# Patient Record
Sex: MALE | Race: BLACK OR AFRICAN AMERICAN | Employment: FULL TIME | URBAN - METROPOLITAN AREA
[De-identification: names, ages, dates, MRNs, and addresses within clinical notes are randomized per-mention and may not be internally consistent; named-entity substitution may affect disease eponyms.]

---

## 2017-07-31 ENCOUNTER — HOSPITAL ENCOUNTER (EMERGENCY)
Age: 57
Discharge: HOME | End: 2017-07-31
Attending: EMERGENCY MEDICINE | Admitting: EMERGENCY MEDICINE
Payer: OTHER GOVERNMENT

## 2017-07-31 VITALS
RESPIRATION RATE: 20 BRPM | HEART RATE: 51 BPM | WEIGHT: 298 LBS | BODY MASS INDEX: 39.49 KG/M2 | OXYGEN SATURATION: 93 % | DIASTOLIC BLOOD PRESSURE: 79 MMHG | SYSTOLIC BLOOD PRESSURE: 112 MMHG | TEMPERATURE: 98.3 F | HEIGHT: 73 IN

## 2017-07-31 DIAGNOSIS — M10.9 ACUTE GOUTY ARTHRITIS: Primary | ICD-10-CM

## 2017-07-31 LAB
ALBUMIN SERPL BCP-MCNC: 3.8 G/DL (ref 3.5–5)
ALBUMIN/GLOB SERPL: 1 {RATIO} (ref 1.1–2.2)
ALP SERPL-CCNC: 53 U/L (ref 45–117)
ALT SERPL-CCNC: 32 U/L (ref 12–78)
ANION GAP BLD CALC-SCNC: 7 MMOL/L (ref 5–15)
AST SERPL W P-5'-P-CCNC: 14 U/L (ref 15–37)
ATRIAL RATE: 58 BPM
BASOPHILS # BLD AUTO: 0 K/UL (ref 0–0.1)
BASOPHILS # BLD: 0 % (ref 0–1)
BILIRUB SERPL-MCNC: 1.5 MG/DL (ref 0.2–1)
BUN SERPL-MCNC: 18 MG/DL (ref 6–20)
BUN/CREAT SERPL: 14 (ref 12–20)
CALCIUM SERPL-MCNC: 9.2 MG/DL (ref 8.5–10.1)
CALCULATED P AXIS, ECG09: -2 DEGREES
CALCULATED R AXIS, ECG10: -20 DEGREES
CALCULATED T AXIS, ECG11: -49 DEGREES
CHLORIDE SERPL-SCNC: 104 MMOL/L (ref 97–108)
CK MB CFR SERPL CALC: 1 % (ref 0–2.5)
CK MB SERPL-MCNC: 5.8 NG/ML (ref 5–25)
CK SERPL-CCNC: 560 U/L (ref 39–308)
CO2 SERPL-SCNC: 26 MMOL/L (ref 21–32)
CREAT SERPL-MCNC: 1.3 MG/DL (ref 0.7–1.3)
DIAGNOSIS, 93000: NORMAL
EOSINOPHIL # BLD: 0.1 K/UL (ref 0–0.4)
EOSINOPHIL NFR BLD: 1 % (ref 0–7)
ERYTHROCYTE [DISTWIDTH] IN BLOOD BY AUTOMATED COUNT: 16.6 % (ref 11.5–14.5)
ERYTHROCYTE [SEDIMENTATION RATE] IN BLOOD: 2 MM/HR (ref 0–20)
GLOBULIN SER CALC-MCNC: 3.9 G/DL (ref 2–4)
GLUCOSE SERPL-MCNC: 115 MG/DL (ref 65–100)
HCT VFR BLD AUTO: 41.2 % (ref 36.6–50.3)
HGB BLD-MCNC: 13.1 G/DL (ref 12.1–17)
LYMPHOCYTES # BLD AUTO: 28 % (ref 12–49)
LYMPHOCYTES # BLD: 1.8 K/UL (ref 0.8–3.5)
MCH RBC QN AUTO: 23.2 PG (ref 26–34)
MCHC RBC AUTO-ENTMCNC: 31.8 G/DL (ref 30–36.5)
MCV RBC AUTO: 72.9 FL (ref 80–99)
MONOCYTES # BLD: 0.5 K/UL (ref 0–1)
MONOCYTES NFR BLD AUTO: 8 % (ref 5–13)
NEUTS SEG # BLD: 4.1 K/UL (ref 1.8–8)
NEUTS SEG NFR BLD AUTO: 63 % (ref 32–75)
P-R INTERVAL, ECG05: 182 MS
PLATELET # BLD AUTO: 273 K/UL (ref 150–400)
POTASSIUM SERPL-SCNC: 3.8 MMOL/L (ref 3.5–5.1)
PROT SERPL-MCNC: 7.7 G/DL (ref 6.4–8.2)
Q-T INTERVAL, ECG07: 426 MS
QRS DURATION, ECG06: 96 MS
QTC CALCULATION (BEZET), ECG08: 418 MS
RBC # BLD AUTO: 5.65 M/UL (ref 4.1–5.7)
SODIUM SERPL-SCNC: 137 MMOL/L (ref 136–145)
TROPONIN I SERPL-MCNC: <0.04 NG/ML
URATE SERPL-MCNC: 9.5 MG/DL (ref 3.5–7.2)
VENTRICULAR RATE, ECG03: 58 BPM
WBC # BLD AUTO: 6.5 K/UL (ref 4.1–11.1)

## 2017-07-31 PROCEDURE — 84550 ASSAY OF BLOOD/URIC ACID: CPT | Performed by: EMERGENCY MEDICINE

## 2017-07-31 PROCEDURE — 84484 ASSAY OF TROPONIN QUANT: CPT | Performed by: EMERGENCY MEDICINE

## 2017-07-31 PROCEDURE — L3809 WHFO W/O JOINTS PRE OTS: HCPCS

## 2017-07-31 PROCEDURE — 93005 ELECTROCARDIOGRAM TRACING: CPT

## 2017-07-31 PROCEDURE — 74011250637 HC RX REV CODE- 250/637: Performed by: EMERGENCY MEDICINE

## 2017-07-31 PROCEDURE — 99285 EMERGENCY DEPT VISIT HI MDM: CPT

## 2017-07-31 PROCEDURE — 80053 COMPREHEN METABOLIC PANEL: CPT | Performed by: EMERGENCY MEDICINE

## 2017-07-31 PROCEDURE — 85652 RBC SED RATE AUTOMATED: CPT | Performed by: EMERGENCY MEDICINE

## 2017-07-31 PROCEDURE — 96374 THER/PROPH/DIAG INJ IV PUSH: CPT

## 2017-07-31 PROCEDURE — 82553 CREATINE MB FRACTION: CPT | Performed by: EMERGENCY MEDICINE

## 2017-07-31 PROCEDURE — 82550 ASSAY OF CK (CPK): CPT | Performed by: EMERGENCY MEDICINE

## 2017-07-31 PROCEDURE — 85025 COMPLETE CBC W/AUTO DIFF WBC: CPT | Performed by: EMERGENCY MEDICINE

## 2017-07-31 PROCEDURE — 74011250636 HC RX REV CODE- 250/636: Performed by: EMERGENCY MEDICINE

## 2017-07-31 PROCEDURE — 36415 COLL VENOUS BLD VENIPUNCTURE: CPT | Performed by: EMERGENCY MEDICINE

## 2017-07-31 RX ORDER — NAPROXEN 500 MG/1
500 TABLET ORAL 2 TIMES DAILY WITH MEALS
Qty: 20 TAB | Refills: 0 | Status: SHIPPED | OUTPATIENT
Start: 2017-07-31 | End: 2017-08-10

## 2017-07-31 RX ORDER — KETOROLAC TROMETHAMINE 30 MG/ML
15 INJECTION, SOLUTION INTRAMUSCULAR; INTRAVENOUS
Status: COMPLETED | OUTPATIENT
Start: 2017-07-31 | End: 2017-07-31

## 2017-07-31 RX ORDER — OXYCODONE AND ACETAMINOPHEN 5; 325 MG/1; MG/1
1 TABLET ORAL
Status: COMPLETED | OUTPATIENT
Start: 2017-07-31 | End: 2017-07-31

## 2017-07-31 RX ORDER — OXYCODONE AND ACETAMINOPHEN 5; 325 MG/1; MG/1
1 TABLET ORAL
Qty: 10 TAB | Refills: 0 | Status: SHIPPED | OUTPATIENT
Start: 2017-07-31 | End: 2017-10-18

## 2017-07-31 RX ADMIN — KETOROLAC TROMETHAMINE 15 MG: 30 INJECTION, SOLUTION INTRAMUSCULAR at 10:32

## 2017-07-31 RX ADMIN — OXYCODONE HYDROCHLORIDE AND ACETAMINOPHEN 1 TABLET: 5; 325 TABLET ORAL at 09:53

## 2017-07-31 NOTE — DISCHARGE INSTRUCTIONS

## 2017-07-31 NOTE — LETTER
[address] 
[address] [facility] [id number] 
[phone] Work/School Note Date: 7/31/2017 To Whom It May concern: 
 
Rc Siddiqui. was seen and treated today in the emergency room by the following provider(s): 
Attending Provider: Katty Huerta MD.   
 
Rc Siddiqui. return to work 8/3/17 Sincerely, Katty Huerta MD

## 2017-07-31 NOTE — ED PROVIDER NOTES
HPI Comments: 62 y.o. male with past medical history significant for HTN, cervical disc herniation, and stenosis of spine who presents from home via private vehicle with chief complaint of let wrist pain. Pt reports that he has been having pain and \"cramping\" in both wrists for the last week, but currently states that he only has pain in his left wrist which became more severe last night. Pain radiates up left arm into biceps. Pt has had gout in his knee in the past and reports compliance with diet reccomendations. 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 Pt denies any other joint pain - including hip, knee, or ankle - and also denies CP, SOB, fever, and chills. There are no other acute medical concerns at this time. Social hx: Never smoker. No alcohol use. PCP: Lennox Dies, MD    Note written by Britt Hannon, as dictated by Silvina Jarvis MD 9:41 AM      The history is provided by the patient. No  was used. Past Medical History:   Diagnosis Date    Cervical disc herniation 2014    C6-7    Hypertension     Stenosis of cervical spine        Past Surgical History:   Procedure Laterality Date    HX ORTHOPAEDIC      C3-4 surgery         History reviewed. No pertinent family history. Social History     Social History    Marital status:      Spouse name: N/A    Number of children: N/A    Years of education: N/A     Occupational History    Not on file. Social History Main Topics    Smoking status: Never Smoker    Smokeless tobacco: Never Used    Alcohol use No    Drug use: Not on file    Sexual activity: Not on file     Other Topics Concern    Not on file     Social History Narrative         ALLERGIES: Review of patient's allergies indicates no known allergies. Review of Systems   Constitutional: Negative for activity change, appetite change and fatigue.    HENT: Negative for ear pain, facial swelling, sore throat and trouble swallowing. Eyes: Negative for pain, discharge and visual disturbance. Respiratory: Negative for chest tightness, shortness of breath and wheezing. Cardiovascular: Negative for chest pain and palpitations. Gastrointestinal: Negative for abdominal pain, blood in stool, nausea and vomiting. Genitourinary: Negative for difficulty urinating, flank pain and hematuria. Musculoskeletal: Negative for arthralgias, joint swelling, myalgias and neck pain. +Left wrist pain    Skin: Negative for color change and rash. Neurological: Negative for dizziness, weakness, numbness and headaches. Hematological: Negative for adenopathy. Does not bruise/bleed easily. Psychiatric/Behavioral: Negative for behavioral problems, confusion and sleep disturbance. All other systems reviewed and are negative. Vitals:    07/31/17 0848   BP: 140/84   Pulse: 66   Resp: 16   Temp: 98.1 °F (36.7 °C)   SpO2: 96%   Weight: 135.2 kg (298 lb)   Height: 6' 1\" (1.854 m)            Physical Exam   Constitutional: He is oriented to person, place, and time. He appears well-developed and well-nourished. No distress. HENT:   Head: Normocephalic and atraumatic. Nose: Nose normal.   Mouth/Throat: Oropharynx is clear and moist.   Eyes: Conjunctivae and EOM are normal. Pupils are equal, round, and reactive to light. No scleral icterus. Neck: Normal range of motion. Neck supple. No JVD present. No tracheal deviation present. No thyromegaly present. No carotid bruits noted. Cardiovascular: Normal rate, regular rhythm, normal heart sounds and intact distal pulses. Exam reveals no gallop and no friction rub. No murmur heard. Pulmonary/Chest: Effort normal and breath sounds normal. No respiratory distress. He has no wheezes. He has no rales. He exhibits no tenderness. Abdominal: Soft. Bowel sounds are normal. He exhibits no distension and no mass. There is no tenderness.  There is no rebound and no guarding. Musculoskeletal: Normal range of motion. Tender over the left first metacarpal and extending down his left forearm. Tenderness and swelling over dorsum of left wrist. No erythema or warmth   Lymphadenopathy:     He has no cervical adenopathy. No significant palpable adenopathy in arm. No axillary adenopathy. Neurological: He is alert and oriented to person, place, and time. He has normal reflexes. No cranial nerve deficit. Coordination normal.   Skin: Skin is warm and dry. No rash noted. No erythema. Psychiatric: He has a normal mood and affect. His behavior is normal. Judgment and thought content normal.   Nursing note and vitals reviewed. Note written by Britt Galeano, as dictated by Rufino Valenzuela MD 9:43 AM      MDM  Number of Diagnoses or Management Options  Acute gouty arthritis: new and requires workup     Amount and/or Complexity of Data Reviewed  Clinical lab tests: ordered and reviewed  Decide to obtain previous medical records or to obtain history from someone other than the patient: yes  Review and summarize past medical records: yes    Risk of Complications, Morbidity, and/or Mortality  Presenting problems: moderate  Diagnostic procedures: moderate  Management options: moderate    Patient Progress  Patient progress: stable    ED Course       Procedures        ED EKG interpretation:  Rhythm: normal sinus rhythm; and regular . Rate (approx.): 58; Axis: left axis deviation; ST/T wave: T wave inversions at lateral leads V4 - V6. Note written by Britt Galeano, as dictated by Rufino Valenzuela MD 9:10 AM    PROGRESS NOTE:  10:43 AM  Uric Acid of 9.5. Assessment and plan: Will call gout. Will d/c home with Naprosyn and Percocet and place pt's wrist in a splint. Will instruct to not work for two days and to f/u with own PCP in 3 - 4 days if not improved.

## 2017-07-31 NOTE — ED TRIAGE NOTES
Pt presents with left wrist pain that began 1 week ago. Pt states the pain got worse last night and he was unable to sleep. Pt describes the pain as intermittent with increase in pain upon movement. Pain is radiating up arm into left bicep. Pt denies chest pain or SOB.

## 2017-10-18 ENCOUNTER — APPOINTMENT (OUTPATIENT)
Dept: GENERAL RADIOLOGY | Age: 57
End: 2017-10-18
Attending: EMERGENCY MEDICINE
Payer: OTHER GOVERNMENT

## 2017-10-18 ENCOUNTER — APPOINTMENT (OUTPATIENT)
Dept: CT IMAGING | Age: 57
End: 2017-10-18
Attending: EMERGENCY MEDICINE
Payer: OTHER GOVERNMENT

## 2017-10-18 ENCOUNTER — HOSPITAL ENCOUNTER (EMERGENCY)
Age: 57
Discharge: HOME | End: 2017-10-18
Attending: EMERGENCY MEDICINE
Payer: OTHER GOVERNMENT

## 2017-10-18 VITALS
HEIGHT: 73 IN | HEART RATE: 61 BPM | TEMPERATURE: 99.5 F | OXYGEN SATURATION: 90 % | SYSTOLIC BLOOD PRESSURE: 160 MMHG | DIASTOLIC BLOOD PRESSURE: 91 MMHG | RESPIRATION RATE: 20 BRPM

## 2017-10-18 DIAGNOSIS — M51.36 DEGENERATIVE DISC DISEASE, LUMBAR: ICD-10-CM

## 2017-10-18 DIAGNOSIS — E86.0 DEHYDRATION: ICD-10-CM

## 2017-10-18 DIAGNOSIS — M54.40 ACUTE MIDLINE LOW BACK PAIN WITH SCIATICA, SCIATICA LATERALITY UNSPECIFIED: Primary | ICD-10-CM

## 2017-10-18 LAB
ALBUMIN SERPL-MCNC: 3.4 G/DL (ref 3.5–5)
ALBUMIN/GLOB SERPL: 0.7 {RATIO} (ref 1.1–2.2)
ALP SERPL-CCNC: 62 U/L (ref 45–117)
ALT SERPL-CCNC: 33 U/L (ref 12–78)
AMORPH CRY URNS QL MICRO: ABNORMAL
ANION GAP SERPL CALC-SCNC: 11 MMOL/L (ref 5–15)
APPEARANCE UR: ABNORMAL
AST SERPL-CCNC: 18 U/L (ref 15–37)
BACTERIA URNS QL MICRO: NEGATIVE /HPF
BASOPHILS # BLD: 0 K/UL (ref 0–0.1)
BASOPHILS NFR BLD: 0 % (ref 0–1)
BILIRUB SERPL-MCNC: 1.9 MG/DL (ref 0.2–1)
BILIRUB UR QL CFM: NEGATIVE
BUN SERPL-MCNC: 37 MG/DL (ref 6–20)
BUN/CREAT SERPL: 19 (ref 12–20)
CALCIUM SERPL-MCNC: 9.7 MG/DL (ref 8.5–10.1)
CHLORIDE SERPL-SCNC: 100 MMOL/L (ref 97–108)
CO2 SERPL-SCNC: 22 MMOL/L (ref 21–32)
COLOR UR: ABNORMAL
CREAT SERPL-MCNC: 1.97 MG/DL (ref 0.7–1.3)
EOSINOPHIL # BLD: 0 K/UL (ref 0–0.4)
EOSINOPHIL NFR BLD: 0 % (ref 0–7)
EPITH CASTS URNS QL MICRO: ABNORMAL /LPF
ERYTHROCYTE [DISTWIDTH] IN BLOOD BY AUTOMATED COUNT: 15.4 % (ref 11.5–14.5)
GLOBULIN SER CALC-MCNC: 5 G/DL (ref 2–4)
GLUCOSE SERPL-MCNC: 112 MG/DL (ref 65–100)
GLUCOSE UR STRIP.AUTO-MCNC: NEGATIVE MG/DL
GRAN CASTS URNS QL MICRO: ABNORMAL /LPF
HCT VFR BLD AUTO: 44.6 % (ref 36.6–50.3)
HGB BLD-MCNC: 14.4 G/DL (ref 12.1–17)
HGB UR QL STRIP: NEGATIVE
HYALINE CASTS URNS QL MICRO: ABNORMAL /LPF (ref 0–5)
KETONES UR QL STRIP.AUTO: NEGATIVE MG/DL
LEUKOCYTE ESTERASE UR QL STRIP.AUTO: NEGATIVE
LYMPHOCYTES # BLD: 1.1 K/UL (ref 0.8–3.5)
LYMPHOCYTES NFR BLD: 10 % (ref 12–49)
MCH RBC QN AUTO: 23.6 PG (ref 26–34)
MCHC RBC AUTO-ENTMCNC: 32.3 G/DL (ref 30–36.5)
MCV RBC AUTO: 73.2 FL (ref 80–99)
MONOCYTES # BLD: 1.2 K/UL (ref 0–1)
MONOCYTES NFR BLD: 11 % (ref 5–13)
NEUTS SEG # BLD: 8.5 K/UL (ref 1.8–8)
NEUTS SEG NFR BLD: 79 % (ref 32–75)
NITRITE UR QL STRIP.AUTO: NEGATIVE
PH UR STRIP: 5 [PH] (ref 5–8)
PLATELET # BLD AUTO: 354 K/UL (ref 150–400)
POTASSIUM SERPL-SCNC: 3.9 MMOL/L (ref 3.5–5.1)
PROT SERPL-MCNC: 8.4 G/DL (ref 6.4–8.2)
PROT UR STRIP-MCNC: NEGATIVE MG/DL
RBC # BLD AUTO: 6.09 M/UL (ref 4.1–5.7)
RBC #/AREA URNS HPF: ABNORMAL /HPF (ref 0–5)
SODIUM SERPL-SCNC: 133 MMOL/L (ref 136–145)
SP GR UR REFRACTOMETRY: 1.02 (ref 1–1.03)
UR CULT HOLD, URHOLD: NORMAL
UROBILINOGEN UR QL STRIP.AUTO: 1 EU/DL (ref 0.2–1)
WBC # BLD AUTO: 10.7 K/UL (ref 4.1–11.1)
WBC CASTS URNS QL MICRO: ABNORMAL /LPF
WBC URNS QL MICRO: ABNORMAL /HPF (ref 0–4)
YEAST URNS QL MICRO: PRESENT

## 2017-10-18 PROCEDURE — 85025 COMPLETE CBC W/AUTO DIFF WBC: CPT | Performed by: EMERGENCY MEDICINE

## 2017-10-18 PROCEDURE — 80053 COMPREHEN METABOLIC PANEL: CPT | Performed by: EMERGENCY MEDICINE

## 2017-10-18 PROCEDURE — 74011636637 HC RX REV CODE- 636/637: Performed by: EMERGENCY MEDICINE

## 2017-10-18 PROCEDURE — 74011250637 HC RX REV CODE- 250/637: Performed by: EMERGENCY MEDICINE

## 2017-10-18 PROCEDURE — 96374 THER/PROPH/DIAG INJ IV PUSH: CPT

## 2017-10-18 PROCEDURE — 81001 URINALYSIS AUTO W/SCOPE: CPT | Performed by: EMERGENCY MEDICINE

## 2017-10-18 PROCEDURE — 74011250636 HC RX REV CODE- 250/636: Performed by: EMERGENCY MEDICINE

## 2017-10-18 PROCEDURE — 72131 CT LUMBAR SPINE W/O DYE: CPT

## 2017-10-18 PROCEDURE — 96375 TX/PRO/DX INJ NEW DRUG ADDON: CPT

## 2017-10-18 PROCEDURE — 74020 XR ABD FLAT/ ERECT: CPT

## 2017-10-18 PROCEDURE — 96376 TX/PRO/DX INJ SAME DRUG ADON: CPT

## 2017-10-18 PROCEDURE — 99284 EMERGENCY DEPT VISIT MOD MDM: CPT

## 2017-10-18 PROCEDURE — 96361 HYDRATE IV INFUSION ADD-ON: CPT

## 2017-10-18 RX ORDER — METHOCARBAMOL 750 MG/1
750 TABLET, FILM COATED ORAL 3 TIMES DAILY
Qty: 20 TAB | Refills: 0 | Status: SHIPPED | OUTPATIENT
Start: 2017-10-18 | End: 2018-04-26

## 2017-10-18 RX ORDER — HYDROMORPHONE HYDROCHLORIDE 1 MG/ML
1 INJECTION, SOLUTION INTRAMUSCULAR; INTRAVENOUS; SUBCUTANEOUS
Status: COMPLETED | OUTPATIENT
Start: 2017-10-18 | End: 2017-10-18

## 2017-10-18 RX ORDER — ADHESIVE BANDAGE
30 BANDAGE TOPICAL
Status: COMPLETED | OUTPATIENT
Start: 2017-10-18 | End: 2017-10-18

## 2017-10-18 RX ORDER — HYDROCODONE BITARTRATE AND ACETAMINOPHEN 7.5; 325 MG/1; MG/1
1 TABLET ORAL
Qty: 20 TAB | Refills: 0 | Status: SHIPPED | OUTPATIENT
Start: 2017-10-18 | End: 2018-04-26

## 2017-10-18 RX ORDER — METHOCARBAMOL 750 MG/1
750 TABLET, FILM COATED ORAL
Status: COMPLETED | OUTPATIENT
Start: 2017-10-18 | End: 2017-10-18

## 2017-10-18 RX ORDER — ONDANSETRON 2 MG/ML
4 INJECTION INTRAMUSCULAR; INTRAVENOUS
Status: COMPLETED | OUTPATIENT
Start: 2017-10-18 | End: 2017-10-18

## 2017-10-18 RX ORDER — PREDNISONE 20 MG/1
40 TABLET ORAL DAILY
Qty: 20 TAB | Refills: 0 | Status: SHIPPED | OUTPATIENT
Start: 2017-10-18 | End: 2017-10-22

## 2017-10-18 RX ORDER — KETOROLAC TROMETHAMINE 30 MG/ML
30 INJECTION, SOLUTION INTRAMUSCULAR; INTRAVENOUS
Status: COMPLETED | OUTPATIENT
Start: 2017-10-18 | End: 2017-10-18

## 2017-10-18 RX ORDER — PREDNISONE 20 MG/1
60 TABLET ORAL
Status: COMPLETED | OUTPATIENT
Start: 2017-10-18 | End: 2017-10-18

## 2017-10-18 RX ADMIN — METHOCARBAMOL 750 MG: 750 TABLET ORAL at 16:05

## 2017-10-18 RX ADMIN — KETOROLAC TROMETHAMINE 30 MG: 30 INJECTION, SOLUTION INTRAMUSCULAR at 14:28

## 2017-10-18 RX ADMIN — HYDROMORPHONE HYDROCHLORIDE 1 MG: 1 INJECTION, SOLUTION INTRAMUSCULAR; INTRAVENOUS; SUBCUTANEOUS at 17:47

## 2017-10-18 RX ADMIN — PREDNISONE 60 MG: 20 TABLET ORAL at 14:23

## 2017-10-18 RX ADMIN — SODIUM CHLORIDE 1000 ML: 900 INJECTION, SOLUTION INTRAVENOUS at 17:47

## 2017-10-18 RX ADMIN — HYDROMORPHONE HYDROCHLORIDE 1 MG: 1 INJECTION, SOLUTION INTRAMUSCULAR; INTRAVENOUS; SUBCUTANEOUS at 14:29

## 2017-10-18 RX ADMIN — ONDANSETRON 4 MG: 2 INJECTION INTRAMUSCULAR; INTRAVENOUS at 14:26

## 2017-10-18 RX ADMIN — MAGNESIUM HYDROXIDE 30 ML: 400 SUSPENSION ORAL at 16:06

## 2017-10-18 NOTE — ED TRIAGE NOTES
Lower back pain since Friday, denies injury, pt with hx of back problems, denies numbness or tingling, pain radiates to left leg from calf down, pt is also constipated, last bm Saturday

## 2017-10-18 NOTE — ED NOTES
Reviewed discharge paperwork with patient. Patient verbalized an understanding of discharge paperwork and has no further questions at the time of discharge.

## 2017-10-18 NOTE — ED PROVIDER NOTES
Patient is a 62 y.o. male presenting with back pain. Back Pain    Pertinent negatives include no headaches, no abdominal pain and no dysuria. Pt states that he suffers from chronic neck and back pain. For the past week the pain has increased on his lower right side of his back with radiculopathy to the right leg. Denies any blunt trauma, falls or new injury. Denies fever, rash, abdominal pain or urinary symptoms. Denies any saddle anesthesia or changes in bowel or bladder. He does report that he has \"issues with constipation\" but has not taken his metamucil because walking to the bathroom is painful; last BM about 3 days ago. Pain increases with walking, bending and position changes. Gait is slow but steady with assistance. He has not had any pain medications today prior to arrival. He was taking motrin and a \"muscle relaxant\" yesterday evening without relief. Past Medical History:   Diagnosis Date    Cervical disc herniation 2014    C6-7    Hypertension     Stenosis of cervical spine        Past Surgical History:   Procedure Laterality Date    HX ORTHOPAEDIC      C3-4 surgery         History reviewed. No pertinent family history. Social History     Social History    Marital status:      Spouse name: N/A    Number of children: N/A    Years of education: N/A     Occupational History    Not on file. Social History Main Topics    Smoking status: Never Smoker    Smokeless tobacco: Never Used    Alcohol use No    Drug use: Not on file    Sexual activity: Not on file     Other Topics Concern    Not on file     Social History Narrative         ALLERGIES: Review of patient's allergies indicates no known allergies. Review of Systems   Constitutional: Negative for activity change and appetite change. HENT: Negative for facial swelling, sore throat and trouble swallowing. Eyes: Negative. Respiratory: Negative for shortness of breath. Cardiovascular: Negative. Gastrointestinal: Negative for abdominal pain, diarrhea and vomiting. Genitourinary: Negative for dysuria. Musculoskeletal: Positive for back pain. Negative for neck pain. Skin: Negative for color change. Neurological: Negative for headaches. Psychiatric/Behavioral: Negative. Vitals:    10/18/17 1128   BP: 116/79   Pulse: 61   Resp: 20   Temp: 99.5 °F (37.5 °C)   SpO2: 96%   Height: 6' 1\" (1.854 m)            Physical Exam   Constitutional: He is oriented to person, place, and time. He appears well-nourished. Morbidly obese black male; ; retired ; non smoker   HENT:   Head: Normocephalic. Mouth/Throat: Oropharynx is clear and moist.   Eyes: Pupils are equal, round, and reactive to light. Neck: Normal range of motion. Neck supple. Cardiovascular: Normal rate and regular rhythm. Pulmonary/Chest: Effort normal and breath sounds normal.   Abdominal: Soft. Bowel sounds are normal. He exhibits no distension and no mass. There is no tenderness. There is no rebound and no guarding. Musculoskeletal: Normal range of motion. He exhibits tenderness. He exhibits no deformity. Reports severe right sided back pain; Skin integrity is intact. There is no obvious deformity, rash, bruising or erythema. Good neurovascular sensation. Neurological: He is alert and oriented to person, place, and time. Skin: Skin is warm and dry. Rash noted. Severe foot callouses with deep cracks   Nursing note and vitals reviewed. MDM  ED Course       Procedures      Pt has been re-examined and is feeling better. '  Discussed the need for a regular bowel regime, especially while taking pain medications. Recommend close ortho follow up.  5:51 PM  Patient's results and plan of care have been reviewed with him and his wife.   Patient and/or family have verbally conveyed their understanding and agreement of the patient's signs, symptoms, diagnosis, treatment and prognosis and additionally agree to follow up as recommended or return to the Emergency Room should his condition change prior to follow-up. Discharge instructions have also been provided to the patient with some educational information regarding his diagnosis as well a list of reasons why he would want to return to the ER prior to his follow-up appointment should his condition change. Elizabeth Siegel NP

## 2017-10-18 NOTE — LETTER
NOTIFICATION RETURN TO WORK / SCHOOL 
 
10/18/2017 5:53 PM 
 
Mr. Blas Haq. 
[address] [place] [id number] To Whom It May Concern: 
 
Blas Haq. is currently under the care of [facility]. He will return to work/school on: 10/23/17 If there are questions or concerns please have the patient contact our office. Sincerely, Lesley Severe, NP

## 2017-10-18 NOTE — DISCHARGE INSTRUCTIONS
Back Pain, Emergency or Urgent Symptoms: Care Instructions  Your Care Instructions  Many people have back pain at one time or another. In most cases, pain gets better with self-care that includes over-the-counter pain medicine, ice, heat, and exercises. Unless you have symptoms of a severe injury or heart attack, you may be able to give yourself a few days before you call a doctor. But some back problems are very serious. Do not ignore symptoms that need to be checked right away. Follow-up care is a key part of your treatment and safety. Be sure to make and go to all appointments, and call your doctor if you are having problems. It's also a good idea to know your test results and keep a list of the medicines you take. How can you care for yourself at home? · Sit or lie in positions that are most comfortable and that reduce your pain. Try one of these positions when you lie down:  ¨ Lie on your back with your knees bent and supported by large pillows. ¨ Lie on the floor with your legs on the seat of a sofa or chair. Duayne Lark on your side with your knees and hips bent and a pillow between your legs. ¨ Lie on your stomach if it does not make pain worse. · Do not sit up in bed, and avoid soft couches and twisted positions. Bed rest can help relieve pain at first, but it delays healing. Avoid bed rest after the first day. · Change positions every 30 minutes. If you must sit for long periods of time, take breaks from sitting. Get up and walk around, or lie flat. · Try using a heating pad on a low or medium setting, for 15 to 20 minutes every 2 or 3 hours. Try a warm shower in place of one session with the heating pad. You can also buy single-use heat wraps that last up to 8 hours. You can also try ice or cold packs on your back for 10 to 20 minutes at a time, several times a day. (Put a thin cloth between the ice pack and your skin.) This reduces pain and makes it easier to be active and exercise.   · Take pain medicines exactly as directed. ¨ If the doctor gave you a prescription medicine for pain, take it as prescribed. ¨ If you are not taking a prescription pain medicine, ask your doctor if you can take an over-the-counter medicine. When should you call for help? Call 911 anytime you think you may need emergency care. For example, call if:  · You are unable to move a leg at all. · You have back pain with severe belly pain. · You have symptoms of a heart attack. These may include:  ¨ Chest pain or pressure, or a strange feeling in the chest.  ¨ Sweating. ¨ Shortness of breath. ¨ Nausea or vomiting. ¨ Pain, pressure, or a strange feeling in the back, neck, jaw, or upper belly or in one or both shoulders or arms. ¨ Lightheadedness or sudden weakness. ¨ A fast or irregular heartbeat. After you call 911, the  may tell you to chew 1 adult-strength or 2 to 4 low-dose aspirin. Wait for an ambulance. Do not try to drive yourself. Call your doctor now or seek immediate medical care if:  · You have new or worse symptoms in your arms, legs, chest, belly, or buttocks. Symptoms may include:  ¨ Numbness or tingling. ¨ Weakness. ¨ Pain. · You lose bladder or bowel control. · You have back pain and:  ¨ You have injured your back while lifting or doing some other activity. Call if the pain is severe, has not gone away after 1 or 2 days, and you cannot do your normal daily activities. ¨ You have had a back injury before that needed treatment. ¨ Your pain has lasted longer than 4 weeks. ¨ You have had weight loss you cannot explain. ¨ You are age 48 or older. ¨ You have cancer now or have had it before. Watch closely for changes in your health, and be sure to contact your doctor if you are not getting better as expected. Where can you learn more? Go to http://noa-shawn.info/.   Enter F727 in the search box to learn more about \"Back Pain, Emergency or Urgent Symptoms: Care Instructions. \"  Current as of: March 20, 2017  Content Version: 11.3  © 7618-9248 [employer] Care instructions adapted under license by [employer] (which disclaims liability or warranty for this information). If you have questions about a medical condition or this instruction, always ask your healthcare professional. [address] any warranty or liability for your use of this information. Dehydration: Care Instructions  Your Care Instructions  Dehydration happens when your body loses too much fluid. This might happen when you do not drink enough water or you lose large amounts of fluids from your body because of diarrhea, vomiting, or sweating. Severe dehydration can be life-threatening. Water and minerals called electrolytes help put your body fluids back in balance. Learn the early signs of fluid loss, and drink more fluids to prevent dehydration. Follow-up care is a key part of your treatment and safety. Be sure to make and go to all appointments, and call your doctor if you are having problems. It's also a good idea to know your test results and keep a list of the medicines you take. How can you care for yourself at home? · To prevent dehydration, drink plenty of fluids, enough so that your urine is light yellow or clear like water. Choose water and other caffeine-free clear liquids until you feel better. If you have kidney, heart, or liver disease and have to limit fluids, talk with your doctor before you increase the amount of fluids you drink. · If you do not feel like eating or drinking, try taking small sips of water, sports drinks, or other rehydration drinks. · Get plenty of rest.  To prevent dehydration  · Add more fluids to your diet and daily routine, unless your doctor has told you not to. · During hot weather, drink more fluids. Drink even more fluids if you exercise a lot. Stay away from drinks with alcohol or caffeine.   · Watch for the symptoms of dehydration. These include:  ¨ A dry, sticky mouth. ¨ Dark yellow urine, and not much of it. ¨ Dry and sunken eyes. ¨ Feeling very tired. · Learn what problems can lead to dehydration. These include:  ¨ Diarrhea, fever, and vomiting. ¨ Any illness with a fever, such as pneumonia or the flu. ¨ Activities that cause heavy sweating, such as endurance races and heavy outdoor work in hot or humid weather. ¨ Alcohol or drug abuse or withdrawal.  ¨ Certain medicines, such as cold and allergy pills (antihistamines), diet pills (diuretics), and laxatives. ¨ Certain diseases, such as diabetes, cancer, and heart or kidney disease. When should you call for help? Call 911 anytime you think you may need emergency care. For example, call if:  · You passed out (lost consciousness). Call your doctor now or seek immediate medical care if:  · You are confused and cannot think clearly. · You are dizzy or lightheaded, or you feel like you may faint. · You have signs of needing more fluids. You have sunken eyes and a dry mouth, and you pass only a little dark urine. · You cannot keep fluids down. Watch closely for changes in your health, and be sure to contact your doctor if:  · You are not making tears. · Your skin is very dry and sags slowly back into place after you pinch it. · Your mouth and eyes are very dry. Where can you learn more? Go to http://noa-shawn.info/. Enter H011 in the search box to learn more about \"Dehydration: Care Instructions. \"  Current as of: March 20, 2017  Content Version: 11.3  © 2448-0951 [employer]. Care instructions adapted under license by [employer] (which disclaims liability or warranty for this information). If you have questions about a medical condition or this instruction, always ask your healthcare professional. [place] [address] any warranty or liability for your use of this information.          Learning About Degenerative Disc Disease  What is degenerative disc disease? Degenerative disc disease is not really a disease. It's a term used to describe the normal changes in your spinal discs as you age. Spinal discs are small, spongy discs that separate the bones (vertebrae) that make up the spine. The discs act as shock absorbers for the spine, so it can flex, bend, and twist.  Degenerative disc disease can take place in one or more places along the spine. It most often occurs in the discs in the lower back and the neck. What causes it? As we age, our spinal discs break down, or degenerate. This breakdown causes the symptoms of degenerative disc disease in some people. When the discs break down, they can lose fluid and dry out, and their outer layers can have tiny cracks or tears. This leads to less padding and less space between the bones in the spine. The body reacts to this by making bony growths on the spine called bone spurs. These spurs can press on the spinal nerve roots or spinal cord. This can cause pain and can affect how well the nerves work. What are the symptoms? Many people with degenerative disc disease have no pain. But others have severe pain or other symptoms that limit their activities. Some of the most common symptoms are:  · Pain in the back or neck. Where the pain occurs depends on which discs are affected. · Pain that gets worse when you move, such as bending over, reaching up, or twisting. · Pain that may occur in the rear end (buttocks), arm, or leg if a nerve is pinched. · Numbness or tingling in your arm or leg. How is it diagnosed? A doctor can often diagnose degenerative disc disease while doing a physical exam. If your exam shows no signs of a serious condition, imaging tests (such as an X-ray) aren't likely to help your doctor find the cause of your symptoms.   Sometimes degenerative disc disease is found when an X-ray is taken for another reason, such as an injury or other health problem. But even if the doctor finds degenerative disc disease, that doesn't always mean that you will have symptoms. How is it treated? There are several things you can do at home to manage pain from this problem. · To relieve pain, use ice or heat (whichever feels better) on the affected area. ¨ Put ice or a cold pack on the area for 10 to 20 minutes at a time. Put a thin cloth between the ice and your skin. ¨ Put a warm water bottle, a heating pad set on low, or a warm cloth on your back. Put a thin cloth between the heating pad and your skin. Do not go to sleep with a heating pad on your skin. · Ask your doctor if you can take acetaminophen (such as Tylenol) or nonsteroidal anti-inflammatory drugs, such as ibuprofen or naproxen. Your doctor can prescribe stronger medicines if needed. Be safe with medicines. Read and follow all instructions on the label. · Get some exercise every day. Exercise is one of the best ways to help your back feel better and stay better. It's best to start each exercise slowly. You may notice a little soreness, and that's okay. But if an exercise makes your pain worse, stop doing it. Here are things you can try:  ¨ Walking. It's the simplest and maybe the best activity for your back. It gets your blood moving and helps your muscles stay strong. ¨ Exercises that gently stretch and strengthen your stomach, back, and leg muscles. The stronger those muscles are, the better they're able to protect your back. If you have constant or severe pain in your back or spine, you may need other treatments, such as physical therapy. In some cases, your doctor may suggest surgery. Follow-up care is a key part of your treatment and safety. Be sure to make and go to all appointments, and call your doctor if you are having problems. It's also a good idea to know your test results and keep a list of the medicines you take. Where can you learn more?   Go to http://javier.info/. Enter V472 in the search box to learn more about \"Learning About Degenerative Disc Disease. \"  Current as of: March 21, 2017  Content Version: 11.3  © 2878-9607 [employer]. Care instructions adapted under license by [employer] (which disclaims liability or warranty for this information). If you have questions about a medical condition or this instruction, always ask your healthcare professional. [place] [address] any warranty or liability for your use of this information.

## 2018-04-26 ENCOUNTER — APPOINTMENT (OUTPATIENT)
Dept: GENERAL RADIOLOGY | Age: 58
End: 2018-04-26
Attending: PHYSICIAN ASSISTANT
Payer: OTHER GOVERNMENT

## 2018-04-26 ENCOUNTER — HOSPITAL ENCOUNTER (EMERGENCY)
Age: 58
Discharge: HOME | End: 2018-04-26
Attending: EMERGENCY MEDICINE
Payer: OTHER GOVERNMENT

## 2018-04-26 VITALS
SYSTOLIC BLOOD PRESSURE: 133 MMHG | BODY MASS INDEX: 38.43 KG/M2 | WEIGHT: 290 LBS | OXYGEN SATURATION: 97 % | HEIGHT: 73 IN | TEMPERATURE: 97.9 F | DIASTOLIC BLOOD PRESSURE: 83 MMHG | HEART RATE: 72 BPM | RESPIRATION RATE: 18 BRPM

## 2018-04-26 DIAGNOSIS — M54.9 RIGHT-SIDED BACK PAIN, UNSPECIFIED BACK LOCATION, UNSPECIFIED CHRONICITY: ICD-10-CM

## 2018-04-26 DIAGNOSIS — M62.830 SPASM OF THORACIC BACK MUSCLE: Primary | ICD-10-CM

## 2018-04-26 LAB
APPEARANCE UR: CLEAR
BACTERIA URNS QL MICRO: NEGATIVE /HPF
BILIRUB UR QL: NEGATIVE
COLOR UR: NORMAL
EPITH CASTS URNS QL MICRO: NORMAL /LPF
GLUCOSE UR STRIP.AUTO-MCNC: NEGATIVE MG/DL
HGB UR QL STRIP: NEGATIVE
HYALINE CASTS URNS QL MICRO: NORMAL /LPF (ref 0–5)
KETONES UR QL STRIP.AUTO: NEGATIVE MG/DL
LEUKOCYTE ESTERASE UR QL STRIP.AUTO: NEGATIVE
NITRITE UR QL STRIP.AUTO: NEGATIVE
PH UR STRIP: 6 [PH] (ref 5–8)
PROT UR STRIP-MCNC: NEGATIVE MG/DL
RBC #/AREA URNS HPF: NORMAL /HPF (ref 0–5)
SP GR UR REFRACTOMETRY: 1.02 (ref 1–1.03)
UR CULT HOLD, URHOLD: NORMAL
UROBILINOGEN UR QL STRIP.AUTO: 1 EU/DL (ref 0.2–1)
WBC URNS QL MICRO: NORMAL /HPF (ref 0–4)

## 2018-04-26 PROCEDURE — 81001 URINALYSIS AUTO W/SCOPE: CPT | Performed by: PHYSICIAN ASSISTANT

## 2018-04-26 PROCEDURE — 74011250636 HC RX REV CODE- 250/636: Performed by: PHYSICIAN ASSISTANT

## 2018-04-26 PROCEDURE — 99283 EMERGENCY DEPT VISIT LOW MDM: CPT

## 2018-04-26 PROCEDURE — 96372 THER/PROPH/DIAG INJ SC/IM: CPT

## 2018-04-26 PROCEDURE — 74011250637 HC RX REV CODE- 250/637: Performed by: PHYSICIAN ASSISTANT

## 2018-04-26 PROCEDURE — 72072 X-RAY EXAM THORAC SPINE 3VWS: CPT

## 2018-04-26 RX ORDER — DIAZEPAM 5 MG/1
5 TABLET ORAL
Qty: 15 TAB | Refills: 0 | Status: SHIPPED | OUTPATIENT
Start: 2018-04-26

## 2018-04-26 RX ORDER — OXYCODONE AND ACETAMINOPHEN 5; 325 MG/1; MG/1
1 TABLET ORAL
Qty: 20 TAB | Refills: 0 | Status: SHIPPED | OUTPATIENT
Start: 2018-04-26

## 2018-04-26 RX ORDER — OXYCODONE AND ACETAMINOPHEN 5; 325 MG/1; MG/1
2 TABLET ORAL
Status: COMPLETED | OUTPATIENT
Start: 2018-04-26 | End: 2018-04-26

## 2018-04-26 RX ORDER — DIAZEPAM 10 MG/2ML
5 INJECTION INTRAMUSCULAR
Status: COMPLETED | OUTPATIENT
Start: 2018-04-26 | End: 2018-04-26

## 2018-04-26 RX ADMIN — Medication 5 MG: at 20:46

## 2018-04-26 RX ADMIN — OXYCODONE HYDROCHLORIDE AND ACETAMINOPHEN 2 TABLET: 5; 325 TABLET ORAL at 20:45

## 2018-04-26 NOTE — LETTER
[address] 
[address] [facility] [address] 
[phone] Work/School Note Date: 4/26/2018 To Whom It May concern: 
 
Prince Shin. was seen and treated today in the emergency room by the following provider(s): 
Attending Provider: Chinyere Bonilla DO Physician Assistant: EARLINE Hernandez. Prince Shin may return to work on Monday. Sincerely, EARLINE Hernandez

## 2018-04-26 NOTE — ED TRIAGE NOTES
Triage:  Pt to ED due to concerns over reoccuring mid and lower back spasms. Pt states was seen and treated at [address] [place] [address] last week for the same. Pt states was given time off and meds for pain and spasms. Pt states he felt slightly better, he went to work today and noted a return of the aforementioned symptoms. Pt into triage with wheel chair due to spasms.

## 2018-04-27 NOTE — DISCHARGE INSTRUCTIONS
Back Spasm: Care Instructions  Your Care Instructions  A back spasm is sudden tightness and pain in your back muscles. It may happen from overuse or an injury. Things like sleeping in an awkward way, bending, lifting, standing, or sitting can sometimes cause a spasm. But the cause isn't always clear. Home treatment includes using heat or ice, taking over-the-counter (OTC) pain medicines, and avoiding activities that may cause back pain. For a back spasm that doesn't get better with home care, your doctor may prescribe medicine. Treatments such as massage or manipulation may also help ease a back spasm. Your doctor may also suggest exercise or physical therapy to help improve strength and flexibility in your back muscles. In most cases, getting back to your normal activities is good foryour back. Just make sure to avoid doing things that make your pain worse. Follow-up care is a key part of your treatment and safety. Be sure to make and go to all appointments, and call your doctor if you are having problems. It's also a good idea to know your test results and keep a list of the medicines you take. How can you care for yourself at home? ? Heat, ice, and medicines  ? · To relieve pain, use heat or ice (whichever feels better) on the affected area. ¨ Put a warm water bottle, a heating pad set on low, or a warm cloth on your back. Put a thin cloth between the heating pad and your skin. Do not go to sleep with a heating pad on your skin. ¨ Try ice or a cold pack on the area for 10 to 20 minutes at a time. Put a thin cloth between the ice and your skin. ? · Ask your doctor if you can take acetaminophen (such as Tylenol) or nonsteroidal anti-inflammatory drugs, such as ibuprofen or naproxen. Your doctor can prescribe stronger medicines if needed. Be safe with medicines. Read and follow all instructions on the label. ?Body positions and posture  ?  · Sit or lie in positions that are most comfortable for you and that reduce pain. Try one of these positions when you lie down:  ¨ Lie on your back with your knees bent and supported by large pillows. ¨ Lie on the floor with your legs on the seat of a sofa or chair. [place] on your side with your knees and hips bent and a pillow between your legs. ¨ Lie on your stomach if it does not make pain worse. ? · Do not sit up in bed. Avoid soft couches and twisted positions. ? · Avoid bed rest after the first day of back pain. Bed rest can help relieve pain at first, but it delays healing. Continued rest without activity is usually not good for your back. ? · If you must sit for long periods of time, take breaks from sitting. Change positions every 30 minutes. Get up and walk around, or lie in a comfortable position. Activity  ? · Take short walks several times a day. You can start with 5 to 10 minutes, 3 or 4 times a day, and work up to longer walks. Walk on level surfaces and avoid hills and stairs until your back starts to feel better. ? · After your back spasm starts to feel better, try to stretch your muscles every day, especially before and after exercise and at bedtime. Regular stretching can help relax your muscles. ? · To prevent future back pain, do exercises to stretch and strengthen your back and stomach. Learn to use good posture, safe lifting techniques, and other ways to move to help you avoid back pain. When should you call for help? Call 911 anytime you think you may need emergency care. For example, call if:  ? · You are unable to move an arm or a leg at all. ?Call your doctor now or seek immediate medical care if:  ? · You have new or worse symptoms in your legs, belly, or buttocks. Symptoms may include:  ¨ Numbness or tingling. ¨ Weakness. ¨ Pain. ? · You lose bladder or bowel control. ? Watch closely for changes in your health, and be sure to contact your doctor if:  ? · You do not get better as expected. Where can you learn more?   Go to http://noa-shawn.info/. Enter E232 in the search box to learn more about \"Back Spasm: Care Instructions. \"  Current as of: March 21, 2017  Content Version: 11.4  © 1910-5311 Healthwise, [employer]. Care instructions adapted under license by [employer] (which disclaims liability or warranty for this information). If you have questions about a medical condition or this instruction, always ask your healthcare professional. [place] [address] any warranty or liability for your use of this information.

## 2018-04-27 NOTE — ED PROVIDER NOTES
HPI Comments: 63 yo male with hx of HTN, cervical disc herniation and stenosis of cervical spine here for evaluation of right mid back \"spasms\". States he was experiencing pain last week and seen at [facility] and given Flexeril and Hydrocodone; states \"it does not work\". States he went back to work the past two days and pain has returned. Pain worse with movement and twisting. Has seen PCP in past and states he \"just finished therapy\"; has also been seen by [facility] in the past.   Denies loss of bowel or baldder. No numbness/tingling. Denies cough, CP or SOB. Denies fever, abd pain, urinary symptoms. Non smoker. Patient is a 62 y.o. male presenting with back pain. The history is provided by the patient. Back Pain    This is a recurrent problem. The current episode started more than 2 days ago. The pain is present in the right side. The quality of the pain is described as aching. The pain is at a severity of 8/10. The pain is moderate. The symptoms are aggravated by certain positions and twisting. Pertinent negatives include no chest pain, no fever, no bowel incontinence, no perianal numbness, no leg pain, no paresthesias, no paresis, no tingling and no weakness. He has tried analgesics and muscle relaxants for the symptoms. Past Medical History:   Diagnosis Date    Cervical disc herniation 2014    C6-7    Hypertension     Stenosis of cervical spine        Past Surgical History:   Procedure Laterality Date    HX ORTHOPAEDIC      C3-4 surgery         History reviewed. No pertinent family history. Social History     Social History    Marital status:      Spouse name: N/A    Number of children: N/A    Years of education: N/A     Occupational History    Not on file.      Social History Main Topics    Smoking status: Never Smoker    Smokeless tobacco: Never Used    Alcohol use No    Drug use: Not on file    Sexual activity: Not on file     Other Topics Concern    Not on file Social History Narrative         ALLERGIES: Review of patient's allergies indicates no known allergies. Review of Systems   Constitutional: Negative for activity change and fever. HENT: Negative for facial swelling. Eyes: Negative for discharge. Respiratory: Negative for cough. Cardiovascular: Negative for chest pain and leg swelling. Gastrointestinal: Negative for abdominal distention and bowel incontinence. Musculoskeletal: Positive for back pain and myalgias. Skin: Negative for color change. Neurological: Negative for tingling, seizures, syncope, weakness and paresthesias. Psychiatric/Behavioral: Negative for behavioral problems. Vitals:    04/26/18 1959   BP: 133/83   Pulse: 72   Resp: 18   Temp: 97.9 °F (36.6 °C)   SpO2: 97%   Weight: 131.5 kg (290 lb)   Height: 6' 1\" (1.854 m)            Physical Exam   Constitutional: He is oriented to person, place, and time. He appears well-developed and well-nourished. He appears distressed (moderate). HENT:   Head: Normocephalic and atraumatic. Right Ear: External ear normal.   Left Ear: External ear normal.   Nose: Nose normal.   Mouth/Throat: Oropharynx is clear and moist.   Eyes: Conjunctivae and EOM are normal. Pupils are equal, round, and reactive to light. Right eye exhibits no discharge. Left eye exhibits no discharge. Neck: Normal range of motion. Neck supple. Cardiovascular: Normal rate, regular rhythm, normal heart sounds and intact distal pulses. Pulmonary/Chest: Effort normal and breath sounds normal.   Abdominal: Soft. Bowel sounds are normal. He exhibits no distension. There is no tenderness. There is no rebound and no guarding. Musculoskeletal: Normal range of motion. He exhibits no edema. Cervical back: Normal.        Thoracic back: He exhibits tenderness. He exhibits no bony tenderness. Lumbar back: Normal.        Back:    Neurological: He is alert and oriented to person, place, and time.  No cranial nerve deficit. Coordination normal.   Skin: Skin is warm and dry. No rash noted. Psychiatric: He has a normal mood and affect. His behavior is normal. Judgment and thought content normal.   Nursing note and vitals reviewed. MDM  Number of Diagnoses or Management Options  Right-sided back pain, unspecified back location, unspecified chronicity:   Spasm of thoracic back muscle:      Amount and/or Complexity of Data Reviewed  Tests in the radiology section of CPT®: ordered and reviewed  Independent visualization of images, tracings, or specimens: yes          ED Course       Procedures    Patient has been reassessed. Feeling much better. Reviewed labs, medications and radiographics with patient. Ready to discharge home. Will have PCP and Ortho followup. Patient's results have been reviewed with them. Patient and/or family have verbally conveyed their understanding and agreement of the patient's signs, symptoms, diagnosis, treatment and prognosis and additionally agree to follow up as recommended or return to the Emergency Room should their condition change prior to follow-up. Discharge instructions have also been provided to the patient with some educational information regarding their diagnosis as well a list of reasons why they would want to return to the ER prior to their follow-up appointment should their condition change.   EARLINE Sheehan

## 2018-04-27 NOTE — ED NOTES
Pt discharged with all personal belonging, prescriptions, and discharge instructions. Pt states understanding and denies any further needs/ concerns at this time. Pt via [facility] from ED.

## 2023-06-11 PROBLEM — R06.09 DYSPNEA ON EXERTION: Status: ACTIVE | Noted: 2023-06-11

## 2023-06-13 PROBLEM — E11.65 TYPE 2 DIABETES MELLITUS WITH HYPERGLYCEMIA, WITH LONG-TERM CURRENT USE OF INSULIN (HCC): Status: ACTIVE | Noted: 2023-06-13

## 2023-06-13 PROBLEM — E11.9 NEW ONSET TYPE 2 DIABETES MELLITUS (HCC): Status: ACTIVE | Noted: 2023-06-13

## 2023-06-13 PROBLEM — I10 ESSENTIAL HYPERTENSION: Status: ACTIVE | Noted: 2023-06-13

## 2023-06-13 PROBLEM — R60.0 PERIPHERAL EDEMA: Status: ACTIVE | Noted: 2023-06-13

## 2023-06-13 PROBLEM — I48.91 ATRIAL FIBRILLATION (HCC): Status: ACTIVE | Noted: 2023-06-13

## 2023-06-13 PROBLEM — Z79.4 TYPE 2 DIABETES MELLITUS WITH HYPERGLYCEMIA, WITH LONG-TERM CURRENT USE OF INSULIN (HCC): Status: ACTIVE | Noted: 2023-06-13

## 2023-06-13 PROBLEM — R60.9 PERIPHERAL EDEMA: Status: ACTIVE | Noted: 2023-06-13

## 2023-06-13 PROBLEM — R06.2 WHEEZING: Status: ACTIVE | Noted: 2023-06-13

## 2023-07-07 ENCOUNTER — OFFICE VISIT (OUTPATIENT)
Age: 63
End: 2023-07-07
Payer: OTHER GOVERNMENT

## 2023-07-07 VITALS
BODY MASS INDEX: 42.66 KG/M2 | HEIGHT: 72 IN | OXYGEN SATURATION: 94 % | DIASTOLIC BLOOD PRESSURE: 60 MMHG | SYSTOLIC BLOOD PRESSURE: 100 MMHG | RESPIRATION RATE: 16 BRPM | HEART RATE: 51 BPM | WEIGHT: 315 LBS

## 2023-07-07 DIAGNOSIS — E66.01 CLASS 3 SEVERE OBESITY DUE TO EXCESS CALORIES WITH BODY MASS INDEX (BMI) OF 45.0 TO 49.9 IN ADULT, UNSPECIFIED WHETHER SERIOUS COMORBIDITY PRESENT (HCC): ICD-10-CM

## 2023-07-07 DIAGNOSIS — Z79.4 TYPE 2 DIABETES MELLITUS WITH HYPERGLYCEMIA, WITH LONG-TERM CURRENT USE OF INSULIN (HCC): ICD-10-CM

## 2023-07-07 DIAGNOSIS — I48.91 ATRIAL FIBRILLATION STATUS POST CARDIOVERSION (HCC): Primary | ICD-10-CM

## 2023-07-07 DIAGNOSIS — R60.9 PERIPHERAL EDEMA: ICD-10-CM

## 2023-07-07 DIAGNOSIS — I48.0 PAROXYSMAL ATRIAL FIBRILLATION (HCC): ICD-10-CM

## 2023-07-07 DIAGNOSIS — I10 ESSENTIAL HYPERTENSION: ICD-10-CM

## 2023-07-07 DIAGNOSIS — E11.65 TYPE 2 DIABETES MELLITUS WITH HYPERGLYCEMIA, WITH LONG-TERM CURRENT USE OF INSULIN (HCC): ICD-10-CM

## 2023-07-07 PROBLEM — E66.813 CLASS 3 SEVERE OBESITY DUE TO EXCESS CALORIES IN ADULT: Status: ACTIVE | Noted: 2023-07-07

## 2023-07-07 PROCEDURE — 99214 OFFICE O/P EST MOD 30 MIN: CPT | Performed by: INTERNAL MEDICINE

## 2023-07-07 PROCEDURE — 93005 ELECTROCARDIOGRAM TRACING: CPT | Performed by: INTERNAL MEDICINE

## 2023-07-07 PROCEDURE — 3074F SYST BP LT 130 MM HG: CPT | Performed by: INTERNAL MEDICINE

## 2023-07-07 PROCEDURE — 93010 ELECTROCARDIOGRAM REPORT: CPT | Performed by: INTERNAL MEDICINE

## 2023-07-07 PROCEDURE — 3078F DIAST BP <80 MM HG: CPT | Performed by: INTERNAL MEDICINE

## 2023-07-07 PROCEDURE — 3051F HG A1C>EQUAL 7.0%<8.0%: CPT | Performed by: INTERNAL MEDICINE

## 2023-07-07 ASSESSMENT — ENCOUNTER SYMPTOMS
WHEEZING: 0
CHEST TIGHTNESS: 0
SHORTNESS OF BREATH: 0

## 2023-07-07 NOTE — PROGRESS NOTES
4 month follow up with Dr. Desirae Abdullahi scheduled.
Effort: Pulmonary effort is normal.      Breath sounds: Normal breath sounds. Musculoskeletal:         General: No swelling. Normal range of motion. Cervical back: Normal range of motion and neck supple. Right lower leg: No edema. Left lower leg: No edema. Skin:     General: Skin is warm and dry. Capillary Refill: Capillary refill takes more than 3 seconds. Neurological:      General: No focal deficit present. Mental Status: He is alert and oriented to person, place, and time. Psychiatric:         Mood and Affect: Mood normal.        Lab Results   Component Value Date/Time    CHOL 187 06/12/2023 05:20 AM    HDL 49 06/12/2023 05:20 AM       Lab Results   Component Value Date/Time    WBC 5.7 06/14/2023 05:09 AM    HGB 15.3 06/14/2023 05:09 AM    HCT 51.8 06/14/2023 05:09 AM     06/14/2023 05:09 AM    MCV 80.4 06/14/2023 05:09 AM        Lab Results   Component Value Date/Time    CHOL 187 06/12/2023 05:20 AM    HDL 49 06/12/2023 05:20 AM        ASSESSMENT  Grace Reno was seen today for atrial fibrillation and congestive heart failure. Diagnoses and all orders for this visit:    Atrial fibrillation status post cardioversion [facility])  -     EKG 12 Lead    Essential hypertension    Peripheral edema    Paroxysmal atrial fibrillation (HCC)    Type 2 diabetes mellitus with hyperglycemia, with long-term current use of insulin (HCC)    Class 3 severe obesity due to excess calories with body mass index (BMI) of 45.0 to 49.9 in adult, unspecified whether serious comorbidity present ([address])           Return in about 4 months (around 11/7/2023).      Electronically signed by Sulema Farr MD on 7/7/2023 at 12:07 PM           [address] Dick Dempsey 101 200  [place], [address]  [phone] (Q)  [phone] (F)    [address] [facility]  [address] [facility]  [phone] (P)  [phone] (F)    ATTENTION:   This medical record was transcribed using an electronic medical

## 2023-07-27 ENCOUNTER — TELEPHONE (OUTPATIENT)
Age: 63
End: 2023-07-27

## 2023-07-27 DIAGNOSIS — I50.32 CHRONIC HEART FAILURE WITH PRESERVED EJECTION FRACTION (HCC): Primary | ICD-10-CM

## 2023-07-27 RX ORDER — EMPAGLIFLOZIN 10 MG/1
10 TABLET, FILM COATED ORAL DAILY
COMMUNITY
Start: 2023-07-12

## 2023-07-27 RX ORDER — BLOOD-GLUCOSE METER
1 KIT MISCELLANEOUS 2 TIMES DAILY
COMMUNITY
Start: 2023-07-12

## 2023-07-27 RX ORDER — ROSUVASTATIN CALCIUM 20 MG/1
20 TABLET, COATED ORAL DAILY
COMMUNITY

## 2023-07-27 RX ORDER — METOPROLOL SUCCINATE 50 MG/1
50 TABLET, EXTENDED RELEASE ORAL DAILY
Qty: 90 TABLET | Refills: 3 | Status: SHIPPED | OUTPATIENT
Start: 2023-07-27

## 2023-07-27 NOTE — TELEPHONE ENCOUNTER
Shortness of Breath  (Newest Message First)  Lexi Woody MD  You 39 minutes ago (1:10 PM)     DD  Called - has FU appt 8/2 - can you call patient and convey we spoke. Added crestor 20. Sent entresto step 1, change his BB toiprl 25.

## 2023-07-27 NOTE — TELEPHONE ENCOUNTER
called and stated she can put in the zipcode [phone] and it should work,please advise    [phone]

## 2023-07-27 NOTE — TELEPHONE ENCOUNTER
Dr. Sue Jaime is requesting a call back from the nurse to discusspts lasix medication dosage.      Dr. Sue Jaime # [phone]

## 2023-07-27 NOTE — TELEPHONE ENCOUNTER
Spoke with [facility]ist at [facility]. We will stop telmisartan. Stop Metoprolol tartrate. Start Entresto 24/26 1 tab po BID. Start Toprol XL 50mg po QD. Continue lasix 20mg PO QD. FU with me 8/2.     RX's sent over

## 2023-07-27 NOTE — TELEPHONE ENCOUNTER
Spoke to pt about call to Dr Richard Harrell. Added Crestor 20 mg to his list. He will keep 8/2/23 appt.

## 2023-07-27 NOTE — TELEPHONE ENCOUNTER
Pt c/o wheezing x 1 week and today noticed that he was short of breath walking to office for an appt with pharmacist at PCP for his diabetes. He states he has gained 3 pounds this week and has had diet indiscretions with sodium. He takes Lasix 20 mg daily. Pharmacist also wanted to know if Betsey Busing would be a good choice for him and wanted to know if Metoprolol tartrate should be changed to long acting.

## 2023-07-28 ENCOUNTER — TELEPHONE (OUTPATIENT)
Age: 63
End: 2023-07-28

## 2023-07-28 DIAGNOSIS — I50.32 CHRONIC HEART FAILURE WITH PRESERVED EJECTION FRACTION (HCC): ICD-10-CM

## 2023-07-28 NOTE — TELEPHONE ENCOUNTER
Pt is calling because he needs a refill on 2 medications. Pt needs Entresto 24/26 and Toprol XlL 50 mg. Pt gets his medicine from [facility]. Lisbeth Bennett was supposed to had call these in yesterday for the patient.     [address], [address] [place] [address] [phone] patient

## 2023-07-28 NOTE — TELEPHONE ENCOUNTER
Received call from patient's pharmacy - Rodrigue Vines (pharmacist), ID verified using two patient identifiers. Dr. Agus Vines is calling to see if the patient's entresto prescription can be sent to them. The refill was sent to patient's [address] by mistake. Refill sent and confirmation received by Dr. Agus Vines.     Requested Prescriptions     Signed Prescriptions Disp Refills    sacubitril-valsartan (ENTRESTO) 24-26 MG per tablet 180 tablet 3     Sig: Take 1 tablet by mouth 2 times daily     Authorizing Provider: Marysol Rojo     Ordering User: Eugene Blanton

## 2023-07-28 NOTE — TELEPHONE ENCOUNTER
Clint Qureshi is calling because they need the Entresto rx to be sent over to the pharmacy    [facility]  [address] [place], [place] [phone]

## 2023-08-02 ENCOUNTER — OFFICE VISIT (OUTPATIENT)
Age: 63
End: 2023-08-02
Payer: OTHER GOVERNMENT

## 2023-08-02 VITALS
DIASTOLIC BLOOD PRESSURE: 110 MMHG | HEIGHT: 72 IN | BODY MASS INDEX: 42.66 KG/M2 | WEIGHT: 315 LBS | HEART RATE: 111 BPM | OXYGEN SATURATION: 95 % | SYSTOLIC BLOOD PRESSURE: 138 MMHG

## 2023-08-02 DIAGNOSIS — Z79.4 TYPE 2 DIABETES MELLITUS WITH HYPERGLYCEMIA, WITH LONG-TERM CURRENT USE OF INSULIN (HCC): ICD-10-CM

## 2023-08-02 DIAGNOSIS — I10 ESSENTIAL HYPERTENSION: ICD-10-CM

## 2023-08-02 DIAGNOSIS — I50.32 CHRONIC HEART FAILURE WITH PRESERVED EJECTION FRACTION (HCC): Primary | ICD-10-CM

## 2023-08-02 DIAGNOSIS — I48.0 PAROXYSMAL ATRIAL FIBRILLATION (HCC): ICD-10-CM

## 2023-08-02 DIAGNOSIS — E11.65 TYPE 2 DIABETES MELLITUS WITH HYPERGLYCEMIA, WITH LONG-TERM CURRENT USE OF INSULIN (HCC): ICD-10-CM

## 2023-08-02 DIAGNOSIS — E66.01 CLASS 3 SEVERE OBESITY DUE TO EXCESS CALORIES WITH BODY MASS INDEX (BMI) OF 45.0 TO 49.9 IN ADULT, UNSPECIFIED WHETHER SERIOUS COMORBIDITY PRESENT (HCC): ICD-10-CM

## 2023-08-02 PROCEDURE — 99214 OFFICE O/P EST MOD 30 MIN: CPT | Performed by: INTERNAL MEDICINE

## 2023-08-02 PROCEDURE — 3051F HG A1C>EQUAL 7.0%<8.0%: CPT | Performed by: INTERNAL MEDICINE

## 2023-08-02 PROCEDURE — 93005 ELECTROCARDIOGRAM TRACING: CPT | Performed by: INTERNAL MEDICINE

## 2023-08-02 PROCEDURE — 3080F DIAST BP >= 90 MM HG: CPT | Performed by: INTERNAL MEDICINE

## 2023-08-02 PROCEDURE — 3075F SYST BP GE 130 - 139MM HG: CPT | Performed by: INTERNAL MEDICINE

## 2023-08-02 PROCEDURE — 93010 ELECTROCARDIOGRAM REPORT: CPT | Performed by: INTERNAL MEDICINE

## 2023-08-02 ASSESSMENT — ENCOUNTER SYMPTOMS
SHORTNESS OF BREATH: 0
CHEST TIGHTNESS: 0
WHEEZING: 0

## 2023-08-02 NOTE — PROGRESS NOTES
Estefanía Angela MD, MS, [facility]            HISTORY OF PRESENT ILLNESS:    Breanne Cleveland is a 61 y.o. male presents today for had concerns including Atrial Fibrillation and Hypertension. Recent admission [facility]. AFIB/HFpEF - plan for CASEY + DCCV 6/14/2023. Eliquis started. CCB changed to BB. BP control. IV lasix, transition to PO upon discharge. Outpatient TOMMY screening. DM2    PMH of HTN (telmisartan-HCTZ and diltiazem CD), morbid obesity, pre-diabetes, recent pneumonia, who presents with chief c/o of SOB and wheezing. Reports he has been SOB for past few months since he was diagnosed with pneumonia 3 months ago. Reports some BLE edema. No chest pain reported. He reports he had a sleep study over 5 years ago which did not demonstrate TOMMY but he has since gained weight. In ER, his CXR showed cardiomegaly but no acute intrathoracic disease. BNP was 424, Ddimer 0.88. He was also noted to be in afib, rate controlled on 12 lead EKG. He denies any prior history of afib. A1c=7.6. HDL 49, .8, triglyceride 106, creatinine 1.25. Hgb 13.8. Mg=2.2, K hemolyzed. Creatinine was 1.41 on admission now 1.25. In the interim, saw [facility] Pharmacist Dr. Mino Perez who recommended switching telmisartan to [facility], beta-blocker to long-acting Toprol, which we did. He was initiated on step 1 Entresto and Toprol XL 50. He was at the dentist today and his blood pressure was elevated. 138/110. He otherwise feels well. Weight seems stable. Discussed increasing Entresto to step 2, continue beta-blocker, Lasix. Discussed continuing course - weight loss, TOMMY eval.  Discussed ablation and watchman, possibly down the road.      SUMMARY:   Patient Active Problem List   Diagnosis    Dyspnea on exertion    Type 2 diabetes mellitus with hyperglycemia, with long-term current use of insulin (HCC)    Atrial fibrillation (HCC)    Essential hypertension    Peripheral edema    Wheezing    Class 3 severe

## 2023-08-08 ENCOUNTER — TELEPHONE (OUTPATIENT)
Age: 63
End: 2023-08-08

## 2023-08-08 NOTE — TELEPHONE ENCOUNTER
----- Message from [place] sent at 8/7/2023  3:22 PM EDT -----  Regarding: cardiac rehab  Thank you for the referral for cardiac rehab. Unfortunately Mr. Yael Overton does not qualify for CR with a HF diagnosis. His EF must be 35% or less to qualify and his latest is 45-50%. The other diagnosis listed do not concern CR, therefore will not qualify him either. Lyn Rock, can you please let your patient know that his insurance will not cover cardiac rehab for the reason listed above, as he is already an established patient in your clinic?      We truly appreciate the referral though,  Christiano Trotter

## 2023-08-08 NOTE — TELEPHONE ENCOUNTER
[facility] convey message below and tor return call with any questions or concerns.

## 2023-08-15 ASSESSMENT — SLEEP AND FATIGUE QUESTIONNAIRES
HOW LIKELY ARE YOU TO NOD OFF OR FALL ASLEEP WHILE SITTING AND READING: 0
DO YOU HAVE DIFFICULTY WATCHING A MOVIE OR VIDEO BECAUSE YOU BECOME SLEEPY OR TIRED: NO
DO YOU HAVE DIFFICULTY OPERATING A MOTOR VEHICLE FOR SHORT DISTANCES (LESS THAN 100 MILES) BECAUSE YOU BECOME SLEEPY: NO
HOW LIKELY ARE YOU TO NOD OFF OR FALL ASLEEP WHILE WATCHING TV: 0
DO YOU HAVE DIFFICULTY VISITING YOUR FAMILY OR FRIENDS IN THEIR HOME BECAUSE YOU BECOME SLEEPY OR TIRED: NO
DO YOU HAVE DIFFICULTY BEING AS ACTIVE AS YOU WANT TO BE IN THE EVENING BECAUSE YOU ARE SLEEPY OR TIRED: NO
HOW LIKELY ARE YOU TO NOD OFF OR FALL ASLEEP IN A CAR, WHILE STOPPED FOR A FEW MINUTES IN TRAFFIC: WOULD NEVER DOZE
AVERAGE NUMBER OF SLEEP HOURS: 8
HOW LIKELY ARE YOU TO NOD OFF OR FALL ASLEEP WHILE WATCHING TV: WOULD NEVER DOZE
DO YOU GET TOO LITTLE SLEEP AT NIGHT: NO
HOW LIKELY ARE YOU TO NOD OFF OR FALL ASLEEP WHILE SITTING INACTIVE IN A PUBLIC PLACE: 0
ARE YOU BOTHERED BY WAKING UP TOO EARLY AND NOT BEING ABLE TO GET BACK TO SLEEP: IS NOT
DO YOU WORK SHIFTS: NO
FOSQ SCORE: 20
HOW LIKELY ARE YOU TO NOD OFF OR FALL ASLEEP WHILE LYING DOWN TO REST IN THE AFTERNOON WHEN CIRCUMSTANCES PERMIT: SLIGHT CHANCE OF DOZING
NECK CIRCUMFERENCE (INCHES): 18.25
SELECT ANY OF THE FOLLOWING BEHAVIORS OBSERVED WHILE YOU ARE ASLEEP: NONE OF THE ABOVE
HOW LIKELY ARE YOU TO NOD OFF OR FALL ASLEEP WHILE SITTING AND READING: WOULD NEVER DOZE
DO YOU GENERALLY HAVE DIFFICULTY REMEMBERING THINGS BECAUSE YOU ARE SLEEPY OR TIRED: NO
DO YOU GET TOO LITTLE SLEEP AT NIGHT: DOES NOT
AVERAGE NUMBER OF SLEEP HOURS: 8
NUMBER OF TIMES YOU WAKE PER NIGHT: 1
HOW LONG DO YOU NAP: 45 MINUTES TO 1 HOUR
DO YOU HAVE DIFFICULTY OPERATING A MOTOR VEHICLE FOR LONG DISTANCES (GREATER THAN 100 MILES) BECAUSE YOU BECOME SLEEPY: NO
ESS TOTAL SCORE: 1
HOW LIKELY ARE YOU TO NOD OFF OR FALL ASLEEP WHILE SITTING INACTIVE IN A PUBLIC PLACE: WOULD NEVER DOZE
HOW LIKELY ARE YOU TO NOD OFF OR FALL ASLEEP WHILE SITTING QUIETLY AFTER LUNCH WITHOUT ALCOHOL: 0
HOW LIKELY ARE YOU TO NOD OFF OR FALL ASLEEP WHEN YOU ARE A PASSENGER IN A CAR FOR AN HOUR WITHOUT A BREAK: 0
DO YOU HAVE DIFFICULTY CONCENTRATING ON THE THINGS YOU DO BECAUSE YOU ARE SLEEPY OR TIRED: NO
HAS YOUR RELATIONSHIP WITH FAMILY, FRIENDS OR WORK COLLEAGUES BEEN AFFECTED BECAUSE YOU ARE SLEEPY OR TIRED: NO
ARE YOU BOTHERED BY WAKING UP TOO EARLY AND NOT BEING ABLE TO GET BACK TO SLEEP: NO
HOW LIKELY ARE YOU TO NOD OFF OR FALL ASLEEP IN A CAR, WHILE STOPPED FOR A FEW MINUTES IN TRAFFIC: 0
HAS YOUR MOOD BEEN AFFECTED BECAUSE YOU ARE SLEEPY OR TIRED: NO
HOW LIKELY ARE YOU TO NOD OFF OR FALL ASLEEP WHILE SITTING QUIETLY AFTER LUNCH WITHOUT ALCOHOL: WOULD NEVER DOZE
HOW LIKELY ARE YOU TO NOD OFF OR FALL ASLEEP WHEN YOU ARE A PASSENGER IN A CAR FOR AN HOUR WITHOUT A BREAK: WOULD NEVER DOZE
DO YOU HAVE PROBLEMS WITH FREQUENT AWAKENINGS AT NIGHT: NO
HOW LIKELY ARE YOU TO NOD OFF OR FALL ASLEEP WHILE SITTING AND TALKING TO SOMEONE: WOULD NEVER DOZE
DO YOU HAVE DIFFICULTY BEING AS ACTIVE AS YOU WANT TO BE IN THE MORNING BECAUSE YOU ARE SLEEPY OR TIRED: NO
HOW LIKELY ARE YOU TO NOD OFF OR FALL ASLEEP WHILE SITTING AND TALKING TO SOMEONE: 0
HOW LIKELY ARE YOU TO NOD OFF OR FALL ASLEEP WHILE LYING DOWN TO REST IN THE AFTERNOON WHEN CIRCUMSTANCES PERMIT: 1
HOW MANY NAPS DO YOU TAKE PER WEEK: 2
DO YOU TAKE NAPS: YES

## 2023-08-16 ENCOUNTER — TELEPHONE (OUTPATIENT)
Age: 63
End: 2023-08-16

## 2023-08-16 NOTE — TELEPHONE ENCOUNTER
Ligia parker/[facility] called requesting speak with Nurse not able get PT BP under control  would like a callback.       ZY#[phone]

## 2023-08-17 ENCOUNTER — OFFICE VISIT (OUTPATIENT)
Age: 63
End: 2023-08-17
Payer: OTHER GOVERNMENT

## 2023-08-17 VITALS
BODY MASS INDEX: 42.66 KG/M2 | WEIGHT: 315 LBS | HEART RATE: 70 BPM | SYSTOLIC BLOOD PRESSURE: 149 MMHG | DIASTOLIC BLOOD PRESSURE: 89 MMHG | OXYGEN SATURATION: 94 % | HEIGHT: 72 IN | TEMPERATURE: 97.4 F

## 2023-08-17 DIAGNOSIS — I10 PRIMARY HYPERTENSION: ICD-10-CM

## 2023-08-17 DIAGNOSIS — I48.0 PAROXYSMAL ATRIAL FIBRILLATION (HCC): ICD-10-CM

## 2023-08-17 DIAGNOSIS — G47.33 OSA (OBSTRUCTIVE SLEEP APNEA): Primary | ICD-10-CM

## 2023-08-17 PROCEDURE — 3077F SYST BP >= 140 MM HG: CPT | Performed by: INTERNAL MEDICINE

## 2023-08-17 PROCEDURE — 99204 OFFICE O/P NEW MOD 45 MIN: CPT | Performed by: INTERNAL MEDICINE

## 2023-08-17 PROCEDURE — 3079F DIAST BP 80-89 MM HG: CPT | Performed by: INTERNAL MEDICINE

## 2023-08-17 NOTE — PATIENT INSTRUCTIONS
[address] [facility] Chris Warner, [address]  Tel.  [phone]  Fax. [address] [facility], [address] [place] [address]  Tel.  [phone]  Fax. [phone] [facility], [address] [facility]  Tel.  [phone]  Fax. [phone]     Sleep Apnea: After Your Visit  Your Care Instructions  Sleep apnea occurs when you frequently stop breathing for 10 seconds or longer during sleep. It can be mild to severe, based on the number of times per hour that you stop breathing or have slowed breathing. Blocked or narrowed airways in your nose, mouth, or throat can cause sleep apnea. Your airway can become blocked when your throat muscles and tongue relax during sleep. Sleep apnea is common, occurring in 1 out of 20 individuals. Individuals having any of the following characteristics should be evaluated and treated right away due to high risk and detrimental consequences from untreated sleep apnea:  Obesity  Congestive Heart failure  Atrial Fibrillation  Uncontrolled Hypertension  Type II Diabetes  Night-time Arrhythmias  Stroke  Pulmonary Hypertension  High-risk Driving Populations (pilots, truck drivers, etc.)  Patients Considering Weight-loss Surgery    How do you know you have sleep apnea? You probably have sleep apnea if you answer 'yes' to 3 or more of the following questions:  S - Have you been told that you Snore? T - Are you often Tired during the day? O - Has anyone Observed you stop breathing while sleeping? P- Do you have (or are being treated for) high blood Pressure? B - Are you obese (Body Mass Index > 35)? A - Is your Age 48years old or older? N - Is your Neck size greater than 16 inches? G - Are you male Gender? A sleep physician can prescribe a breathing device that prevents tissues in the throat from blocking your airway. Or your doctor may recommend using a dental device (oral breathing device) to help keep your airway open.  In some cases, surgery may

## 2023-08-17 NOTE — TELEPHONE ENCOUNTER
Spoke with Dr. Petra Soliz who follows diabetes and monitors BP. She wanted to update us. Dr. Petra Soliz had increased Toprol XL to 100mg and as of yesterday trial of 150mg due to continued elevated Bps and HR. Dr. Petra Soliz will return call today to patient to follow up. Averages  140-150s/100s  -823     13th 139/102 108   15th 144/98 111, 151/105 109     Currently having Bursitis was taking Ibuprofen was told to stop that and use tylenol. Since Dr. Petra Soliz made changes she will monitor for now and patient will follow up next week with BP reading; Dr. Dejuan Yang to have patient come to her for follow up and bring his cuff to measure accuracy. Dr. Petra Soliz will return call if needed. current smoker, continue education current smoker, continue education

## 2023-08-17 NOTE — PROGRESS NOTES
[address] [facility], [address]  Tel.  [phone]    Fax. [address] [facility], [address] [place] [address]  Tel.  [phone]    Fax. [phone]     [facility], [address] [facility]  Tel.  [phone]    Fax. [phone] is a 61y.o. year old male seen for evaluation of a sleep disorder. ASSESSMENT/PLAN:     Diagnosis Orders   1. TOMMY (obstructive sleep apnea)  SLEEP STUDY FULL      2. Paroxysmal atrial fibrillation (HCC)        3. Primary hypertension        4. BMI 45.0-49.9, adult [facility])            Patient has a history and examination consistent with the diagnosis of sleep apnea. Return for for follow-up after testing is completed. * The patient currently has a Moderate Risk for having sleep apnea. STOP-BANG score 5.    * Sleep testing was ordered for initial evaluation. Orders Placed This Encounter   Procedures    SLEEP STUDY FULL     Standing Status:   Future     Standing Expiration Date:   8/17/2024     Scheduling Instructions:      Perform ETCO2 monitoring during Polysomnography       * He was provided information on sleep apnea including corresponding risk factors and the importance of proper treatment. * Treatment options were reviewed in detail. he would like to proceed with PAP therapy. Patient will be seen in follow-up in 6-8 weeks after PAP setup to gauge treatment response and adherence to therapy. * The patient was counseled regarding proper sleep hygiene, with emphasis on ensuring sufficient total sleep time; safe driving and the benefits of exercise and weight loss. * All of his questions were addressed. 2. Paroxysmal atrial fibrillation (HCC) - He is currently taking apixaban (ELIQUIS) 5 MG TABS tablet and metoprolol succinate (TOPROL-XL) 50 mg XL tablet.  He will continue on current regimen, monitor his pulse

## 2023-09-06 ENCOUNTER — HOSPITAL ENCOUNTER (OUTPATIENT)
Facility: HOSPITAL | Age: 63
Discharge: HOME | End: 2023-09-09
Payer: OTHER GOVERNMENT

## 2023-09-06 DIAGNOSIS — G47.33 OSA (OBSTRUCTIVE SLEEP APNEA): ICD-10-CM

## 2023-09-06 PROCEDURE — 95810 POLYSOM 6/> YRS 4/> PARAM: CPT | Performed by: INTERNAL MEDICINE

## 2023-09-07 ENCOUNTER — TELEPHONE (OUTPATIENT)
Age: 63
End: 2023-09-07

## 2023-09-07 VITALS
OXYGEN SATURATION: 95 % | HEART RATE: 60 BPM | HEIGHT: 73 IN | SYSTOLIC BLOOD PRESSURE: 145 MMHG | WEIGHT: 315 LBS | TEMPERATURE: 97.5 F | BODY MASS INDEX: 41.75 KG/M2 | DIASTOLIC BLOOD PRESSURE: 92 MMHG

## 2023-09-07 DIAGNOSIS — G47.31 CENTRAL SLEEP APNEA: Primary | ICD-10-CM

## 2023-09-07 NOTE — FLOWSHEET NOTE
DID YOU HAVE ANY PROBLEMS SLEEPING IN THE LAB?  - YES, COULD 'NT GET COMFORTABLE     COMMENT ON YOUR EXPERIENCE AT THE SLEEP CENTER:  - EVERYTHING WAS WELL DONE.   [address] [facility]

## 2023-09-07 NOTE — PROGRESS NOTES
Sleep Study Technical Notes   Disclaimer:  all notes have not been confirmed by interpreting physician      PRE-Test:  Lalita Darby  (: 1960) is here for a PSG with ETC02 study. Order and chart reviewed by tech. Patient is a 61year old male with history of restless sleep, snoring, and excessive daytime sleepiness. The patient was taken directly to his/her room. Procedure explained to the patient and questions were answered. The patient expressed understanding of the procedure. Electrodes were applied without incident. The patient was placed in bed and the study was started. Acquisition Notes:  Lights off: 9:55 PM    Respiratory events:   RERAS, CENTRAL APNEAS, HYPOPNEAS, MIXED APNEAS, AND OBSTRUCTIVE APNEA  ECG:  IRREGULAR RHYTHM  Snoring:  MILD (MOUTH BREATH\ING OBSERVED)  Sleep Stages:  1 AND 2  Sleep Positions:  LEFT AND RIGHT SIDES  PAP titration information in Sleep Study CPAP Evaluation  Patient slept with positional therapy:  NO  Patient slept with head of bed elevated:  YES - 10 DEGREES  Supine sleep assessed per physician order:  NO  If not, why?? NOT ORDERED  Patient wore an oral appliance:  NO  Other comments: OVERALL AHI AT THE END OF THE STUDY = 103.9                                    AVERAGE SA02 95% AND LOW SA02 87%                                   LMI = 0.0                                  AVERAGE ETC02 = 31  Patient had caregiver in attendance:  NO  Patient watched TV or on phone after lights out:  YES -  15 MINUTES   Patient to bathroom 4 times  Pediatric Patient:  NA  Parent accompanied patient: NA  Parent slept in bed with patient: NA    POST Test:  Patient was awakened at 5:43 am.  Electrodes were removed. The patient was discharged after answering the Post Questionnaire. Equipment and room cleaned per infection control policy.

## 2023-09-15 NOTE — TELEPHONE ENCOUNTER
Jimena Starr Sr. is to be contacted by sleep technologists regarding results of Sleep Testing which was indicative of an average AHI of 104.3 per hour with an SpO2 danna of 87%, the duration of SpO2 <88% was 0.00 minutes. * A second polysomnogram has been ordered for mask fitting, PAP desensitizing protocol, and pressure titration. Encounter Diagnosis   Name Primary?     Central sleep apnea Yes       Orders Placed This Encounter   Procedures    SLEEP LAB (PAP TITRATION)     Standing Status:   Future     Standing Expiration Date:   9/14/2024

## 2023-09-15 NOTE — TELEPHONE ENCOUNTER
Left voicemail to review sleep study results and message has been sent to [facility] SERVICES. STAT. Please schedule TITRATION study.     Thanks

## 2023-09-22 ENCOUNTER — CLINICAL DOCUMENTATION (OUTPATIENT)
Age: 63
End: 2023-09-22

## 2023-10-10 ENCOUNTER — HOSPITAL ENCOUNTER (OUTPATIENT)
Facility: HOSPITAL | Age: 63
Discharge: HOME | End: 2023-10-13
Payer: OTHER GOVERNMENT

## 2023-10-10 DIAGNOSIS — G47.31 CENTRAL SLEEP APNEA: ICD-10-CM

## 2023-10-10 PROCEDURE — 95811 POLYSOM 6/>YRS CPAP 4/> PARM: CPT | Performed by: INTERNAL MEDICINE

## 2023-10-11 ENCOUNTER — TELEPHONE (OUTPATIENT)
Age: 63
End: 2023-10-11

## 2023-10-11 VITALS
OXYGEN SATURATION: 95 % | BODY MASS INDEX: 41.75 KG/M2 | DIASTOLIC BLOOD PRESSURE: 77 MMHG | HEART RATE: 60 BPM | WEIGHT: 315 LBS | TEMPERATURE: 98 F | SYSTOLIC BLOOD PRESSURE: 128 MMHG | HEIGHT: 73 IN

## 2023-10-11 NOTE — PROGRESS NOTES
Sleep Study Technical Notes   Disclaimer:  all notes have not been confirmed by interpreting physician      PRE-Test:  Nolberto Campos Sr. (: 1960) arrived in the lobby. The patient was taken to the Sleep Center and taken directly to his/her room. Procedure explained to the patient and questions were answered. The patient expressed understanding of the procedure. Electrodes were applied without incident. The patient was placed in bed and the study was started. Acquisition Notes:  Lights off: 9:35 PM    Respiratory events:   A__Y    H__Y  C__Y  M__N  ECG:    Possible arrhythmias:   Y  Snoring:  None  Sleep Stages:  REM   Y    PAP titration information in Sleep Study CPAP Evaluation  Positional therapy with:   Patient slept with head of bed elevated:  Y  Other comments:     Patient was started on a pressure of cpap 5cm and fitted with the AirFit P10 nasal mask. He ended up not liking this mask in middle of night and stated he preferred to wear the Eson Nasal Large Mask which he did well with. Patient was titrated up on different pressures and ended up being switched to bipap for events and comfort. He was titrated up to 19/12. Patient had a hard time sleeping in the lab and requested to leave early. Patient had caregiver in attendance:  N  Patient watched TV and phone after lights out for 3 hours  Patient to bathroom 2 times      POST Test:  Patient was awakened. Electrodes were removed. The patient was discharged after answering the Post Questionnaire. Equipment and room cleaned per infection control policy.

## 2023-10-16 NOTE — TELEPHONE ENCOUNTER
Left Ventricle: Mildly reduced left ventricular systolic function with a visually estimated EF of 45 - 50%. Results of diagnostic testing (09-06-23) and the occurrence of CPAP and Bi-Level PAP failure on testing (10-10-23) discussed  with patient. Further treatment options at this point to include ASV titration versus Phrenic Nerve Stimulation Therapy   (REMEDE) discussed, offered patient a visit with Ms. Leroy to review Phrenic Nerve Stimulation ([facility]). He agreed to do so and indicated that he would think about the two options and would let us know after his visit.

## 2023-10-22 ENCOUNTER — APPOINTMENT (OUTPATIENT)
Facility: HOSPITAL | Age: 63
End: 2023-10-22
Payer: OTHER GOVERNMENT

## 2023-10-22 ENCOUNTER — HOSPITAL ENCOUNTER (INPATIENT)
Facility: HOSPITAL | Age: 63
LOS: 4 days | Discharge: HOME HEALTH | End: 2023-10-27
Attending: EMERGENCY MEDICINE | Admitting: FAMILY MEDICINE
Payer: OTHER GOVERNMENT

## 2023-10-22 DIAGNOSIS — R05.9 COUGH, UNSPECIFIED TYPE: ICD-10-CM

## 2023-10-22 DIAGNOSIS — M25.461 PAIN AND SWELLING OF RIGHT KNEE: ICD-10-CM

## 2023-10-22 DIAGNOSIS — M25.561 PAIN AND SWELLING OF RIGHT KNEE: ICD-10-CM

## 2023-10-22 DIAGNOSIS — R60.9 PERIPHERAL EDEMA: ICD-10-CM

## 2023-10-22 DIAGNOSIS — M10.9 ACUTE GOUTY ARTHRITIS: Primary | ICD-10-CM

## 2023-10-22 DIAGNOSIS — Z87.39 HISTORY OF GOUT: ICD-10-CM

## 2023-10-22 DIAGNOSIS — M25.561 ACUTE PAIN OF RIGHT KNEE: ICD-10-CM

## 2023-10-22 LAB
ALBUMIN SERPL-MCNC: 2.7 G/DL (ref 3.5–5)
ALBUMIN/GLOB SERPL: 0.5 (ref 1.1–2.2)
ALP SERPL-CCNC: 64 U/L (ref 45–117)
ALT SERPL-CCNC: 47 U/L (ref 12–78)
ANION GAP SERPL CALC-SCNC: 6 MMOL/L (ref 5–15)
AST SERPL-CCNC: 67 U/L (ref 15–37)
BASOPHILS # BLD: 0 K/UL (ref 0–0.1)
BASOPHILS NFR BLD: 0 % (ref 0–1)
BILIRUB SERPL-MCNC: 2.6 MG/DL (ref 0.2–1)
BUN SERPL-MCNC: 19 MG/DL (ref 6–20)
BUN/CREAT SERPL: 17 (ref 12–20)
CALCIUM SERPL-MCNC: 9.4 MG/DL (ref 8.5–10.1)
CHLORIDE SERPL-SCNC: 105 MMOL/L (ref 97–108)
CO2 SERPL-SCNC: 22 MMOL/L (ref 21–32)
COMMENT:: NORMAL
COMMENT:: NORMAL
CREAT SERPL-MCNC: 1.09 MG/DL (ref 0.7–1.3)
DIFFERENTIAL METHOD BLD: ABNORMAL
EOSINOPHIL # BLD: 0.1 K/UL (ref 0–0.4)
EOSINOPHIL NFR BLD: 1 % (ref 0–7)
ERYTHROCYTE [DISTWIDTH] IN BLOOD BY AUTOMATED COUNT: 18.4 % (ref 11.5–14.5)
GLOBULIN SER CALC-MCNC: 5.1 G/DL (ref 2–4)
GLUCOSE SERPL-MCNC: 138 MG/DL (ref 65–100)
HCT VFR BLD AUTO: 46.7 % (ref 36.6–50.3)
HGB BLD-MCNC: 13.9 G/DL (ref 12.1–17)
IMM GRANULOCYTES # BLD AUTO: 0 K/UL (ref 0–0.04)
IMM GRANULOCYTES NFR BLD AUTO: 0 % (ref 0–0.5)
LYMPHOCYTES # BLD: 1.3 K/UL (ref 0.8–3.5)
LYMPHOCYTES NFR BLD: 13 % (ref 12–49)
MCH RBC QN AUTO: 22.5 PG (ref 26–34)
MCHC RBC AUTO-ENTMCNC: 29.8 G/DL (ref 30–36.5)
MCV RBC AUTO: 75.4 FL (ref 80–99)
MONOCYTES # BLD: 1.3 K/UL (ref 0–1)
MONOCYTES NFR BLD: 13 % (ref 5–13)
NEUTS SEG # BLD: 7.5 K/UL (ref 1.8–8)
NEUTS SEG NFR BLD: 73 % (ref 32–75)
NRBC # BLD: 0 K/UL (ref 0–0.01)
NRBC BLD-RTO: 0 PER 100 WBC
PLATELET # BLD AUTO: 264 K/UL (ref 150–400)
PMV BLD AUTO: 9.5 FL (ref 8.9–12.9)
POTASSIUM SERPL-SCNC: 4.6 MMOL/L (ref 3.5–5.1)
PROT SERPL-MCNC: 7.8 G/DL (ref 6.4–8.2)
RBC # BLD AUTO: 6.19 M/UL (ref 4.1–5.7)
SODIUM SERPL-SCNC: 133 MMOL/L (ref 136–145)
SPECIMEN HOLD: NORMAL
SPECIMEN HOLD: NORMAL
URATE SERPL-MCNC: 4.4 MG/DL (ref 3.5–7.2)
WBC # BLD AUTO: 10.2 K/UL (ref 4.1–11.1)

## 2023-10-22 PROCEDURE — 80053 COMPREHEN METABOLIC PANEL: CPT

## 2023-10-22 PROCEDURE — 73562 X-RAY EXAM OF KNEE 3: CPT

## 2023-10-22 PROCEDURE — 96374 THER/PROPH/DIAG INJ IV PUSH: CPT

## 2023-10-22 PROCEDURE — 96375 TX/PRO/DX INJ NEW DRUG ADDON: CPT

## 2023-10-22 PROCEDURE — 83036 HEMOGLOBIN GLYCOSYLATED A1C: CPT

## 2023-10-22 PROCEDURE — 84550 ASSAY OF BLOOD/URIC ACID: CPT

## 2023-10-22 PROCEDURE — 85025 COMPLETE CBC W/AUTO DIFF WBC: CPT

## 2023-10-22 PROCEDURE — 36415 COLL VENOUS BLD VENIPUNCTURE: CPT

## 2023-10-22 PROCEDURE — 6360000002 HC RX W HCPCS: Performed by: EMERGENCY MEDICINE

## 2023-10-22 PROCEDURE — 99285 EMERGENCY DEPT VISIT HI MDM: CPT

## 2023-10-22 RX ORDER — MORPHINE SULFATE 4 MG/ML
8 INJECTION, SOLUTION INTRAMUSCULAR; INTRAVENOUS
Status: COMPLETED | OUTPATIENT
Start: 2023-10-22 | End: 2023-10-22

## 2023-10-22 RX ORDER — DEXTROMETHORPHAN HYDROBROMIDE AND PROMETHAZINE HYDROCHLORIDE 15; 6.25 MG/5ML; MG/5ML
5 SYRUP ORAL 4 TIMES DAILY PRN
Qty: 50 ML | Refills: 0 | Status: SHIPPED | OUTPATIENT
Start: 2023-10-22 | End: 2023-10-27 | Stop reason: HOSPADM

## 2023-10-22 RX ORDER — KETOROLAC TROMETHAMINE 30 MG/ML
15 INJECTION, SOLUTION INTRAMUSCULAR; INTRAVENOUS ONCE
Status: COMPLETED | OUTPATIENT
Start: 2023-10-22 | End: 2023-10-22

## 2023-10-22 RX ORDER — ONDANSETRON 2 MG/ML
4 INJECTION INTRAMUSCULAR; INTRAVENOUS ONCE
Status: COMPLETED | OUTPATIENT
Start: 2023-10-22 | End: 2023-10-22

## 2023-10-22 RX ORDER — INDOMETHACIN 50 MG/1
50 CAPSULE ORAL
Qty: 15 CAPSULE | Refills: 0 | Status: SHIPPED | OUTPATIENT
Start: 2023-10-22 | End: 2023-10-27 | Stop reason: HOSPADM

## 2023-10-22 RX ADMIN — ONDANSETRON 4 MG: 2 INJECTION INTRAMUSCULAR; INTRAVENOUS at 22:09

## 2023-10-22 RX ADMIN — KETOROLAC TROMETHAMINE 15 MG: 30 INJECTION, SOLUTION INTRAMUSCULAR at 22:09

## 2023-10-22 RX ADMIN — MORPHINE SULFATE 8 MG: 4 INJECTION, SOLUTION INTRAMUSCULAR; INTRAVENOUS at 22:09

## 2023-10-22 ASSESSMENT — PAIN SCALES - GENERAL: PAINLEVEL_OUTOF10: 10

## 2023-10-23 ENCOUNTER — APPOINTMENT (OUTPATIENT)
Facility: HOSPITAL | Age: 63
End: 2023-10-23
Payer: OTHER GOVERNMENT

## 2023-10-23 PROBLEM — M25.561 RIGHT KNEE PAIN: Status: ACTIVE | Noted: 2023-10-23

## 2023-10-23 LAB
APPEARANCE SNV: ABNORMAL
BODY FLD TYPE: NORMAL
COLOR SNV: YELLOW
CRYSTALS FLD MICRO: NORMAL
D DIMER PPP FEU-MCNC: 1.12 MG/L FEU (ref 0–0.65)
EKG ATRIAL RATE: 83 BPM
EKG DIAGNOSIS: NORMAL
EKG P AXIS: 112 DEGREES
EKG P-R INTERVAL: 180 MS
EKG Q-T INTERVAL: 368 MS
EKG QRS DURATION: 84 MS
EKG QTC CALCULATION (BAZETT): 432 MS
EKG R AXIS: -30 DEGREES
EKG T AXIS: 8 DEGREES
EKG VENTRICULAR RATE: 83 BPM
EST. AVERAGE GLUCOSE BLD GHB EST-MCNC: 154 MG/DL
GLUCOSE BLD STRIP.AUTO-MCNC: 120 MG/DL (ref 65–117)
GLUCOSE BLD STRIP.AUTO-MCNC: 146 MG/DL (ref 65–117)
HBA1C MFR BLD: 7 % (ref 4–5.6)
LYMPHOCYTES NFR SNV MANUAL: 1 % (ref 0–15)
MONOCYTES NFR SNV MANUAL: 3 % (ref 0–65)
NEUTROPHILS NFR SNV MANUAL: 96 % (ref 0–20)
RBC # SNV: >100 /CU MM
SERVICE CMNT-IMP: ABNORMAL
SERVICE CMNT-IMP: ABNORMAL
SPECIMEN SOURCE FLD: ABNORMAL
WBC # SNV: ABNORMAL /CU MM (ref 0–150)

## 2023-10-23 PROCEDURE — 36415 COLL VENOUS BLD VENIPUNCTURE: CPT

## 2023-10-23 PROCEDURE — 71045 X-RAY EXAM CHEST 1 VIEW: CPT

## 2023-10-23 PROCEDURE — 97165 OT EVAL LOW COMPLEX 30 MIN: CPT

## 2023-10-23 PROCEDURE — 1100000000 HC RM PRIVATE

## 2023-10-23 PROCEDURE — 87205 SMEAR GRAM STAIN: CPT

## 2023-10-23 PROCEDURE — 20610 DRAIN/INJ JOINT/BURSA W/O US: CPT | Performed by: PHYSICIAN ASSISTANT

## 2023-10-23 PROCEDURE — 0S9C3ZZ DRAINAGE OF RIGHT KNEE JOINT, PERCUTANEOUS APPROACH: ICD-10-PCS | Performed by: PHYSICIAN ASSISTANT

## 2023-10-23 PROCEDURE — 2580000003 HC RX 258: Performed by: FAMILY MEDICINE

## 2023-10-23 PROCEDURE — 76705 ECHO EXAM OF ABDOMEN: CPT

## 2023-10-23 PROCEDURE — 97530 THERAPEUTIC ACTIVITIES: CPT

## 2023-10-23 PROCEDURE — 87070 CULTURE OTHR SPECIMN AEROBIC: CPT

## 2023-10-23 PROCEDURE — 99283 EMERGENCY DEPT VISIT LOW MDM: CPT | Performed by: PHYSICIAN ASSISTANT

## 2023-10-23 PROCEDURE — 6370000000 HC RX 637 (ALT 250 FOR IP): Performed by: FAMILY MEDICINE

## 2023-10-23 PROCEDURE — 82962 GLUCOSE BLOOD TEST: CPT

## 2023-10-23 PROCEDURE — 97161 PT EVAL LOW COMPLEX 20 MIN: CPT

## 2023-10-23 PROCEDURE — 6370000000 HC RX 637 (ALT 250 FOR IP)

## 2023-10-23 PROCEDURE — 3E0U3GC INTRODUCTION OF OTHER THERAPEUTIC SUBSTANCE INTO JOINTS, PERCUTANEOUS APPROACH: ICD-10-PCS | Performed by: PHYSICIAN ASSISTANT

## 2023-10-23 PROCEDURE — 94760 N-INVAS EAR/PLS OXIMETRY 1: CPT

## 2023-10-23 PROCEDURE — 89060 EXAM SYNOVIAL FLUID CRYSTALS: CPT

## 2023-10-23 PROCEDURE — 89050 BODY FLUID CELL COUNT: CPT

## 2023-10-23 PROCEDURE — 93005 ELECTROCARDIOGRAM TRACING: CPT | Performed by: FAMILY MEDICINE

## 2023-10-23 PROCEDURE — 85379 FIBRIN DEGRADATION QUANT: CPT

## 2023-10-23 PROCEDURE — 87015 SPECIMEN INFECT AGNT CONCNTJ: CPT

## 2023-10-23 PROCEDURE — 6360000002 HC RX W HCPCS

## 2023-10-23 PROCEDURE — 93010 ELECTROCARDIOGRAM REPORT: CPT | Performed by: SPECIALIST

## 2023-10-23 RX ORDER — ONDANSETRON 4 MG/1
4 TABLET, ORALLY DISINTEGRATING ORAL EVERY 8 HOURS PRN
Status: DISCONTINUED | OUTPATIENT
Start: 2023-10-23 | End: 2023-10-27 | Stop reason: HOSPADM

## 2023-10-23 RX ORDER — INSULIN LISPRO 100 [IU]/ML
0-4 INJECTION, SOLUTION INTRAVENOUS; SUBCUTANEOUS
Status: DISCONTINUED | OUTPATIENT
Start: 2023-10-23 | End: 2023-10-27 | Stop reason: HOSPADM

## 2023-10-23 RX ORDER — INSULIN LISPRO 100 [IU]/ML
0-4 INJECTION, SOLUTION INTRAVENOUS; SUBCUTANEOUS NIGHTLY
Status: DISCONTINUED | OUTPATIENT
Start: 2023-10-23 | End: 2023-10-27 | Stop reason: HOSPADM

## 2023-10-23 RX ORDER — SODIUM CHLORIDE 0.9 % (FLUSH) 0.9 %
5-40 SYRINGE (ML) INJECTION PRN
Status: DISCONTINUED | OUTPATIENT
Start: 2023-10-23 | End: 2023-10-27 | Stop reason: HOSPADM

## 2023-10-23 RX ORDER — TRAMADOL HYDROCHLORIDE 50 MG/1
50 TABLET ORAL EVERY 6 HOURS PRN
Status: DISCONTINUED | OUTPATIENT
Start: 2023-10-23 | End: 2023-10-24

## 2023-10-23 RX ORDER — ALLOPURINOL 100 MG/1
100 TABLET ORAL DAILY
Status: DISCONTINUED | OUTPATIENT
Start: 2023-10-23 | End: 2023-10-27 | Stop reason: HOSPADM

## 2023-10-23 RX ORDER — ACETAMINOPHEN 650 MG/1
650 SUPPOSITORY RECTAL EVERY 6 HOURS PRN
Status: DISCONTINUED | OUTPATIENT
Start: 2023-10-23 | End: 2023-10-27 | Stop reason: HOSPADM

## 2023-10-23 RX ORDER — SODIUM CHLORIDE 0.9 % (FLUSH) 0.9 %
5-40 SYRINGE (ML) INJECTION EVERY 12 HOURS SCHEDULED
Status: DISCONTINUED | OUTPATIENT
Start: 2023-10-23 | End: 2023-10-27 | Stop reason: HOSPADM

## 2023-10-23 RX ORDER — ONDANSETRON 2 MG/ML
4 INJECTION INTRAMUSCULAR; INTRAVENOUS EVERY 6 HOURS PRN
Status: DISCONTINUED | OUTPATIENT
Start: 2023-10-23 | End: 2023-10-27 | Stop reason: HOSPADM

## 2023-10-23 RX ORDER — ENOXAPARIN SODIUM 100 MG/ML
40 INJECTION SUBCUTANEOUS DAILY
Status: DISCONTINUED | OUTPATIENT
Start: 2023-10-23 | End: 2023-10-23

## 2023-10-23 RX ORDER — POLYETHYLENE GLYCOL 3350 17 G/17G
17 POWDER, FOR SOLUTION ORAL DAILY PRN
Status: DISCONTINUED | OUTPATIENT
Start: 2023-10-23 | End: 2023-10-27 | Stop reason: HOSPADM

## 2023-10-23 RX ORDER — COLCHICINE 0.6 MG/1
0.6 TABLET ORAL ONCE
Status: COMPLETED | OUTPATIENT
Start: 2023-10-23 | End: 2023-10-23

## 2023-10-23 RX ORDER — SODIUM CHLORIDE 9 MG/ML
INJECTION, SOLUTION INTRAVENOUS PRN
Status: DISCONTINUED | OUTPATIENT
Start: 2023-10-23 | End: 2023-10-27 | Stop reason: HOSPADM

## 2023-10-23 RX ORDER — FUROSEMIDE 20 MG/1
20 TABLET ORAL DAILY
Status: DISCONTINUED | OUTPATIENT
Start: 2023-10-23 | End: 2023-10-27 | Stop reason: HOSPADM

## 2023-10-23 RX ORDER — ROSUVASTATIN CALCIUM 10 MG/1
20 TABLET, COATED ORAL DAILY
Status: DISCONTINUED | OUTPATIENT
Start: 2023-10-23 | End: 2023-10-27 | Stop reason: HOSPADM

## 2023-10-23 RX ORDER — LIDOCAINE 4 G/G
1 PATCH TOPICAL DAILY
Status: DISCONTINUED | OUTPATIENT
Start: 2023-10-23 | End: 2023-10-27 | Stop reason: HOSPADM

## 2023-10-23 RX ORDER — DEXTROSE MONOHYDRATE 100 MG/ML
INJECTION, SOLUTION INTRAVENOUS CONTINUOUS PRN
Status: DISCONTINUED | OUTPATIENT
Start: 2023-10-23 | End: 2023-10-27 | Stop reason: HOSPADM

## 2023-10-23 RX ORDER — BENZONATATE 100 MG/1
100 CAPSULE ORAL 3 TIMES DAILY PRN
Status: DISCONTINUED | OUTPATIENT
Start: 2023-10-23 | End: 2023-10-27 | Stop reason: HOSPADM

## 2023-10-23 RX ORDER — KETOROLAC TROMETHAMINE 30 MG/ML
15 INJECTION, SOLUTION INTRAMUSCULAR; INTRAVENOUS EVERY 6 HOURS
Status: DISCONTINUED | OUTPATIENT
Start: 2023-10-23 | End: 2023-10-23

## 2023-10-23 RX ORDER — COLCHICINE 0.6 MG/1
0.6 TABLET ORAL DAILY
Status: DISCONTINUED | OUTPATIENT
Start: 2023-10-24 | End: 2023-10-25

## 2023-10-23 RX ORDER — ACETAMINOPHEN 325 MG/1
650 TABLET ORAL EVERY 6 HOURS PRN
Status: DISCONTINUED | OUTPATIENT
Start: 2023-10-23 | End: 2023-10-27 | Stop reason: HOSPADM

## 2023-10-23 RX ORDER — METOPROLOL SUCCINATE 25 MG/1
50 TABLET, EXTENDED RELEASE ORAL DAILY
Status: DISCONTINUED | OUTPATIENT
Start: 2023-10-23 | End: 2023-10-27 | Stop reason: HOSPADM

## 2023-10-23 RX ORDER — LIDOCAINE HYDROCHLORIDE 10 MG/ML
5 INJECTION, SOLUTION EPIDURAL; INFILTRATION; INTRACAUDAL; PERINEURAL ONCE
Status: DISCONTINUED | OUTPATIENT
Start: 2023-10-23 | End: 2023-10-27 | Stop reason: HOSPADM

## 2023-10-23 RX ORDER — COLCHICINE 0.6 MG/1
1.2 TABLET ORAL ONCE
Status: COMPLETED | OUTPATIENT
Start: 2023-10-23 | End: 2023-10-23

## 2023-10-23 RX ADMIN — COLCHICINE 0.6 MG: 0.6 TABLET, FILM COATED ORAL at 17:04

## 2023-10-23 RX ADMIN — BENZONATATE 100 MG: 100 CAPSULE ORAL at 21:39

## 2023-10-23 RX ADMIN — SACUBITRIL AND VALSARTAN 1 TABLET: 49; 51 TABLET, FILM COATED ORAL at 21:39

## 2023-10-23 RX ADMIN — APIXABAN 5 MG: 5 TABLET, FILM COATED ORAL at 09:10

## 2023-10-23 RX ADMIN — TRAMADOL HYDROCHLORIDE 50 MG: 50 TABLET ORAL at 09:14

## 2023-10-23 RX ADMIN — ALLOPURINOL 100 MG: 100 TABLET ORAL at 09:10

## 2023-10-23 RX ADMIN — SACUBITRIL AND VALSARTAN 1 TABLET: 49; 51 TABLET, FILM COATED ORAL at 11:55

## 2023-10-23 RX ADMIN — KETOROLAC TROMETHAMINE 15 MG: 30 INJECTION, SOLUTION INTRAMUSCULAR at 15:40

## 2023-10-23 RX ADMIN — FUROSEMIDE 20 MG: 40 TABLET ORAL at 17:06

## 2023-10-23 RX ADMIN — TRAMADOL HYDROCHLORIDE 50 MG: 50 TABLET ORAL at 21:39

## 2023-10-23 RX ADMIN — ROSUVASTATIN CALCIUM 20 MG: 10 TABLET, FILM COATED ORAL at 09:09

## 2023-10-23 RX ADMIN — SODIUM CHLORIDE, PRESERVATIVE FREE 10 ML: 5 INJECTION INTRAVENOUS at 22:23

## 2023-10-23 RX ADMIN — APIXABAN 5 MG: 5 TABLET, FILM COATED ORAL at 21:30

## 2023-10-23 RX ADMIN — COLCHICINE 1.2 MG: 0.6 TABLET, FILM COATED ORAL at 17:05

## 2023-10-23 RX ADMIN — TRAMADOL HYDROCHLORIDE 50 MG: 50 TABLET ORAL at 15:40

## 2023-10-23 RX ADMIN — METOPROLOL SUCCINATE 50 MG: 50 TABLET, EXTENDED RELEASE ORAL at 09:09

## 2023-10-23 ASSESSMENT — PAIN SCALES - GENERAL
PAINLEVEL_OUTOF10: 9
PAINLEVEL_OUTOF10: 8
PAINLEVEL_OUTOF10: 5
PAINLEVEL_OUTOF10: 9

## 2023-10-23 ASSESSMENT — PAIN DESCRIPTION - DESCRIPTORS
DESCRIPTORS: ACHING

## 2023-10-23 ASSESSMENT — PAIN DESCRIPTION - ORIENTATION
ORIENTATION: RIGHT

## 2023-10-23 ASSESSMENT — LIFESTYLE VARIABLES
HOW OFTEN DO YOU HAVE A DRINK CONTAINING ALCOHOL: MONTHLY OR LESS
HOW MANY STANDARD DRINKS CONTAINING ALCOHOL DO YOU HAVE ON A TYPICAL DAY: 1 OR 2

## 2023-10-23 ASSESSMENT — PAIN DESCRIPTION - LOCATION
LOCATION: KNEE;BACK
LOCATION: KNEE;SHOULDER
LOCATION: KNEE

## 2023-10-23 NOTE — PROCEDURES
Arthrocentesis without Injection Procedural Report    Indications: Symptomatic relief of large effusion and Diagnostic    Preprocedural Diagnosis: * No surgery found *    Postprocedural Diagnosis: * No surgery found *    Provider: CATRINA Mcdonough     Assistant:  * Surgery not found *    Anesthesia:   * No surgery found *    Allergy:   Allergies   Allergen Reactions    Sulfa Antibiotics        Procedure Details: The risks,benefits and alternatives of a joint aspiration were explained and consent was obtained for the procedure. The aspiration is being done for diagnostic purposes. The area was cleansed using Chlorprep. Anesthetic using 1% lidocaine was infiltrated locally. Using a 18 gauge needle 65 mLs of cloudy fluid was obtained. A dressing and ACE wrap was applied. The patient tolerated the procedure well. The fluid was sent to the lab. Orders on Fluid: synovial fluid for cell count; C&S and gram stain; crystal analysis    Estimated Blood Loss:   0    Specimens: * Cannot find log *           Complications:  None; patient tolerated the procedure well.              Signed By: CATRINA Mcdonough

## 2023-10-23 NOTE — ED PROVIDER NOTES
EXTRA TUBES HOLD   EXTRA TUBES HOLD   SYNOVIAL FLUID, CELL COUNT   CRYSTALS, BODY FLUID     ED physician interpretation of laboratory results: Documented in ED course    Imaging Results:  XR KNEE RIGHT (3 VIEWS)   Final Result   Moderate joint effusion        ED physician interpretation of imaging: Documented in ED course    Medications Given:  Medications   lidocaine PF 1 % injection 5 mL (has no administration in time range)   ketorolac (TORADOL) injection 15 mg (15 mg IntraVENous Given 10/22/23 2209)   morphine (PF) injection 8 mg (8 mg IntraVENous Given 10/22/23 2209)   ondansetron (ZOFRAN) injection 4 mg (4 mg IntraVENous Given 10/22/23 2209)       Differential Diagnosis included but not limited to:  Such as:  Fracture, dislocation, foreign body, arterial injury, nerve injury, infection. Medical Decision Making  The patient was placed into an examination in room. Nursing notes were reviewed. The patient was interviewed and an examination was completed by me with the above findings. MDM:    This is a 80-year-old male with history for gout who presents to the ED via EMS for chief complaint of gout located to his right knee. Symptoms were first noted about 1 week ago after patient went drinking. Symptoms are progressive worsening with time. He is on allopurinol. He has tried to ice his knee without alleviation, has not taken any other medications for alleviation of his symptoms. Symptoms are primarily looking to the right knee, but he states he also has gout in his bilateral feet and the right shoulder but this is not significantly painful at this time. He denies have any fevers, nausea, vomiting, diarrhea. On physical exam he does have a large amount of swelling noted to the right knee, and tenderness with palpation. Range of motion of the right knee is limited secondary to pain. This time will obtain lab work, as well as a right knee x-ray.   Patient given Toradol 50 mg IV, morphine 8 Complexity of Data Reviewed  Labs: ordered. Radiology: ordered. Risk  Prescription drug management. Decision regarding hospitalization. Procedures       DISPOSITION: Decision To Admit 10/23/2023 12:10:16 AM    CLINICAL IMPRESSION:   1. Acute gouty arthritis    2. Pain and swelling of right knee    3. History of gout    4. Cough, unspecified type         Further personalized recommendations for outpatient care as below.         Prescriptions provided to the patient:     New Prescriptions    INDOMETHACIN (INDOCIN) 50 MG CAPSULE    Take 1 capsule by mouth 3 times daily (with meals) for 5 days    PROMETHAZINE-DEXTROMETHORPHAN (PROMETHAZINE-DM) 6.25-15 MG/5ML SYRUP    Take 5 mLs by mouth 4 times daily as needed for Cough        Pranav Albrecht DO   Emergency Medicine Attending Physician          Pranav Albrecht DO  10/23/23 0658

## 2023-10-23 NOTE — PLAN OF CARE
Problem: Physical Therapy - Adult  Goal: By Discharge: Performs mobility at highest level of function for planned discharge setting. See evaluation for individualized goals. Description: FUNCTIONAL STATUS PRIOR TO ADMISSION: Patient was independent and active without use of DME.    HOME SUPPORT PRIOR TO ADMISSION: The patient lived with wife but did not require assistance until the last ~1 week. Physical Therapy Goals  Initiated 10/23/2023  1. Patient will move from supine to sit and sit to supine, scoot up and down, and roll side to side in bed with moderate assistance within 7 day(s). 2.  Patient will perform sit to stand with moderate assistance within 7 day(s). 3.  Patient will transfer from bed to chair and chair to bed with moderate assistance using the least restrictive device within 7 day(s). 4.  Patient will ambulate with moderate assistance for 15 feet with the least restrictive device within 7 day(s). 5.  Patient will ascend/descend 5 stairs with bilat handrail(s) with moderate assistance within 7 day(s). Outcome: Progressing  PHYSICAL THERAPY EVALUATION    Patient: Oral Arun Teague. (64 y.o. male)  Date: 10/23/2023  Primary Diagnosis: Right knee pain [M25.561]       Precautions: Fall Risk     ASSESSMENT :   The patient is limited by overall decline in functional mobility s/p admission with R knee pain - now s/p joint aspiration. At baseline, pt lives with his wife and was independent with mobility and ADLs until symptoms started ~1 week ago and he has been largely bedbound with wife assisting him at bedleve since then. This date, pt greatly limited by gout pain in R knee, R shoulder, and bilateral feet. Required maxA x2 for supine<>sit and unable to attempt standing. Pt required significantly increased time for all mobility. He currently presents far below his independent baseline. Discharge recommendation TBD pending pain management - recommending IPR at current functional level.

## 2023-10-23 NOTE — PROGRESS NOTES
by mouth daily    apixaban (ELIQUIS) 5 MG TABS tablet Take 1 tablet by mouth 2 times daily    furosemide (LASIX) 20 MG tablet Take 1 tablet by mouth daily    vitamin D3 (CHOLECALCIFEROL) 10 MCG (400 UNIT) TABS tablet Take 1 tablet by mouth daily    allopurinol (ZYLOPRIM) 100 MG tablet Take 1 tablet by mouth daily     ______________________________________________________________________  EXPECTED LENGTH OF STAY: Unable to retrieve estimated LOS  ACTUAL LENGTH OF STAY:          0                 Vinita Rinne, APRN - NP

## 2023-10-23 NOTE — ED NOTES
Bedside and Verbal shift change report given to Kunal Cruz RN (oncoming nurse) by Maria Elena Duarte RN (offgoing nurse). Report included the following information Nurse Handoff Report, ED SBAR, Adult Overview, and MAR.         Gabe Valladares RN  10/23/23 7917

## 2023-10-23 NOTE — PLAN OF CARE
Problem: Occupational Therapy - Adult  Goal: By Discharge: Performs self-care activities at highest level of function for planned discharge setting. See evaluation for individualized goals. Description: FUNCTIONAL STATUS PRIOR TO ADMISSION:  Patient was independent and active without device, performing all ADLs (to include standing showering). Patient with decline over past week due to pain, requiring spouse assist for ADLs (recently limited to bed level toileting). HOME SUPPORT: Patient lived with his wife. Occupational Therapy Goals:  Initiated 10/23/2023  1. Patient will perform upper body dressing with Supervision/Set-up within 7 day(s). 2.  Patient will perform lower body dressing with Minimal Assist using most appropriate DME / AE prn within 7 day(s). 3.  Patient will perform toilet transfers to [facility] with Moderate Assist using most appropriate DME prn within 7 day(s). 4.  Patient will perform all aspects of toileting with Minimal Assist using most appropriate DME prn within 7 day(s). 5.  Patient will participate in upper extremity therapeutic exercise/activities with Supervision for 7 minutes within 7 day(s). 6.  Patient will utilize energy conservation techniques during functional activities with verbal cues within 7 day(s). Outcome: Progressing   OCCUPATIONAL THERAPY EVALUATION    Patient: Fernando Clemente Sr. (64 y.o. male)  Date: 10/23/2023  Primary Diagnosis: Right knee pain [M25.561]         Precautions: Fall Risk                  ASSESSMENT :  The patient is limited by decreased functional mobility, independence in ADLs, high-level IADLs, ROM, strength, activity tolerance, and increased pain levels. Patient admitted with concerns for possible gout - initially pain focused in R knee but now endorsing pain in B feet and R shoulder as well. This date, patient requires max assist x2 for bed mobility with significant time needed for transitional movements due to elevated pain levels. Limits  Orientation Level: Oriented X4  Cognition  Overall Cognitive Status: WNL    Skin:     Edema:     Hearing:   Hearing  Hearing: Within functional limits    Vision/Perceptual:    Vision  Vision: Within Functional Limits  Perception  Overall Perceptual Status: WFL    Range of Motion:   AROM: Generally decreased, functional    Strength:  Strength: Generally decreased, functional    Coordination:  Coordination: Generally decreased, functional  Coordination: Within functional limits      Tone & Sensation:        Functional Mobility and Transfers for ADLs:    Bed Mobility:  Bed Mobility Training  Supine to Sit: Maximum assistance;Assist X2;Additional time  Sit to Supine: Maximum assistance;Assist X2;Additional time  Scooting: Stand-by assistance; Additional time (seated EOB)    Balance:  Standing:  (unable to stand d/t pain)  Balance  Sitting: Intact  Standing:  (unable to stand d/t pain)    ADL Assessment:     Feeding: Setup       Grooming: Setup (In supported sitting.)       UE Bathing: Minimal assistance       LE Bathing: Maximum assistance       UE Dressing: Minimal assistance       LE Dressing: Maximum assistance       Toileting: Maximum assistance    ADL Intervention and Therapeutic Activity:  Patient participates in functional transfer training in prep for EOB and seated ADL engagement. Patient performs bed mobility with max assist x2 with significant time needed due to elevated pain levels. Patient maintains EOB sitting balance, tolerating BLEs in dependent position several minutes. Patient currently unable to reach distally to lower body due to pain, requiring up to total assist for sock management. Patient reports managing urinal at bed-level from ED stretcher. [address] [facility] AM-PACTM \"6 Clicks\"                                                       Daily Activity Inpatient Short Form  How much help from another person does the patient currently need. .. Total; A Lot A Little None   1.   Putting

## 2023-10-23 NOTE — ED NOTES
Pt. Desaturates to 84% on room air.  Pt. Stephane Acuña on [address] by writer, now maintaining a saturation of 97%     Maia Regan RN  10/23/23 2353

## 2023-10-23 NOTE — DISCHARGE INSTRUCTIONS
Discharge Instructions     PATIENT ID: Lalita Darby Sr. MRN: [id number]   YOB: 1960    DATE OF ADMISSION: 10/22/2023   DATE OF DISCHARGE: 10/26/2023  PRIMARY CARE PROVIDER: Xochitl Khan   ATTENDING PHYSICIAN: Kitty Campo MD   DISCHARGING PROVIDER: KANDI Parada NP      DISCHARGE DIAGNOSES   R Knee pain, possibly Gout  S/p Steroid Injection    CONSULTATIONS:   Orthopedics    PROCEDURES/SURGERIES: * No surgery found *    PENDING TEST RESULTS:   At the time of discharge the following test results are still pending: final knee culture    Follow-up Information       Follow up With Specialties Details Why Contact Info    [facility]  Follow up  [phone]    Xochitl Khan MD Family Medicine Follow up  [address] [facility]  [phone]      Carly Pérez MD Cardiology Follow up as per previously schduled appt [address] [facility] [address]  [address] [phone]            ADDITIONAL CARE RECOMMENDATIONS:   Take medications as directed  May treat pain with ice, tylenol and rest.  In event of severe pain, limited supply of oxycodone has been prescribed    Follow up with [facility] in 3-4 weeks, complete paperwork and send back as soon as able    DIET: cardiac diet    ACTIVITY: activity as tolerated    WOUND CARE: none    EQUIPMENT needed: walker    DISCHARGE MEDICATIONS:   See Medication Reconciliation Form    It is important that you take the medication exactly as they are prescribed. Keep your medication in the bottles provided by the pharmacist and keep a list of the medication names, dosages, and times to be taken in your wallet. Do not take other medications without consulting your doctor. NOTIFY YOUR PHYSICIAN FOR ANY OF THE FOLLOWING:   Fever over 101 degrees for 24 hours. Chest pain, shortness of breath, fever, chills, nausea, vomiting, diarrhea, change in mentation, falling, weakness, bleeding.  Severe pain or pain not relieved by medications. Or, any other signs or symptoms that you may have questions about.     DISPOSITION:   xx Home With:  xx OT xx PT xx HH  RN       SNF/Inpatient Rehab/LTAC    Independent/assisted living    Hospice    Other:     Signed:   KANDI Gonzales NP  10/26/2023  4:52 PM

## 2023-10-24 ENCOUNTER — APPOINTMENT (OUTPATIENT)
Facility: HOSPITAL | Age: 63
End: 2023-10-24
Payer: OTHER GOVERNMENT

## 2023-10-24 LAB
ANION GAP SERPL CALC-SCNC: 7 MMOL/L (ref 5–15)
BASOPHILS # BLD: 0 K/UL (ref 0–0.1)
BASOPHILS NFR BLD: 0 % (ref 0–1)
BUN SERPL-MCNC: 40 MG/DL (ref 6–20)
BUN/CREAT SERPL: 29 (ref 12–20)
CALCIUM SERPL-MCNC: 9.2 MG/DL (ref 8.5–10.1)
CHLORIDE SERPL-SCNC: 102 MMOL/L (ref 97–108)
CO2 SERPL-SCNC: 23 MMOL/L (ref 21–32)
CREAT SERPL-MCNC: 1.4 MG/DL (ref 0.7–1.3)
DIFFERENTIAL METHOD BLD: ABNORMAL
EOSINOPHIL # BLD: 0.2 K/UL (ref 0–0.4)
EOSINOPHIL NFR BLD: 2 % (ref 0–7)
ERYTHROCYTE [DISTWIDTH] IN BLOOD BY AUTOMATED COUNT: 18 % (ref 11.5–14.5)
GLUCOSE BLD STRIP.AUTO-MCNC: 120 MG/DL (ref 65–117)
GLUCOSE BLD STRIP.AUTO-MCNC: 133 MG/DL (ref 65–117)
GLUCOSE BLD STRIP.AUTO-MCNC: 134 MG/DL (ref 65–117)
GLUCOSE BLD STRIP.AUTO-MCNC: 145 MG/DL (ref 65–117)
GLUCOSE SERPL-MCNC: 148 MG/DL (ref 65–100)
HCT VFR BLD AUTO: 42.6 % (ref 36.6–50.3)
HGB BLD-MCNC: 12.5 G/DL (ref 12.1–17)
IMM GRANULOCYTES # BLD AUTO: 0 K/UL (ref 0–0.04)
IMM GRANULOCYTES NFR BLD AUTO: 0 % (ref 0–0.5)
LYMPHOCYTES # BLD: 1.1 K/UL (ref 0.8–3.5)
LYMPHOCYTES NFR BLD: 11 % (ref 12–49)
MCH RBC QN AUTO: 22.4 PG (ref 26–34)
MCHC RBC AUTO-ENTMCNC: 29.3 G/DL (ref 30–36.5)
MCV RBC AUTO: 76.5 FL (ref 80–99)
MONOCYTES # BLD: 1.2 K/UL (ref 0–1)
MONOCYTES NFR BLD: 12 % (ref 5–13)
NEUTS SEG # BLD: 7.3 K/UL (ref 1.8–8)
NEUTS SEG NFR BLD: 75 % (ref 32–75)
NRBC # BLD: 0 K/UL (ref 0–0.01)
NRBC BLD-RTO: 0 PER 100 WBC
PLATELET # BLD AUTO: 286 K/UL (ref 150–400)
PMV BLD AUTO: 9.3 FL (ref 8.9–12.9)
POTASSIUM SERPL-SCNC: 4.2 MMOL/L (ref 3.5–5.1)
RBC # BLD AUTO: 5.57 M/UL (ref 4.1–5.7)
SERVICE CMNT-IMP: ABNORMAL
SODIUM SERPL-SCNC: 132 MMOL/L (ref 136–145)
WBC # BLD AUTO: 9.8 K/UL (ref 4.1–11.1)

## 2023-10-24 PROCEDURE — 94760 N-INVAS EAR/PLS OXIMETRY 1: CPT

## 2023-10-24 PROCEDURE — 6360000004 HC RX CONTRAST MEDICATION: Performed by: RADIOLOGY

## 2023-10-24 PROCEDURE — 71275 CT ANGIOGRAPHY CHEST: CPT

## 2023-10-24 PROCEDURE — 1100000000 HC RM PRIVATE

## 2023-10-24 PROCEDURE — 2700000000 HC OXYGEN THERAPY PER DAY

## 2023-10-24 PROCEDURE — 2580000003 HC RX 258: Performed by: FAMILY MEDICINE

## 2023-10-24 PROCEDURE — 93970 EXTREMITY STUDY: CPT

## 2023-10-24 PROCEDURE — 97535 SELF CARE MNGMENT TRAINING: CPT

## 2023-10-24 PROCEDURE — 97530 THERAPEUTIC ACTIVITIES: CPT

## 2023-10-24 PROCEDURE — 82962 GLUCOSE BLOOD TEST: CPT

## 2023-10-24 PROCEDURE — 85025 COMPLETE CBC W/AUTO DIFF WBC: CPT

## 2023-10-24 PROCEDURE — 36415 COLL VENOUS BLD VENIPUNCTURE: CPT

## 2023-10-24 PROCEDURE — 6370000000 HC RX 637 (ALT 250 FOR IP)

## 2023-10-24 PROCEDURE — 6370000000 HC RX 637 (ALT 250 FOR IP): Performed by: FAMILY MEDICINE

## 2023-10-24 PROCEDURE — 80048 BASIC METABOLIC PNL TOTAL CA: CPT

## 2023-10-24 RX ORDER — ACETAMINOPHEN 325 MG/1
650 TABLET ORAL EVERY 8 HOURS SCHEDULED
Status: DISCONTINUED | OUTPATIENT
Start: 2023-10-24 | End: 2023-10-27 | Stop reason: HOSPADM

## 2023-10-24 RX ORDER — TRAMADOL HYDROCHLORIDE 50 MG/1
50 TABLET ORAL EVERY 6 HOURS PRN
Status: DISCONTINUED | OUTPATIENT
Start: 2023-10-24 | End: 2023-10-26

## 2023-10-24 RX ORDER — OXYCODONE HYDROCHLORIDE 5 MG/1
5 TABLET ORAL EVERY 4 HOURS PRN
Status: DISCONTINUED | OUTPATIENT
Start: 2023-10-24 | End: 2023-10-27 | Stop reason: HOSPADM

## 2023-10-24 RX ADMIN — ROSUVASTATIN CALCIUM 20 MG: 10 TABLET, FILM COATED ORAL at 09:27

## 2023-10-24 RX ADMIN — SACUBITRIL AND VALSARTAN 1 TABLET: 49; 51 TABLET, FILM COATED ORAL at 09:28

## 2023-10-24 RX ADMIN — ACETAMINOPHEN 650 MG: 325 TABLET ORAL at 16:14

## 2023-10-24 RX ADMIN — IOPAMIDOL 100 ML: 755 INJECTION, SOLUTION INTRAVENOUS at 12:31

## 2023-10-24 RX ADMIN — BENZONATATE 100 MG: 100 CAPSULE ORAL at 06:40

## 2023-10-24 RX ADMIN — ACETAMINOPHEN 650 MG: 325 TABLET ORAL at 23:36

## 2023-10-24 RX ADMIN — TRAMADOL HYDROCHLORIDE 50 MG: 50 TABLET ORAL at 02:22

## 2023-10-24 RX ADMIN — SODIUM CHLORIDE, PRESERVATIVE FREE 10 ML: 5 INJECTION INTRAVENOUS at 21:23

## 2023-10-24 RX ADMIN — APIXABAN 5 MG: 5 TABLET, FILM COATED ORAL at 09:27

## 2023-10-24 RX ADMIN — COLCHICINE 0.6 MG: 0.6 TABLET, FILM COATED ORAL at 09:27

## 2023-10-24 RX ADMIN — BENZONATATE 100 MG: 100 CAPSULE ORAL at 19:38

## 2023-10-24 RX ADMIN — SACUBITRIL AND VALSARTAN 1 TABLET: 49; 51 TABLET, FILM COATED ORAL at 21:42

## 2023-10-24 RX ADMIN — TRAMADOL HYDROCHLORIDE 50 MG: 50 TABLET ORAL at 09:40

## 2023-10-24 RX ADMIN — APIXABAN 5 MG: 5 TABLET, FILM COATED ORAL at 21:19

## 2023-10-24 RX ADMIN — METOPROLOL SUCCINATE 50 MG: 50 TABLET, EXTENDED RELEASE ORAL at 09:27

## 2023-10-24 RX ADMIN — ALLOPURINOL 100 MG: 100 TABLET ORAL at 09:27

## 2023-10-24 RX ADMIN — OXYCODONE HYDROCHLORIDE 5 MG: 5 TABLET ORAL at 19:38

## 2023-10-24 RX ADMIN — OXYCODONE HYDROCHLORIDE 5 MG: 5 TABLET ORAL at 15:25

## 2023-10-24 ASSESSMENT — PAIN DESCRIPTION - DESCRIPTORS
DESCRIPTORS: ACHING
DESCRIPTORS: ACHING;THROBBING
DESCRIPTORS: ACHING;THROBBING

## 2023-10-24 ASSESSMENT — PAIN DESCRIPTION - LOCATION
LOCATION: KNEE;SHOULDER;FOOT
LOCATION: KNEE;SHOULDER;FOOT
LOCATION: KNEE
LOCATION: KNEE;FOOT

## 2023-10-24 ASSESSMENT — PAIN SCALES - GENERAL
PAINLEVEL_OUTOF10: 5
PAINLEVEL_OUTOF10: 2
PAINLEVEL_OUTOF10: 5
PAINLEVEL_OUTOF10: 10
PAINLEVEL_OUTOF10: 5
PAINLEVEL_OUTOF10: 9

## 2023-10-24 ASSESSMENT — PAIN - FUNCTIONAL ASSESSMENT
PAIN_FUNCTIONAL_ASSESSMENT: ACTIVITIES ARE NOT PREVENTED
PAIN_FUNCTIONAL_ASSESSMENT: ACTIVITIES ARE NOT PREVENTED

## 2023-10-24 ASSESSMENT — PAIN DESCRIPTION - ORIENTATION
ORIENTATION: RIGHT

## 2023-10-24 NOTE — ED NOTES
Bedside and Verbal shift change report given to [address] (oncoming nurse) by Ivette Rowley (offgoing nurse). Report included the following information Nurse Handoff Report, Index, ED Encounter Summary, ED SBAR, Adult Overview, Intake/Output, MAR, and Recent Results.         Marlon Collins RN  10/23/23 2023

## 2023-10-24 NOTE — RT PROTOCOL NOTE
Took CPAP in the room, pt did not want to use it, states he wanted to look at his other options for TOMMY.  PT is on NC at 3L

## 2023-10-24 NOTE — PROGRESS NOTES
12:16 Chart reviewed in prep for PT treatment - pt off floor for CT scan of chest - requires oxygen to maintain Spo2> 92% - also noted with D-Dimer elevated 1.12 mg/L FEU. Will f/u this pm as able -   Astrid Woodward, PT    Addendum:  12:46 - pt returned from chest CT - pt declined OOB to chair for lunch or attempting standing secondary to right knee and shoulder pain - rates 0/10 at rest and 10/10 with movement. Pt stated moving on/off stretcher was very painful. Continue attempts to mobilize.       Astrid Woodward, PT

## 2023-10-24 NOTE — PROGRESS NOTES
A Spiritual Care Partner Volunteer visited Prasad Llanos at [facility] in [address] [place] [address] on 10/24/2023     Documented by: Ollie Forbes

## 2023-10-24 NOTE — PROGRESS NOTES
Physician Progress Note      [facility]:                  [id number]  :                       1960  ADMIT DATE:       10/22/2023 8:48 PM  DISCH DATE:  RESPONDING  PROVIDER #:        Lynnette Abraham          QUERY TEXT:    Patient admitted with Gout, noted to have atrial fibrillation and is   maintained on Eliquis. If possible, please document in progress notes and   discharge summary if you are evaluating and/or treating any of the following: The medical record reflects the following:  Risk Factors: HTN, DM  Clinical Indicators: H&P 10/23 \"hx atrial fibrillation--baseline EKG\"  Treatment: apixaban (ELIQUIS) tablet 5 mg, metoprolol succinate (TOPROL XL)   extended release tablet 50 mg    Thank you,  Tyler Bhardwaj RN, CCDS, [facility]  Certified Clinical Documentation   [phone]  Options provided:  -- Secondary hypercoagulable state in a patient with atrial fibrillation  -- Other - I will add my own diagnosis  -- Disagree - Not applicable / Not valid  -- Disagree - Clinically unable to determine / Unknown  -- Refer to Clinical Documentation Reviewer    PROVIDER RESPONSE TEXT:    This patient has secondary hypercoagulable state in a patient with atrial   fibrillation. Query created by: Nedra Manzano on 10/24/2023 10:37 AM      QUERY TEXT:    Patient admitted with Gout, noted to have atrial fibrillation. If possible,   please document in progress notes and discharge summary further specificity   regarding the type of atrial fibrillation:     The medical record reflects the following:  Risk Factors: HTN, DM  Clinical Indicators: H&P 10/23 \"hx atrial fibrillation-baseline EKG\"  Treatment: apixaban (ELIQUIS) tablet 5 mg, metoprolol succinate (TOPROL XL)   extended release tablet 50 mg    Thank you,  Tyler Bhardwaj RN, CCDS, [facility]  Certified Clinical Documentation   [phone]  Options provided:  -- Paroxysmal Atrial

## 2023-10-24 NOTE — PROGRESS NOTES
[address] [facility]  Hospitalist Group                                                                                          Hospitalist Progress Note  KANDI Chong - NP  Answering service: [phone] from in house phone        Date of Service:  10/24/2023  NAME:  Rowena Mckeon Sr.  :  1960  MRN:  [id number]       Admission Summary:   Aris Mejia is a 61 y.o. male with a pmhx gout, DM II, a-fib, HTN, sleep apnea, and morbid obesity who presents with right knee pain and inability to ambulate. His knee pain after started after consuming large amount of alcohol. He denies fever, chills, nausea, or vomiting. In the ED, BP was elevated to 182/105, he was also intermittently hypoxic during sleep not on cpap. Lab showed Na 133, T bili 2.6, and albumin 2.7. XR right knee showed moderate joint effusion. Ortho was consulted and aspirated the joint sending the fluids for study. Interval history / Subjective:   Patient seen and examined, reports ongoing right knee pain. Notes no change in pain levels since yesterday. He has been unable to bear weight on this leg due to pain. Ortho following - patient to possibly receive steroid injection if final knee aspirate culture negative.      Assessment & Plan:     Right knee effusion, likely gout flare  -s/p drainage by ortho: fluid positive for uric acid crystals  -fluid culture pending  -Ortho following: consider possible steroid injection if final culture negative  -continue home allopurinol, colchicine started 10/23 for gout flare  -PT following  -continue lidocaine patch  -start scheduled Tylenol  -add PRN oxycodone for severe pain, continue PRN Tramadol for moderate pain    Dry cough  Hypoxia  Elevated D-dimer  -CXR with no acute issues  -WBC 9.8, patient afebrile  -PRN benzonatate  -wean oxygen as able to maintain Spo2 >92% -- patient remains on 2 L NC, will place on continuous pulse oximetry and continue to

## 2023-10-24 NOTE — CARE COORDINATION
Care Management Initial Assessment       RUR:14%  Readmission? No  1st IM letter given? No  1st  letter given: Yes        10/24/23 0955   Service Assessment   Patient Orientation Alert and Oriented;Person;Place;Situation;Self   Cognition Alert   History Provided By Patient   Primary Caregiver Self   Support Systems Spouse/Significant Other   PCP Verified by CM Yes   Prior Functional Level Independent in ADLs/IADLs   Current Functional Level Assistance with the following:;Bathing;Dressing; Toileting;Mobility;Cooking; Shopping   Can patient return to prior living arrangement Unknown at present   Ability to make needs known: Good   Family able to assist with home care needs: Yes   Would you like for me to discuss the discharge plan with any other family members/significant others, and if so, who? No   Financial Resources None     CM met with pt to introduce him to the role of CM and transition of care. This pt lives with his wife in a 2 story home with 4 steps to enter. Per pt, his bedroom and bathroom are on the second floor, so he has to be able to negotiate those steps. Pt was independent with all of his ADL's and IADL's. He does not own any DME. CM will follow for d/c needs.  Lucio Howard

## 2023-10-24 NOTE — ED NOTES
Assumed care of patient. VSS, NAD noted at this time. Patient comfortable, denies needs at this time. Call bell within reach.         Wil So, RN  10/23/23 7679

## 2023-10-25 PROBLEM — M10.9 ACUTE GOUT OF RIGHT KNEE: Status: ACTIVE | Noted: 2023-10-25

## 2023-10-25 LAB
ANION GAP SERPL CALC-SCNC: 8 MMOL/L (ref 5–15)
BASOPHILS # BLD: 0.1 K/UL (ref 0–0.1)
BASOPHILS NFR BLD: 1 % (ref 0–1)
BUN SERPL-MCNC: 38 MG/DL (ref 6–20)
BUN/CREAT SERPL: 31 (ref 12–20)
CALCIUM SERPL-MCNC: 8.8 MG/DL (ref 8.5–10.1)
CHLORIDE SERPL-SCNC: 105 MMOL/L (ref 97–108)
CO2 SERPL-SCNC: 22 MMOL/L (ref 21–32)
CREAT SERPL-MCNC: 1.24 MG/DL (ref 0.7–1.3)
DIFFERENTIAL METHOD BLD: ABNORMAL
EOSINOPHIL # BLD: 0.3 K/UL (ref 0–0.4)
EOSINOPHIL NFR BLD: 4 % (ref 0–7)
ERYTHROCYTE [DISTWIDTH] IN BLOOD BY AUTOMATED COUNT: 16.9 % (ref 11.5–14.5)
GLUCOSE BLD STRIP.AUTO-MCNC: 109 MG/DL (ref 65–117)
GLUCOSE BLD STRIP.AUTO-MCNC: 113 MG/DL (ref 65–117)
GLUCOSE BLD STRIP.AUTO-MCNC: 138 MG/DL (ref 65–117)
GLUCOSE BLD STRIP.AUTO-MCNC: 156 MG/DL (ref 65–117)
GLUCOSE SERPL-MCNC: 120 MG/DL (ref 65–100)
HCT VFR BLD AUTO: 42.7 % (ref 36.6–50.3)
HGB BLD-MCNC: 12.4 G/DL (ref 12.1–17)
IMM GRANULOCYTES # BLD AUTO: 0 K/UL (ref 0–0.04)
IMM GRANULOCYTES NFR BLD AUTO: 0 % (ref 0–0.5)
LYMPHOCYTES # BLD: 1.3 K/UL (ref 0.8–3.5)
LYMPHOCYTES NFR BLD: 18 % (ref 12–49)
MCH RBC QN AUTO: 22.5 PG (ref 26–34)
MCHC RBC AUTO-ENTMCNC: 29 G/DL (ref 30–36.5)
MCV RBC AUTO: 77.4 FL (ref 80–99)
MONOCYTES # BLD: 1 K/UL (ref 0–1)
MONOCYTES NFR BLD: 13 % (ref 5–13)
NEUTS SEG # BLD: 4.9 K/UL (ref 1.8–8)
NEUTS SEG NFR BLD: 64 % (ref 32–75)
NRBC # BLD: 0 K/UL (ref 0–0.01)
NRBC BLD-RTO: 0 PER 100 WBC
PLATELET # BLD AUTO: 275 K/UL (ref 150–400)
PMV BLD AUTO: 9.4 FL (ref 8.9–12.9)
POTASSIUM SERPL-SCNC: 4 MMOL/L (ref 3.5–5.1)
RBC # BLD AUTO: 5.52 M/UL (ref 4.1–5.7)
SERVICE CMNT-IMP: ABNORMAL
SERVICE CMNT-IMP: ABNORMAL
SERVICE CMNT-IMP: NORMAL
SERVICE CMNT-IMP: NORMAL
SODIUM SERPL-SCNC: 135 MMOL/L (ref 136–145)
WBC # BLD AUTO: 7.5 K/UL (ref 4.1–11.1)

## 2023-10-25 PROCEDURE — 99232 SBSQ HOSP IP/OBS MODERATE 35: CPT | Performed by: PHYSICIAN ASSISTANT

## 2023-10-25 PROCEDURE — 2580000003 HC RX 258: Performed by: FAMILY MEDICINE

## 2023-10-25 PROCEDURE — 85025 COMPLETE CBC W/AUTO DIFF WBC: CPT

## 2023-10-25 PROCEDURE — 82962 GLUCOSE BLOOD TEST: CPT

## 2023-10-25 PROCEDURE — 20610 DRAIN/INJ JOINT/BURSA W/O US: CPT | Performed by: PHYSICIAN ASSISTANT

## 2023-10-25 PROCEDURE — 94760 N-INVAS EAR/PLS OXIMETRY 1: CPT

## 2023-10-25 PROCEDURE — 6370000000 HC RX 637 (ALT 250 FOR IP)

## 2023-10-25 PROCEDURE — 80048 BASIC METABOLIC PNL TOTAL CA: CPT

## 2023-10-25 PROCEDURE — 6370000000 HC RX 637 (ALT 250 FOR IP): Performed by: FAMILY MEDICINE

## 2023-10-25 PROCEDURE — 36415 COLL VENOUS BLD VENIPUNCTURE: CPT

## 2023-10-25 PROCEDURE — 2500000003 HC RX 250 WO HCPCS: Performed by: PHYSICIAN ASSISTANT

## 2023-10-25 PROCEDURE — 97530 THERAPEUTIC ACTIVITIES: CPT

## 2023-10-25 PROCEDURE — 6360000002 HC RX W HCPCS: Performed by: PHYSICIAN ASSISTANT

## 2023-10-25 PROCEDURE — 97535 SELF CARE MNGMENT TRAINING: CPT

## 2023-10-25 PROCEDURE — 6370000000 HC RX 637 (ALT 250 FOR IP): Performed by: NURSE PRACTITIONER

## 2023-10-25 PROCEDURE — 2700000000 HC OXYGEN THERAPY PER DAY

## 2023-10-25 PROCEDURE — 1100000000 HC RM PRIVATE

## 2023-10-25 RX ORDER — LIDOCAINE HYDROCHLORIDE 10 MG/ML
5 INJECTION, SOLUTION EPIDURAL; INFILTRATION; INTRACAUDAL; PERINEURAL ONCE
Status: COMPLETED | OUTPATIENT
Start: 2023-10-25 | End: 2023-10-25

## 2023-10-25 RX ORDER — METHYLPREDNISOLONE ACETATE 80 MG/ML
80 INJECTION, SUSPENSION INTRA-ARTICULAR; INTRALESIONAL; INTRAMUSCULAR; SOFT TISSUE ONCE
Status: COMPLETED | OUTPATIENT
Start: 2023-10-25 | End: 2023-10-25

## 2023-10-25 RX ORDER — COLCHICINE 0.6 MG/1
0.6 TABLET ORAL DAILY PRN
Status: DISCONTINUED | OUTPATIENT
Start: 2023-10-25 | End: 2023-10-27 | Stop reason: HOSPADM

## 2023-10-25 RX ORDER — FLUTICASONE PROPIONATE 50 MCG
1 SPRAY, SUSPENSION (ML) NASAL DAILY
Status: DISCONTINUED | OUTPATIENT
Start: 2023-10-25 | End: 2023-10-27 | Stop reason: HOSPADM

## 2023-10-25 RX ADMIN — OXYCODONE HYDROCHLORIDE 5 MG: 5 TABLET ORAL at 01:06

## 2023-10-25 RX ADMIN — ROSUVASTATIN CALCIUM 20 MG: 10 TABLET, FILM COATED ORAL at 09:48

## 2023-10-25 RX ADMIN — LIDOCAINE HYDROCHLORIDE 5 ML: 10 INJECTION, SOLUTION EPIDURAL; INFILTRATION; INTRACAUDAL; PERINEURAL at 11:10

## 2023-10-25 RX ADMIN — APIXABAN 5 MG: 5 TABLET, FILM COATED ORAL at 09:48

## 2023-10-25 RX ADMIN — METHYLPREDNISOLONE ACETATE 80 MG: 80 INJECTION, SUSPENSION INTRA-ARTICULAR; INTRALESIONAL; INTRAMUSCULAR; SOFT TISSUE at 11:11

## 2023-10-25 RX ADMIN — METOPROLOL SUCCINATE 50 MG: 50 TABLET, EXTENDED RELEASE ORAL at 09:52

## 2023-10-25 RX ADMIN — ALLOPURINOL 100 MG: 100 TABLET ORAL at 09:47

## 2023-10-25 RX ADMIN — OXYCODONE HYDROCHLORIDE 5 MG: 5 TABLET ORAL at 09:52

## 2023-10-25 RX ADMIN — SACUBITRIL AND VALSARTAN 1 TABLET: 49; 51 TABLET, FILM COATED ORAL at 09:52

## 2023-10-25 RX ADMIN — COLCHICINE 0.6 MG: 0.6 TABLET, FILM COATED ORAL at 09:48

## 2023-10-25 RX ADMIN — SACUBITRIL AND VALSARTAN 1 TABLET: 49; 51 TABLET, FILM COATED ORAL at 20:38

## 2023-10-25 RX ADMIN — APIXABAN 5 MG: 5 TABLET, FILM COATED ORAL at 20:38

## 2023-10-25 RX ADMIN — OXYCODONE HYDROCHLORIDE 5 MG: 5 TABLET ORAL at 05:40

## 2023-10-25 RX ADMIN — OXYCODONE HYDROCHLORIDE 5 MG: 5 TABLET ORAL at 20:38

## 2023-10-25 RX ADMIN — BENZONATATE 100 MG: 100 CAPSULE ORAL at 15:38

## 2023-10-25 RX ADMIN — ACETAMINOPHEN 650 MG: 325 TABLET ORAL at 15:34

## 2023-10-25 RX ADMIN — ACETAMINOPHEN 650 MG: 325 TABLET ORAL at 23:28

## 2023-10-25 RX ADMIN — BENZONATATE 100 MG: 100 CAPSULE ORAL at 05:40

## 2023-10-25 RX ADMIN — ACETAMINOPHEN 650 MG: 325 TABLET ORAL at 06:31

## 2023-10-25 RX ADMIN — SODIUM CHLORIDE, PRESERVATIVE FREE 10 ML: 5 INJECTION INTRAVENOUS at 09:52

## 2023-10-25 RX ADMIN — FLUTICASONE PROPIONATE 1 SPRAY: 50 SPRAY, METERED NASAL at 12:40

## 2023-10-25 RX ADMIN — OXYCODONE HYDROCHLORIDE 5 MG: 5 TABLET ORAL at 15:35

## 2023-10-25 RX ADMIN — BENZONATATE 100 MG: 100 CAPSULE ORAL at 23:28

## 2023-10-25 ASSESSMENT — PAIN DESCRIPTION - LOCATION
LOCATION: KNEE

## 2023-10-25 ASSESSMENT — PAIN SCALES - GENERAL
PAINLEVEL_OUTOF10: 3
PAINLEVEL_OUTOF10: 4
PAINLEVEL_OUTOF10: 7
PAINLEVEL_OUTOF10: 3
PAINLEVEL_OUTOF10: 2
PAINLEVEL_OUTOF10: 7
PAINLEVEL_OUTOF10: 2
PAINLEVEL_OUTOF10: 6
PAINLEVEL_OUTOF10: 7
PAINLEVEL_OUTOF10: 7

## 2023-10-25 ASSESSMENT — PAIN DESCRIPTION - DESCRIPTORS
DESCRIPTORS: ACHING
DESCRIPTORS: ACHING
DESCRIPTORS: ACHING;SORE
DESCRIPTORS: ACHING;SORE
DESCRIPTORS: ACHING

## 2023-10-25 ASSESSMENT — PAIN DESCRIPTION - ORIENTATION
ORIENTATION: RIGHT

## 2023-10-25 NOTE — PLAN OF CARE
Problem: Occupational Therapy - Adult  Goal: By Discharge: Performs self-care activities at highest level of function for planned discharge setting. See evaluation for individualized goals. Description: FUNCTIONAL STATUS PRIOR TO ADMISSION:  Patient was independent and active without device, performing all ADLs (to include standing showering). Patient with decline over past week due to pain, requiring spouse assist for ADLs (recently limited to bed level toileting). HOME SUPPORT: Patient lived with his wife. Occupational Therapy Goals:  Initiated 10/23/2023  1. Patient will perform upper body dressing with Supervision/Set-up within 7 day(s). 2.  Patient will perform lower body dressing with Minimal Assist using most appropriate DME / AE prn within 7 day(s). 3.  Patient will perform toilet transfers to [facility] with Moderate Assist using most appropriate DME prn within 7 day(s). 4.  Patient will perform all aspects of toileting with Minimal Assist using most appropriate DME prn within 7 day(s). 5.  Patient will participate in upper extremity therapeutic exercise/activities with Supervision for 7 minutes within 7 day(s). 6.  Patient will utilize energy conservation techniques during functional activities with verbal cues within 7 day(s).     10/24/2023 1522 by Courtney Arango  Outcome: Progressing     Problem: Discharge Planning  Goal: Discharge to home or other facility with appropriate resources  Outcome: Progressing     Problem: Safety - Adult  Goal: Free from fall injury  Outcome: Progressing     Problem: Chronic Conditions and Co-morbidities  Goal: Patient's chronic conditions and co-morbidity symptoms are monitored and maintained or improved  Outcome: Progressing

## 2023-10-25 NOTE — PLAN OF CARE
Problem: Discharge Planning  Goal: Discharge to home or other facility with appropriate resources  10/25/2023 0250 by Mino Vazquez LPN  Outcome: Progressing  10/24/2023 2144 by Braulio Condon RN  Outcome: Progressing     Problem: Safety - Adult  Goal: Free from fall injury  10/25/2023 1008 by Oniel Gandhi RN  Outcome: Progressing  10/25/2023 0250 by Mino Vazquez LPN  Outcome: Progressing  10/24/2023 2144 by Braulio Condon RN  Outcome: Progressing     Problem: Chronic Conditions and Co-morbidities  Goal: Patient's chronic conditions and co-morbidity symptoms are monitored and maintained or improved  10/25/2023 0250 by Mino Vazquez LPN  Outcome: Progressing  10/24/2023 2144 by Braulio Condon RN  Outcome: Progressing     Problem: Pain  Goal: Verbalizes/displays adequate comfort level or baseline comfort level  Outcome: Progressing

## 2023-10-25 NOTE — PROCEDURES
ORTHO PROCEDURE:  Joint Injection  Patient presenting with painful swollen right knee. Aspiration performed on 10/23 with labs consistent with acute gout flare. Have confirmed that there are no organisms noted in the sample and no growth to date with the microbiology department. As such patient is indicated for a an intra-articular injection to help relieve ongoing symptoms. Risks and benefits of this procedure were discussed with the patient who verbalizes understanding and wishes to proceed. Patient's right knee was placed in a comfortable extended position and cleansed with chlorhexidine. He was then injected with 1 cc of 80 mg/cc Depo-Medrol as well as 5 cc of 1% lidocaine. Patient tolerated procedure well.     Mc Saeed

## 2023-10-25 NOTE — PROGRESS NOTES
PHYSICAL THERAPY    Attempted to see for PT mobility after he had been medicated with Tylenol and Oxycodone (30 minutes ago). Patient deferred PT, stating \"I already got up with 2 therapists today (OT) and I'm not doing it again. I just got comfortable. Will f/u tomorrow.     Huy Hedrick

## 2023-10-25 NOTE — PLAN OF CARE
Problem: Occupational Therapy - Adult  Goal: By Discharge: Performs self-care activities at highest level of function for planned discharge setting. See evaluation for individualized goals. Description: FUNCTIONAL STATUS PRIOR TO ADMISSION:  Patient was independent and active without device, performing all ADLs (to include standing showering). Patient with decline over past week due to pain, requiring spouse assist for ADLs (recently limited to bed level toileting). HOME SUPPORT: Patient lived with his wife. Occupational Therapy Goals:  Initiated 10/23/2023  1. Patient will perform upper body dressing with Supervision/Set-up within 7 day(s). 2.  Patient will perform lower body dressing with Minimal Assist using most appropriate DME / AE prn within 7 day(s). 3.  Patient will perform toilet transfers to [facility] with Moderate Assist using most appropriate DME prn within 7 day(s). 4.  Patient will perform all aspects of toileting with Minimal Assist using most appropriate DME prn within 7 day(s). 5.  Patient will participate in upper extremity therapeutic exercise/activities with Supervision for 7 minutes within 7 day(s). 6.  Patient will utilize energy conservation techniques during functional activities with verbal cues within 7 day(s).     10/24/2023 1522 by Leora Rome  Outcome: Progressing     Problem: Discharge Planning  Goal: Discharge to home or other facility with appropriate resources  10/25/2023 0250 by Dayron Rodriguez LPN  Outcome: Progressing  10/24/2023 2144 by Sally Portillo RN  Outcome: Progressing     Problem: Safety - Adult  Goal: Free from fall injury  10/25/2023 0250 by Dayron Rodriguez LPN  Outcome: Progressing  10/24/2023 2144 by Sally Portillo RN  Outcome: Progressing     Problem: Chronic Conditions and Co-morbidities  Goal: Patient's chronic conditions and co-morbidity symptoms are monitored and maintained or improved  10/25/2023 0250 by Dayron Rodriguez LPN  Outcome: Progressing  10/24/2023 2144 by Daren Munoz RN  Outcome: Progressing

## 2023-10-25 NOTE — PROGRESS NOTES
[facility]  Hospitalist Group                                                                                Hospitalist Progress Note  KANDI Hough - NP        Date of Service:  10/25/2023  NAME:  Hussain Elmore Sr.  :  1960  MRN:  [id number]       Admission Summary:     Mr. Zay Leger is a 61 y.o. male with a pmhx gout, DM II, a-fib, HTN, sleep apnea, and morbid obesity who presents with right knee pain and inability to ambulate. His knee pain after started after consuming large amount of alcohol. Denied fever, chills, nausea, or vomiting. In the ED, BP was elevated to 182/105, he was also intermittently hypoxic during sleep, not on cpap. XR right knee showed moderate joint effusion. Ortho was consulted and aspirated the joint sending the fluids for study. Interval history / Subjective:        Pt was seen and examined today post steroid injection to R knee. Has not yet attempted to work with therapy. Addendum: OT worked with pt this afternoon, recommending IPR.  Consult placed to physiatry for eval.      Assessment & Plan:     Right knee effusion, likely gout flare  -s/p drainage by ortho: fluid positive for uric acid crystals  -fluid culture pending - ortho called micro, no growth through today  -Ortho following: gave steroid injection today  -continue home allopurinol, make colchicine prn  -PT/OT  -continue lidocaine patch  -scheduled Tylenol  -PRN oxycodone for severe pain, continue PRN Tramadol for moderate pain    Dry cough  Hypoxia  Elevated D-dimer  -CXR with no acute issues  -WBC 9.8, patient afebrile  -PRN benzonatate  -wean oxygen as able to maintain Spo2 >92% -- sats 94% on RA today  -D-dimer (10/23): 1.12  -chest CTA (10/24): no acute PE, moderate cardiomegaly and pulmonary artery hypertension  -bilateral venous duplex US (10/24): no DVT  -encouraged incentive spirometry  - add flonase for possible post nasal drip related

## 2023-10-25 NOTE — PLAN OF CARE
Problem: Occupational Therapy - Adult  Goal: By Discharge: Performs self-care activities at highest level of function for planned discharge setting. See evaluation for individualized goals. Description: FUNCTIONAL STATUS PRIOR TO ADMISSION:  Patient was independent and active without device, performing all ADLs (to include standing showering). Patient with decline over past week due to pain, requiring spouse assist for ADLs (recently limited to bed level toileting). HOME SUPPORT: Patient lived with his wife. Occupational Therapy Goals:  Initiated 10/23/2023  1. Patient will perform upper body dressing with Supervision/Set-up within 7 day(s). 2.  Patient will perform lower body dressing with Minimal Assist using most appropriate DME / AE prn within 7 day(s). 3.  Patient will perform toilet transfers to [facility] with Moderate Assist using most appropriate DME prn within 7 day(s). 4.  Patient will perform all aspects of toileting with Minimal Assist using most appropriate DME prn within 7 day(s). 5.  Patient will participate in upper extremity therapeutic exercise/activities with Supervision for 7 minutes within 7 day(s). 6.  Patient will utilize energy conservation techniques during functional activities with verbal cues within 7 day(s). Outcome: Progressing   OCCUPATIONAL THERAPY TREATMENT  Patient: Lidia Elizabeth Sr. (64 y.o. male)  Date: 10/25/2023  Primary Diagnosis: Acute gouty arthritis [M10.9]  History of gout [Z87.39]  Right knee pain [M25.561]  Pain and swelling of right knee [M25.561, M25.461]  Cough, unspecified type [R05.9]       Precautions: Fall Risk                Chart, occupational therapy assessment, plan of care, and goals were reviewed. ASSESSMENT  Patient continues to benefit from skilled OT services and is progressing towards goals.  Patient presents this session with decreased RLE pain following steroid injection earlier in day, improved OOB activity, limited ability for side Status:  Orientation  Overall Orientation Status: Within Normal Limits  Orientation Level: Oriented X4  Cognition  Overall Cognitive Status: WNL    Functional Mobility and Transfers for ADLs:  Bed Mobility:  Bed Mobility Training  Bed Mobility Training: Yes  Supine to Sit: Moderate assistance;Assist X2  Sit to Supine: Moderate assistance;Assist X1  Scooting: Minimum assistance     Transfers:   Transfer Training  Transfer Training: Yes  Sit to Stand: Moderate assistance;Maximum assistance;Assist X2  Stand to Sit: Moderate assistance;Assist X2      Balance:  Standing: Impaired  Balance  Sitting: Intact  Standing: Impaired  Standing - Static: Fair;Constant support  Standing - Dynamic: Poor;Constant support      ADL Intervention:        Grooming: . - Face Washing : Set-up Assistance and Seated EOB  - Oral Care: Set-up Assistance and Seated EOB  - Applying Deodorant/Lotion : Set-up Assistance and Seated EOB    Toileting: . - Bowel Hygiene : Total Assistance and Standing (wife assisted per request)    Upper Extremity Dressing: . - Pull Over Shirt : Minimal Assistance and Seated EOB (assist to pull down in back)    Lower Extremity Dressing: . - Underwear: Maximal Assistance, Standing, and Seated EOB   - Socks : Total Assistance and Bed Level    Upper Extremity Bathing: . - Upper Extremity Bathing : Set-up Assistance and Seated EOB                  Pain Rating:  Patient reports improvement in right knee pain from day prior     Pain Intervention(s):   repositioning    Activity Tolerance:   Fair , requires rest breaks, and SpO2 stable on room air (oxygen at 97% with activity on room air)  Please refer to the flowsheet for vital signs taken during this treatment.     After treatment:   Patient left in no apparent distress in bed, Call bell within reach, Caregiver / family present, and Side rails x3    COMMUNICATION/EDUCATION:   The patient's plan of care was discussed with: registered nurse    Patient Education  Education

## 2023-10-26 ENCOUNTER — CLINICAL DOCUMENTATION (OUTPATIENT)
Age: 63
End: 2023-10-26

## 2023-10-26 LAB
ANION GAP SERPL CALC-SCNC: 7 MMOL/L (ref 5–15)
BASOPHILS # BLD: 0 K/UL (ref 0–0.1)
BASOPHILS NFR BLD: 0 % (ref 0–1)
BUN SERPL-MCNC: 31 MG/DL (ref 6–20)
BUN/CREAT SERPL: 26 (ref 12–20)
CALCIUM SERPL-MCNC: 9.7 MG/DL (ref 8.5–10.1)
CHLORIDE SERPL-SCNC: 105 MMOL/L (ref 97–108)
CO2 SERPL-SCNC: 24 MMOL/L (ref 21–32)
CREAT SERPL-MCNC: 1.19 MG/DL (ref 0.7–1.3)
DIFFERENTIAL METHOD BLD: ABNORMAL
EOSINOPHIL # BLD: 0 K/UL (ref 0–0.4)
EOSINOPHIL NFR BLD: 0 % (ref 0–7)
ERYTHROCYTE [DISTWIDTH] IN BLOOD BY AUTOMATED COUNT: 18.1 % (ref 11.5–14.5)
GLUCOSE BLD STRIP.AUTO-MCNC: 119 MG/DL (ref 65–117)
GLUCOSE BLD STRIP.AUTO-MCNC: 123 MG/DL (ref 65–117)
GLUCOSE BLD STRIP.AUTO-MCNC: 130 MG/DL (ref 65–117)
GLUCOSE BLD STRIP.AUTO-MCNC: 143 MG/DL (ref 65–117)
GLUCOSE SERPL-MCNC: 146 MG/DL (ref 65–100)
HCT VFR BLD AUTO: 45 % (ref 36.6–50.3)
HGB BLD-MCNC: 13.2 G/DL (ref 12.1–17)
IMM GRANULOCYTES # BLD AUTO: 0 K/UL (ref 0–0.04)
IMM GRANULOCYTES NFR BLD AUTO: 0 % (ref 0–0.5)
LYMPHOCYTES # BLD: 1 K/UL (ref 0.8–3.5)
LYMPHOCYTES NFR BLD: 11 % (ref 12–49)
MCH RBC QN AUTO: 22.3 PG (ref 26–34)
MCHC RBC AUTO-ENTMCNC: 29.3 G/DL (ref 30–36.5)
MCV RBC AUTO: 75.9 FL (ref 80–99)
MONOCYTES # BLD: 0.7 K/UL (ref 0–1)
MONOCYTES NFR BLD: 8 % (ref 5–13)
NEUTS SEG # BLD: 7.4 K/UL (ref 1.8–8)
NEUTS SEG NFR BLD: 81 % (ref 32–75)
NRBC # BLD: 0 K/UL (ref 0–0.01)
NRBC BLD-RTO: 0 PER 100 WBC
PLATELET # BLD AUTO: 361 K/UL (ref 150–400)
PMV BLD AUTO: 9.3 FL (ref 8.9–12.9)
POTASSIUM SERPL-SCNC: 3.8 MMOL/L (ref 3.5–5.1)
RBC # BLD AUTO: 5.93 M/UL (ref 4.1–5.7)
SERVICE CMNT-IMP: ABNORMAL
SODIUM SERPL-SCNC: 136 MMOL/L (ref 136–145)
WBC # BLD AUTO: 9.1 K/UL (ref 4.1–11.1)

## 2023-10-26 PROCEDURE — 6370000000 HC RX 637 (ALT 250 FOR IP): Performed by: FAMILY MEDICINE

## 2023-10-26 PROCEDURE — 36415 COLL VENOUS BLD VENIPUNCTURE: CPT

## 2023-10-26 PROCEDURE — 99231 SBSQ HOSP IP/OBS SF/LOW 25: CPT | Performed by: PHYSICIAN ASSISTANT

## 2023-10-26 PROCEDURE — 97110 THERAPEUTIC EXERCISES: CPT

## 2023-10-26 PROCEDURE — 1100000000 HC RM PRIVATE

## 2023-10-26 PROCEDURE — 85025 COMPLETE CBC W/AUTO DIFF WBC: CPT

## 2023-10-26 PROCEDURE — 2580000003 HC RX 258: Performed by: FAMILY MEDICINE

## 2023-10-26 PROCEDURE — 97530 THERAPEUTIC ACTIVITIES: CPT

## 2023-10-26 PROCEDURE — 82962 GLUCOSE BLOOD TEST: CPT

## 2023-10-26 PROCEDURE — 97116 GAIT TRAINING THERAPY: CPT

## 2023-10-26 PROCEDURE — 80048 BASIC METABOLIC PNL TOTAL CA: CPT

## 2023-10-26 PROCEDURE — 6370000000 HC RX 637 (ALT 250 FOR IP)

## 2023-10-26 RX ADMIN — METOPROLOL SUCCINATE 50 MG: 50 TABLET, EXTENDED RELEASE ORAL at 09:44

## 2023-10-26 RX ADMIN — BENZONATATE 100 MG: 100 CAPSULE ORAL at 23:21

## 2023-10-26 RX ADMIN — APIXABAN 5 MG: 5 TABLET, FILM COATED ORAL at 21:45

## 2023-10-26 RX ADMIN — ACETAMINOPHEN 650 MG: 325 TABLET ORAL at 16:03

## 2023-10-26 RX ADMIN — SODIUM CHLORIDE, PRESERVATIVE FREE 10 ML: 5 INJECTION INTRAVENOUS at 10:06

## 2023-10-26 RX ADMIN — SODIUM CHLORIDE, PRESERVATIVE FREE 10 ML: 5 INJECTION INTRAVENOUS at 22:12

## 2023-10-26 RX ADMIN — OXYCODONE HYDROCHLORIDE 5 MG: 5 TABLET ORAL at 23:20

## 2023-10-26 RX ADMIN — ROSUVASTATIN CALCIUM 20 MG: 10 TABLET, FILM COATED ORAL at 09:44

## 2023-10-26 RX ADMIN — SACUBITRIL AND VALSARTAN 1 TABLET: 49; 51 TABLET, FILM COATED ORAL at 21:30

## 2023-10-26 RX ADMIN — OXYCODONE HYDROCHLORIDE 5 MG: 5 TABLET ORAL at 07:22

## 2023-10-26 RX ADMIN — APIXABAN 5 MG: 5 TABLET, FILM COATED ORAL at 09:45

## 2023-10-26 RX ADMIN — ACETAMINOPHEN 650 MG: 325 TABLET ORAL at 07:22

## 2023-10-26 RX ADMIN — TRAMADOL HYDROCHLORIDE 50 MG: 50 TABLET ORAL at 02:03

## 2023-10-26 RX ADMIN — OXYCODONE HYDROCHLORIDE 5 MG: 5 TABLET ORAL at 19:15

## 2023-10-26 RX ADMIN — ACETAMINOPHEN 650 MG: 325 TABLET ORAL at 22:10

## 2023-10-26 RX ADMIN — SACUBITRIL AND VALSARTAN 1 TABLET: 49; 51 TABLET, FILM COATED ORAL at 09:51

## 2023-10-26 RX ADMIN — OXYCODONE HYDROCHLORIDE 5 MG: 5 TABLET ORAL at 11:37

## 2023-10-26 RX ADMIN — FLUTICASONE PROPIONATE 1 SPRAY: 50 SPRAY, METERED NASAL at 09:45

## 2023-10-26 RX ADMIN — ALLOPURINOL 100 MG: 100 TABLET ORAL at 09:45

## 2023-10-26 RX ADMIN — BENZONATATE 100 MG: 100 CAPSULE ORAL at 09:51

## 2023-10-26 ASSESSMENT — PAIN SCALES - GENERAL
PAINLEVEL_OUTOF10: 7
PAINLEVEL_OUTOF10: 7
PAINLEVEL_OUTOF10: 4
PAINLEVEL_OUTOF10: 6
PAINLEVEL_OUTOF10: 7
PAINLEVEL_OUTOF10: 7
PAINLEVEL_OUTOF10: 3
PAINLEVEL_OUTOF10: 5

## 2023-10-26 ASSESSMENT — PAIN DESCRIPTION - DESCRIPTORS
DESCRIPTORS: ACHING
DESCRIPTORS: ACHING;SORE

## 2023-10-26 ASSESSMENT — PAIN DESCRIPTION - ORIENTATION
ORIENTATION: RIGHT

## 2023-10-26 ASSESSMENT — PAIN DESCRIPTION - LOCATION
LOCATION: KNEE

## 2023-10-26 ASSESSMENT — PAIN - FUNCTIONAL ASSESSMENT
PAIN_FUNCTIONAL_ASSESSMENT: ACTIVITIES ARE NOT PREVENTED

## 2023-10-26 NOTE — PROGRESS NOTES
[facility] [address] [facility]  Hospitalist Group                                                                                Hospitalist Progress Note  Charis Olivarez, APRN - NP        Date of Service:  10/26/2023  NAME:  Oral Home Sr.  :  1960  MRN:  [id number]    Admission Summary:     Mr. Andrea Lugo is a 61 y.o. male with a pmhx gout, DM II, a-fib, HTN, sleep apnea, and morbid obesity who presents with right knee pain and inability to ambulate. His knee pain after started after consuming large amount of alcohol. Denied fever, chills, nausea, or vomiting. In the ED, BP was elevated to 182/105, he was also intermittently hypoxic during sleep, not on cpap. XR right knee showed moderate joint effusion. Ortho was consulted and aspirated the joint sending the fluids for study. Interval history / Subjective:        Patient was seen and examined this afternoon, he was sitting up in a chair in no acute distress. Cooperative and interactive with assessment. Reports that he worked with physical therapy today, moving knee much better today and was able to ambulate to the door and back. He even get up out of bed overnight to use the bathroom. Discussed plan of care with physical therapy, they indicate that patient's mobility was improved and he may go home with home health but recommending that he stay downstairs for the next several days as he has 15 stairs to his second floor. Patient reports that he can do this. Assessment & Plan:     Right knee effusion, could be gout flare  -s/p drainage by ortho: fluid positive for uric acid crystals  -fluid culture pending - ortho called micro, no growth through 10/25  -Ortho following: gave steroid injection 10/25  -continue home allopurinol, colchicine prn  -continue lidocaine patch  -scheduled Tylenol and prn oxycodone for moderate-severe pain. D/C Tramadol.     Dry cough  Hypoxia  Elevated D-dimer  -CXR with no acute issues  -WBC 9.8, patient suppository 650 mg  650 mg Rectal Q6H PRN    allopurinol (ZYLOPRIM) tablet 100 mg  100 mg Oral Daily    apixaban (ELIQUIS) tablet 5 mg  5 mg Oral BID    metoprolol succinate (TOPROL XL) extended release tablet 50 mg  50 mg Oral Daily    rosuvastatin (CRESTOR) tablet 20 mg  20 mg Oral Daily    sacubitril-valsartan (ENTRESTO) 49-51 MG per tablet 1 tablet  1 tablet Oral BID    [Held by provider] furosemide (LASIX) tablet 20 mg  20 mg Oral Daily    insulin lispro (HUMALOG) injection vial 0-4 Units  0-4 Units SubCUTAneous TID WC    insulin lispro (HUMALOG) injection vial 0-4 Units  0-4 Units SubCUTAneous Nightly    lidocaine 4 % external patch 1 patch  1 patch TransDERmal Daily    glucose chewable tablet 16 g  4 tablet Oral PRN    dextrose bolus 10% 125 mL  125 mL IntraVENous PRN    Or    dextrose bolus 10% 250 mL  250 mL IntraVENous PRN    glucagon injection 1 mg  1 mg SubCUTAneous PRN    dextrose 10 % infusion   IntraVENous Continuous PRN    benzonatate (TESSALON) capsule 100 mg  100 mg Oral TID PRN   ______________________________________________________________________  EXPECTED LENGTH OF STAY: Unable to retrieve estimated LOS  ACTUAL LENGTH OF STAY:          3               KANDI Gonzales NP

## 2023-10-26 NOTE — PROGRESS NOTES
Reviewed Remede System for Central Sleep Apnea with patient including:   What is Central Sleep Apnea  Remede System--how it works  Implant Procedure  Follow up with [facility] including activation; programing changes and final sleep study    Patient shown Remede implant model. Answered all questions. Patient given educational information. Consent form was not filled out.  Patient wants to talk to Remede patient liaison first.    Will follow up in 4 weeks as patient is an in-patient now and will be discharged soon, but has some PT rehab to follow up with first.    Kitty Dallas, CSE

## 2023-10-26 NOTE — PLAN OF CARE
Problem: Physical Therapy - Adult  Goal: By Discharge: Performs mobility at highest level of function for planned discharge setting. See evaluation for individualized goals. Description: FUNCTIONAL STATUS PRIOR TO ADMISSION: Patient was independent and active without use of DME.    HOME SUPPORT PRIOR TO ADMISSION: The patient lived with wife but did not require assistance until the last ~1 week. Physical Therapy Goals  Initiated 10/23/2023  1. Patient will move from supine to sit and sit to supine, scoot up and down, and roll side to side in bed with moderate assistance within 7 day(s). 2.  Patient will perform sit to stand with moderate assistance within 7 day(s). 3.  Patient will transfer from bed to chair and chair to bed with moderate assistance using the least restrictive device within 7 day(s). 4.  Patient will ambulate with moderate assistance for 15 feet with the least restrictive device within 7 day(s). 5.  Patient will ascend/descend 5 stairs with bilat handrail(s) with moderate assistance within 7 day(s). Outcome: Progressing       PHYSICAL THERAPY TREATMENT    Patient: Jimena Starr SrTamie (64 y.o. male)  Date: 10/26/2023  Diagnosis: Acute gouty arthritis [M10.9]  History of gout [Z87.39]  Right knee pain [M25.561]  Pain and swelling of right knee [M25.561, M25.461]  Cough, unspecified type [R05.9] Right knee pain      Precautions: Fall Risk                    ASSESSMENT:  Patient continues to benefit from skilled PT services and is progressing towards goals. Pt generally mobilized at a Min A to CGA level during today's session. Pt received semi wallace in bed, on RA, agreeable to work with therapy. Pt performed bed mobility, transfer training, gait training, and provided therex during session. Pt endorsed improved mobility and decreased pain following steroid injection previous day. Pt remained up in chair at end of session.      Previous d/c rec is IPR however pt may progress to d/c home with HHPT pending progress. Will continue to update d/c plan with further progress         PLAN:  Patient continues to benefit from skilled intervention to address the above impairments. Continue treatment per established plan of care. Recommendation for discharge: (in order for the patient to meet his/her long term goals): Continue to assess pending progress IPR vs HHPT (likely HHPT with continued progress)    Other factors to consider for discharge: high risk for falls and concern for safely navigating or managing the home environment    IF patient discharges home will need the following DME: rolling walker       SUBJECTIVE:   Patient stated, \"It feels better today. \"    OBJECTIVE DATA SUMMARY:   Critical Behavior:  Orientation  Overall Orientation Status: Within Normal Limits  Orientation Level: Oriented X4       Functional Mobility Training:  Bed Mobility:  Bed Mobility Training  Bed Mobility Training: Yes  Supine to Sit: Contact-guard assistance  Sit to Supine:  (NT, remained up in chair at end of session)  Scooting: Stand-by assistance  Transfers:  Transfer Training  Transfer Training: Yes  Sit to Stand: Minimum assistance; Adaptive equipment; Additional time  Stand to Sit: Contact-guard assistance; Additional time; Adaptive equipment  Balance:  Balance  Sitting: Intact  Standing: Impaired  Standing - Static: Fair;Constant support  Standing - Dynamic: Fair;Constant support   Ambulation/Gait Training:     Gait  Overall Level of Assistance: Contact-guard assistance;Stand-by assistance  Distance (ft): 30 Feet  Assistive Device: Gait belt;Walker, rolling  Base of Support: Shift to left;Center of gravity altered  Speed/Nona: Slow;Pace decreased (< 100 feet/min)  Step Length: Right shortened;Left shortened  Swing Pattern: Right asymmetrical;Left asymmetrical  Stance: Weight shift  Gait Abnormalities: Antalgic;Decreased step clearance;Trunk sway increased    Pain Rating:  Pt did not quantify pain during

## 2023-10-26 NOTE — CARE COORDINATION
Transition of Care Plan:    RUR: 11 %  Prior Level of Functioning:     Disposition: Home with home health PT and OT /RW ordered in [place]-  delivered to the room. Referral sent to AT [address] [place] [address] in [facility] -accepted. Follow up appointments: PCP  DME needed: RW ordered in [address] at discharge: Family  IM/IMM Medicare/ letter given:  yes 10/24 and 10/27  Caregiver Contact: Chase Boone [phone]  Discharge Caregiver contacted prior to discharge? yes  Barriers to discharge: Medical stability    CM spoke with the hospitalist NP- JAIRO for tomorrow 10/27 home with [address] [facility] PT and OT/RW. Will continue to follow.      MARGARET MoralezW

## 2023-10-26 NOTE — CONSULTS
Chart reviewed and pt to be seen in the afternoon of 10/26/23. Thank you for this consultation  Please feel free to contact me directly with any questions or concerns. Jazzy Greene, [address] [facility],[address]

## 2023-10-26 NOTE — CARE COORDINATION
10/26/23 1400   Condition of Participation: Discharge Planning   The Plan for Transition of Care is related to the following treatment goals: home health PT and OT - AT [address] [place] [address]   The Patient and/or Patient Representative was provided with a Choice of Provider? Patient   The Patient and/Or Patient Representative agree with the Discharge Plan? Yes   Freedom of Choice list was provided with basic dialogue that supports the patient's individualized plan of care/goals, treatment preferences, and shares the quality data associated with the providers?   Yes

## 2023-10-27 VITALS
RESPIRATION RATE: 15 BRPM | HEART RATE: 70 BPM | OXYGEN SATURATION: 96 % | TEMPERATURE: 97.7 F | DIASTOLIC BLOOD PRESSURE: 81 MMHG | SYSTOLIC BLOOD PRESSURE: 143 MMHG

## 2023-10-27 PROBLEM — M25.561 RIGHT KNEE PAIN: Status: RESOLVED | Noted: 2023-10-23 | Resolved: 2023-10-27

## 2023-10-27 PROBLEM — M10.9 ACUTE GOUT OF RIGHT KNEE: Status: RESOLVED | Noted: 2023-10-25 | Resolved: 2023-10-27

## 2023-10-27 LAB
GLUCOSE BLD STRIP.AUTO-MCNC: 121 MG/DL (ref 65–117)
GLUCOSE BLD STRIP.AUTO-MCNC: 175 MG/DL (ref 65–117)
SERVICE CMNT-IMP: ABNORMAL
SERVICE CMNT-IMP: ABNORMAL

## 2023-10-27 PROCEDURE — 97535 SELF CARE MNGMENT TRAINING: CPT

## 2023-10-27 PROCEDURE — 6370000000 HC RX 637 (ALT 250 FOR IP): Performed by: FAMILY MEDICINE

## 2023-10-27 PROCEDURE — 6370000000 HC RX 637 (ALT 250 FOR IP): Performed by: NURSE PRACTITIONER

## 2023-10-27 PROCEDURE — 6370000000 HC RX 637 (ALT 250 FOR IP)

## 2023-10-27 PROCEDURE — 2580000003 HC RX 258: Performed by: FAMILY MEDICINE

## 2023-10-27 PROCEDURE — 82962 GLUCOSE BLOOD TEST: CPT

## 2023-10-27 RX ORDER — SIMETHICONE 80 MG
80 TABLET,CHEWABLE ORAL EVERY 6 HOURS PRN
Status: DISCONTINUED | OUTPATIENT
Start: 2023-10-27 | End: 2023-10-27 | Stop reason: HOSPADM

## 2023-10-27 RX ORDER — OXYCODONE HYDROCHLORIDE 5 MG/1
5 TABLET ORAL EVERY 4 HOURS PRN
Qty: 18 TABLET | Refills: 0 | Status: SHIPPED | OUTPATIENT
Start: 2023-10-27 | End: 2023-10-30

## 2023-10-27 RX ORDER — FLUTICASONE PROPIONATE 50 MCG
1 SPRAY, SUSPENSION (ML) NASAL DAILY
Qty: 16 G | Refills: 0 | Status: SHIPPED
Start: 2023-10-28

## 2023-10-27 RX ADMIN — BENZONATATE 100 MG: 100 CAPSULE ORAL at 07:18

## 2023-10-27 RX ADMIN — FLUTICASONE PROPIONATE 1 SPRAY: 50 SPRAY, METERED NASAL at 07:20

## 2023-10-27 RX ADMIN — METOPROLOL SUCCINATE 50 MG: 50 TABLET, EXTENDED RELEASE ORAL at 08:16

## 2023-10-27 RX ADMIN — OXYCODONE HYDROCHLORIDE 5 MG: 5 TABLET ORAL at 07:18

## 2023-10-27 RX ADMIN — OXYCODONE HYDROCHLORIDE 5 MG: 5 TABLET ORAL at 03:14

## 2023-10-27 RX ADMIN — SODIUM CHLORIDE, PRESERVATIVE FREE 10 ML: 5 INJECTION INTRAVENOUS at 08:16

## 2023-10-27 RX ADMIN — SIMETHICONE 80 MG: 80 TABLET, CHEWABLE ORAL at 10:29

## 2023-10-27 RX ADMIN — ALLOPURINOL 100 MG: 100 TABLET ORAL at 08:15

## 2023-10-27 RX ADMIN — APIXABAN 5 MG: 5 TABLET, FILM COATED ORAL at 08:16

## 2023-10-27 RX ADMIN — OXYCODONE HYDROCHLORIDE 5 MG: 5 TABLET ORAL at 12:40

## 2023-10-27 RX ADMIN — ACETAMINOPHEN 650 MG: 325 TABLET ORAL at 07:19

## 2023-10-27 RX ADMIN — SACUBITRIL AND VALSARTAN 1 TABLET: 49; 51 TABLET, FILM COATED ORAL at 08:16

## 2023-10-27 RX ADMIN — ROSUVASTATIN CALCIUM 20 MG: 10 TABLET, FILM COATED ORAL at 08:15

## 2023-10-27 ASSESSMENT — PAIN SCALES - GENERAL
PAINLEVEL_OUTOF10: 5
PAINLEVEL_OUTOF10: 7
PAINLEVEL_OUTOF10: 3
PAINLEVEL_OUTOF10: 3
PAINLEVEL_OUTOF10: 7
PAINLEVEL_OUTOF10: 7

## 2023-10-27 ASSESSMENT — PAIN DESCRIPTION - ORIENTATION
ORIENTATION: RIGHT

## 2023-10-27 ASSESSMENT — PAIN DESCRIPTION - DESCRIPTORS
DESCRIPTORS: ACHING
DESCRIPTORS: ACHING;SORE
DESCRIPTORS: ACHING
DESCRIPTORS: ACHING

## 2023-10-27 ASSESSMENT — PAIN - FUNCTIONAL ASSESSMENT
PAIN_FUNCTIONAL_ASSESSMENT: ACTIVITIES ARE NOT PREVENTED
PAIN_FUNCTIONAL_ASSESSMENT: PREVENTS OR INTERFERES SOME ACTIVE ACTIVITIES AND ADLS

## 2023-10-27 ASSESSMENT — PAIN DESCRIPTION - LOCATION
LOCATION: KNEE

## 2023-10-27 NOTE — PLAN OF CARE
Problem: Occupational Therapy - Adult  Goal: By Discharge: Performs self-care activities at highest level of function for planned discharge setting. See evaluation for individualized goals. Description: FUNCTIONAL STATUS PRIOR TO ADMISSION:  Patient was independent and active without device, performing all ADLs (to include standing showering). Patient with decline over past week due to pain, requiring spouse assist for ADLs (recently limited to bed level toileting). HOME SUPPORT: Patient lived with his wife. Occupational Therapy Goals:  Initiated 10/23/2023  1. Patient will perform upper body dressing with Supervision/Set-up within 7 day(s). 2.  Patient will perform lower body dressing with Minimal Assist using most appropriate DME / AE prn within 7 day(s). 3.  Patient will perform toilet transfers to [facility] with Moderate Assist using most appropriate DME prn within 7 day(s). 4.  Patient will perform all aspects of toileting with Minimal Assist using most appropriate DME prn within 7 day(s). 5.  Patient will participate in upper extremity therapeutic exercise/activities with Supervision for 7 minutes within 7 day(s). 6.  Patient will utilize energy conservation techniques during functional activities with verbal cues within 7 day(s). Outcome: Progressing   OCCUPATIONAL THERAPY TREATMENT  Patient: Sidra Hermosillo Sr. (64 y.o. male)  Date: 10/27/2023  Primary Diagnosis: Acute gouty arthritis [M10.9]  History of gout [Z87.39]  Right knee pain [M25.561]  Pain and swelling of right knee [M25.561, M25.461]  Cough, unspecified type [R05.9]       Precautions: Fall Risk                Chart, occupational therapy assessment, plan of care, and goals were reviewed. ASSESSMENT  Patient continues to benefit from skilled OT services and is progressing towards goals. On this date, patient was received semi supine in bed with wife present in room and agreeable to therapy.  Patient was overall SBA-CGA for bed

## 2023-10-27 NOTE — PROGRESS NOTES
Pierre Packer RN reviewed discharge instructions with the patient. The patient verbalized understanding. IV access was discontinued and all personal belongings were gathered.

## 2023-10-27 NOTE — DISCHARGE SUMMARY
Discharge Summary     PATIENT ID: Timmy Mera Sr. MRN: [id number]   YOB: 1960    DATE OF ADMISSION: 10/22/2023  8:48 PM    DATE OF DISCHARGE: 10/27/2023   PRIMARY CARE PROVIDER: Mandeep Salmon MD   ATTENDING PHYSICIAN: Padmini Buchanan MD  DISCHARGING PROVIDER: KANDI Tejeda - NP      CONSULTATIONS: IP CONSULT TO ORTHOPEDIC SURGERY  IP CONSULT TO PHYSICAL MEDICINE REHAB  IP CONSULT TO CASE MANAGEMENT    PROCEDURES/SURGERIES: * No surgery found *     ADMITTING [address] [facility] COURSE:   Mr. Carl Montana is a 61 y.o. male with a pmhx gout, DM II, a-fib, HTN, sleep apnea, and morbid obesity who presents with right knee pain and inability to ambulate. His knee pain after started after consuming large amount of alcohol. Denied fever, chills, nausea, or vomiting. In the ED, BP was elevated to 182/105, he was also intermittently hypoxic during sleep, not on cpap. XR right knee showed moderate joint effusion. Ortho was consulted and aspirated the joint sending the fluids for study. DISCHARGE DIAGNOSES / PLAN:      Right knee effusion, gout flare?  -s/p drainage by ortho: fluid positive for uric acid crystals  -fluid culture pending - ortho called micro, no growth through 10/25  -Ortho gave steroid injection 10/25, pt has had significant improvement with pain and mobility  -continue home allopurinol  -lidocaine patch (OTC) as needed  -scheduled Tylenol and prn oxycodone for moderate-severe pain.    -  PT/OT and rolling walker provided     Dry cough  Hypoxia  Elevated D-dimer  -CXR with no acute issues  -WBC 9.8, patient afebrile  -PRN benzonatate  -wean oxygen as able to maintain Spo2 >92% -- sats 93% on RA today  -D-dimer (10/23): 1.12  -chest CTA (10/24): no acute PE, moderate cardiomegaly and pulmonary artery htn  -bilateral venous duplex US (10/24): no DVT  -added flonase 10/25 for possible post nasal drip related cough - pt reports this has significantly helped, will continue on mg            CHANGE how you take these medications      sacubitril-valsartan 49-51 MG per tablet  Commonly known as: ENTRESTO  Take 1 tablet by mouth 2 times daily  What changed: Another medication with the same name was removed. Continue taking this medication, and follow the directions you see here. CONTINUE taking these medications      allopurinol 100 MG tablet  Commonly known as: ZYLOPRIM     apixaban 5 MG Tabs tablet  Commonly known as: ELIQUIS  Take 1 tablet by mouth 2 times daily     FREESTYLE LITE strip  Generic drug: blood glucose test strips     furosemide 20 MG tablet  Commonly known as: Lasix  Take 1 tablet by mouth daily     Jardiance 10 MG tablet  Generic drug: empagliflozin     metoprolol succinate 50 MG extended release tablet  Commonly known as: Toprol XL  Take 1 tablet by mouth daily     rosuvastatin 20 MG tablet  Commonly known as: CRESTOR     vitamin D3 10 MCG (400 UNIT) Tabs tablet  Commonly known as: CHOLECALCIFEROL               Where to Get Your Medications        These medications were sent to [address] [place] [address] [phone]  [address]      Phone: [phone]   oxyCODONE 5 MG immediate release tablet       Information about where to get these medications is not yet available    Ask your nurse or doctor about these medications  fluticasone 50 MCG/ACT nasal spray       NOTIFY YOUR PHYSICIAN FOR ANY OF THE FOLLOWING:   Fever over 101 degrees for 24 hours. Chest pain, shortness of breath, fever, chills, nausea, vomiting, diarrhea, change in mentation, falling, weakness, bleeding. Severe pain or pain not relieved by medications. Or, any other signs or symptoms that you may have questions about.     DISPOSITION:   xx Home With:  xx OT xx PT xx HH  RN       Long term SNF/Inpatient Rehab    Independent/assisted living    Hospice    Other:     PATIENT CONDITION AT DISCHARGE:   Functional status    Poor

## 2023-10-28 LAB
BACTERIA SPEC CULT: NORMAL
GRAM STN SPEC: NORMAL
GRAM STN SPEC: NORMAL
SERVICE CMNT-IMP: NORMAL

## 2023-11-11 ENCOUNTER — APPOINTMENT (OUTPATIENT)
Facility: HOSPITAL | Age: 63
DRG: 554 | End: 2023-11-11
Payer: OTHER GOVERNMENT

## 2023-11-11 ENCOUNTER — HOSPITAL ENCOUNTER (INPATIENT)
Facility: HOSPITAL | Age: 63
LOS: 1 days | Discharge: HOME HEALTH | DRG: 554 | End: 2023-11-14
Attending: EMERGENCY MEDICINE | Admitting: INTERNAL MEDICINE
Payer: OTHER GOVERNMENT

## 2023-11-11 DIAGNOSIS — I50.32 CHRONIC HEART FAILURE WITH PRESERVED EJECTION FRACTION (HCC): ICD-10-CM

## 2023-11-11 DIAGNOSIS — M10.9 ACUTE GOUTY ARTHRITIS: Primary | ICD-10-CM

## 2023-11-11 DIAGNOSIS — R26.2 INABILITY TO AMBULATE DUE TO KNEE: ICD-10-CM

## 2023-11-11 DIAGNOSIS — M25.561 ACUTE PAIN OF RIGHT KNEE: ICD-10-CM

## 2023-11-11 PROBLEM — M25.569 KNEE PAIN: Status: ACTIVE | Noted: 2023-11-11

## 2023-11-11 LAB
ALBUMIN SERPL-MCNC: 3.4 G/DL (ref 3.5–5)
ALBUMIN/GLOB SERPL: 0.7 (ref 1.1–2.2)
ALP SERPL-CCNC: 70 U/L (ref 45–117)
ALT SERPL-CCNC: 38 U/L (ref 12–78)
ANION GAP SERPL CALC-SCNC: 9 MMOL/L (ref 5–15)
AST SERPL-CCNC: 24 U/L (ref 15–37)
BASOPHILS # BLD: 0 K/UL (ref 0–0.1)
BASOPHILS NFR BLD: 0 % (ref 0–1)
BILIRUB SERPL-MCNC: 2.7 MG/DL (ref 0.2–1)
BUN SERPL-MCNC: 19 MG/DL (ref 6–20)
BUN/CREAT SERPL: 14 (ref 12–20)
CALCIUM SERPL-MCNC: 10.1 MG/DL (ref 8.5–10.1)
CHLORIDE SERPL-SCNC: 101 MMOL/L (ref 97–108)
CO2 SERPL-SCNC: 26 MMOL/L (ref 21–32)
CREAT SERPL-MCNC: 1.35 MG/DL (ref 0.7–1.3)
DIFFERENTIAL METHOD BLD: ABNORMAL
EOSINOPHIL # BLD: 0 K/UL (ref 0–0.4)
EOSINOPHIL NFR BLD: 1 % (ref 0–7)
ERYTHROCYTE [DISTWIDTH] IN BLOOD BY AUTOMATED COUNT: 18.7 % (ref 11.5–14.5)
GLOBULIN SER CALC-MCNC: 4.9 G/DL (ref 2–4)
GLUCOSE BLD STRIP.AUTO-MCNC: 188 MG/DL (ref 65–117)
GLUCOSE SERPL-MCNC: 147 MG/DL (ref 65–100)
HCT VFR BLD AUTO: 48.6 % (ref 36.6–50.3)
HGB BLD-MCNC: 14.5 G/DL (ref 12.1–17)
IMM GRANULOCYTES # BLD AUTO: 0 K/UL (ref 0–0.04)
IMM GRANULOCYTES NFR BLD AUTO: 0 % (ref 0–0.5)
LYMPHOCYTES # BLD: 1.3 K/UL (ref 0.8–3.5)
LYMPHOCYTES NFR BLD: 16 % (ref 12–49)
MCH RBC QN AUTO: 22.8 PG (ref 26–34)
MCHC RBC AUTO-ENTMCNC: 29.8 G/DL (ref 30–36.5)
MCV RBC AUTO: 76.4 FL (ref 80–99)
MONOCYTES # BLD: 0.8 K/UL (ref 0–1)
MONOCYTES NFR BLD: 10 % (ref 5–13)
NEUTS SEG # BLD: 6.2 K/UL (ref 1.8–8)
NEUTS SEG NFR BLD: 73 % (ref 32–75)
NRBC # BLD: 0 K/UL (ref 0–0.01)
NRBC BLD-RTO: 0 PER 100 WBC
PLATELET # BLD AUTO: 285 K/UL (ref 150–400)
PMV BLD AUTO: 9.4 FL (ref 8.9–12.9)
POTASSIUM SERPL-SCNC: 4.5 MMOL/L (ref 3.5–5.1)
PROT SERPL-MCNC: 8.3 G/DL (ref 6.4–8.2)
RBC # BLD AUTO: 6.36 M/UL (ref 4.1–5.7)
SERVICE CMNT-IMP: ABNORMAL
SODIUM SERPL-SCNC: 136 MMOL/L (ref 136–145)
WBC # BLD AUTO: 8.4 K/UL (ref 4.1–11.1)

## 2023-11-11 PROCEDURE — 6360000002 HC RX W HCPCS: Performed by: EMERGENCY MEDICINE

## 2023-11-11 PROCEDURE — 96375 TX/PRO/DX INJ NEW DRUG ADDON: CPT

## 2023-11-11 PROCEDURE — 71045 X-RAY EXAM CHEST 1 VIEW: CPT

## 2023-11-11 PROCEDURE — 6370000000 HC RX 637 (ALT 250 FOR IP): Performed by: EMERGENCY MEDICINE

## 2023-11-11 PROCEDURE — 96374 THER/PROPH/DIAG INJ IV PUSH: CPT

## 2023-11-11 PROCEDURE — 85025 COMPLETE CBC W/AUTO DIFF WBC: CPT

## 2023-11-11 PROCEDURE — 80053 COMPREHEN METABOLIC PANEL: CPT

## 2023-11-11 PROCEDURE — 2580000003 HC RX 258: Performed by: INTERNAL MEDICINE

## 2023-11-11 PROCEDURE — G0378 HOSPITAL OBSERVATION PER HR: HCPCS

## 2023-11-11 PROCEDURE — 99285 EMERGENCY DEPT VISIT HI MDM: CPT

## 2023-11-11 PROCEDURE — 93005 ELECTROCARDIOGRAM TRACING: CPT | Performed by: EMERGENCY MEDICINE

## 2023-11-11 PROCEDURE — 82962 GLUCOSE BLOOD TEST: CPT

## 2023-11-11 PROCEDURE — 6370000000 HC RX 637 (ALT 250 FOR IP): Performed by: INTERNAL MEDICINE

## 2023-11-11 PROCEDURE — 36415 COLL VENOUS BLD VENIPUNCTURE: CPT

## 2023-11-11 RX ORDER — POTASSIUM CHLORIDE 7.45 MG/ML
10 INJECTION INTRAVENOUS PRN
Status: DISCONTINUED | OUTPATIENT
Start: 2023-11-11 | End: 2023-11-14 | Stop reason: HOSPADM

## 2023-11-11 RX ORDER — METOPROLOL SUCCINATE 25 MG/1
50 TABLET, EXTENDED RELEASE ORAL DAILY
Status: DISCONTINUED | OUTPATIENT
Start: 2023-11-12 | End: 2023-11-12

## 2023-11-11 RX ORDER — OMEGA-3S/DHA/EPA/FISH OIL/D3 300MG-1000
400 CAPSULE ORAL DAILY
Status: DISCONTINUED | OUTPATIENT
Start: 2023-11-12 | End: 2023-11-14 | Stop reason: HOSPADM

## 2023-11-11 RX ORDER — ACETAMINOPHEN 325 MG/1
650 TABLET ORAL
Status: COMPLETED | OUTPATIENT
Start: 2023-11-11 | End: 2023-11-11

## 2023-11-11 RX ORDER — ROSUVASTATIN CALCIUM 20 MG/1
20 TABLET, COATED ORAL NIGHTLY
Status: DISCONTINUED | OUTPATIENT
Start: 2023-11-11 | End: 2023-11-14 | Stop reason: HOSPADM

## 2023-11-11 RX ORDER — ACETAMINOPHEN 325 MG/1
650 TABLET ORAL EVERY 6 HOURS PRN
Status: DISCONTINUED | OUTPATIENT
Start: 2023-11-11 | End: 2023-11-14 | Stop reason: HOSPADM

## 2023-11-11 RX ORDER — MAGNESIUM SULFATE IN WATER 40 MG/ML
2000 INJECTION, SOLUTION INTRAVENOUS PRN
Status: DISCONTINUED | OUTPATIENT
Start: 2023-11-11 | End: 2023-11-14 | Stop reason: HOSPADM

## 2023-11-11 RX ORDER — POTASSIUM CHLORIDE 20 MEQ/1
40 TABLET, EXTENDED RELEASE ORAL PRN
Status: DISCONTINUED | OUTPATIENT
Start: 2023-11-11 | End: 2023-11-14 | Stop reason: HOSPADM

## 2023-11-11 RX ORDER — ONDANSETRON 2 MG/ML
4 INJECTION INTRAMUSCULAR; INTRAVENOUS ONCE
Status: COMPLETED | OUTPATIENT
Start: 2023-11-11 | End: 2023-11-11

## 2023-11-11 RX ORDER — SODIUM CHLORIDE 0.9 % (FLUSH) 0.9 %
5-40 SYRINGE (ML) INJECTION EVERY 12 HOURS SCHEDULED
Status: DISCONTINUED | OUTPATIENT
Start: 2023-11-11 | End: 2023-11-14 | Stop reason: HOSPADM

## 2023-11-11 RX ORDER — FENTANYL CITRATE 50 UG/ML
50 INJECTION, SOLUTION INTRAMUSCULAR; INTRAVENOUS
Status: COMPLETED | OUTPATIENT
Start: 2023-11-11 | End: 2023-11-11

## 2023-11-11 RX ORDER — ACETAMINOPHEN 650 MG/1
650 SUPPOSITORY RECTAL EVERY 6 HOURS PRN
Status: DISCONTINUED | OUTPATIENT
Start: 2023-11-11 | End: 2023-11-14 | Stop reason: HOSPADM

## 2023-11-11 RX ORDER — COLCHICINE 0.6 MG/1
0.6 TABLET ORAL ONCE
Status: COMPLETED | OUTPATIENT
Start: 2023-11-11 | End: 2023-11-11

## 2023-11-11 RX ORDER — PREDNISONE 20 MG/1
20 TABLET ORAL DAILY
Status: DISCONTINUED | OUTPATIENT
Start: 2023-11-12 | End: 2023-11-12

## 2023-11-11 RX ORDER — ONDANSETRON 4 MG/1
4 TABLET, ORALLY DISINTEGRATING ORAL EVERY 8 HOURS PRN
Status: DISCONTINUED | OUTPATIENT
Start: 2023-11-11 | End: 2023-11-14 | Stop reason: HOSPADM

## 2023-11-11 RX ORDER — ALLOPURINOL 100 MG/1
100 TABLET ORAL DAILY
Status: DISCONTINUED | OUTPATIENT
Start: 2023-11-12 | End: 2023-11-14 | Stop reason: HOSPADM

## 2023-11-11 RX ORDER — OXYCODONE HYDROCHLORIDE 5 MG/1
5 TABLET ORAL EVERY 4 HOURS PRN
Status: DISCONTINUED | OUTPATIENT
Start: 2023-11-11 | End: 2023-11-14 | Stop reason: HOSPADM

## 2023-11-11 RX ORDER — COLCHICINE 0.6 MG/1
0.6 TABLET ORAL DAILY
Status: DISCONTINUED | OUTPATIENT
Start: 2023-11-12 | End: 2023-11-14 | Stop reason: HOSPADM

## 2023-11-11 RX ORDER — POLYETHYLENE GLYCOL 3350 17 G/17G
17 POWDER, FOR SOLUTION ORAL DAILY PRN
Status: DISCONTINUED | OUTPATIENT
Start: 2023-11-11 | End: 2023-11-14 | Stop reason: HOSPADM

## 2023-11-11 RX ORDER — ONDANSETRON 2 MG/ML
4 INJECTION INTRAMUSCULAR; INTRAVENOUS EVERY 6 HOURS PRN
Status: DISCONTINUED | OUTPATIENT
Start: 2023-11-11 | End: 2023-11-14 | Stop reason: HOSPADM

## 2023-11-11 RX ORDER — SODIUM CHLORIDE 9 MG/ML
INJECTION, SOLUTION INTRAVENOUS PRN
Status: DISCONTINUED | OUTPATIENT
Start: 2023-11-11 | End: 2023-11-14 | Stop reason: HOSPADM

## 2023-11-11 RX ORDER — INSULIN LISPRO 100 [IU]/ML
0-8 INJECTION, SOLUTION INTRAVENOUS; SUBCUTANEOUS
Status: DISCONTINUED | OUTPATIENT
Start: 2023-11-11 | End: 2023-11-14 | Stop reason: HOSPADM

## 2023-11-11 RX ORDER — SODIUM CHLORIDE 0.9 % (FLUSH) 0.9 %
5-40 SYRINGE (ML) INJECTION PRN
Status: DISCONTINUED | OUTPATIENT
Start: 2023-11-11 | End: 2023-11-14 | Stop reason: HOSPADM

## 2023-11-11 RX ORDER — INSULIN LISPRO 100 [IU]/ML
0-4 INJECTION, SOLUTION INTRAVENOUS; SUBCUTANEOUS NIGHTLY
Status: DISCONTINUED | OUTPATIENT
Start: 2023-11-11 | End: 2023-11-14 | Stop reason: HOSPADM

## 2023-11-11 RX ADMIN — ROSUVASTATIN 20 MG: 20 TABLET, FILM COATED ORAL at 22:38

## 2023-11-11 RX ADMIN — FENTANYL CITRATE 50 MCG: 50 INJECTION, SOLUTION INTRAMUSCULAR; INTRAVENOUS at 16:41

## 2023-11-11 RX ADMIN — ACETAMINOPHEN 650 MG: 325 TABLET ORAL at 19:10

## 2023-11-11 RX ADMIN — COLCHICINE 0.6 MG: 0.6 TABLET, FILM COATED ORAL at 17:54

## 2023-11-11 RX ADMIN — METHYLPREDNISOLONE SODIUM SUCCINATE 125 MG: 125 INJECTION, POWDER, FOR SOLUTION INTRAMUSCULAR; INTRAVENOUS at 16:41

## 2023-11-11 RX ADMIN — ONDANSETRON HYDROCHLORIDE 4 MG: 2 INJECTION, SOLUTION INTRAMUSCULAR; INTRAVENOUS at 16:41

## 2023-11-11 RX ADMIN — SACUBITRIL AND VALSARTAN 1 TABLET: 49; 51 TABLET, FILM COATED ORAL at 22:38

## 2023-11-11 RX ADMIN — APIXABAN 5 MG: 5 TABLET, FILM COATED ORAL at 22:38

## 2023-11-11 RX ADMIN — SODIUM CHLORIDE, PRESERVATIVE FREE 10 ML: 5 INJECTION INTRAVENOUS at 22:43

## 2023-11-11 ASSESSMENT — PAIN SCALES - GENERAL
PAINLEVEL_OUTOF10: 2
PAINLEVEL_OUTOF10: 9
PAINLEVEL_OUTOF10: 2

## 2023-11-11 ASSESSMENT — PAIN DESCRIPTION - FREQUENCY: FREQUENCY: CONTINUOUS

## 2023-11-11 ASSESSMENT — PAIN DESCRIPTION - LOCATION
LOCATION: KNEE;LEG
LOCATION: KNEE;ANKLE

## 2023-11-11 ASSESSMENT — PAIN DESCRIPTION - ORIENTATION
ORIENTATION: RIGHT;LEFT
ORIENTATION: RIGHT

## 2023-11-11 ASSESSMENT — PAIN DESCRIPTION - DESCRIPTORS
DESCRIPTORS: STABBING;SHARP
DESCRIPTORS: ACHING

## 2023-11-11 ASSESSMENT — PAIN DESCRIPTION - ONSET: ONSET: PROGRESSIVE

## 2023-11-11 ASSESSMENT — PAIN - FUNCTIONAL ASSESSMENT
PAIN_FUNCTIONAL_ASSESSMENT: PREVENTS OR INTERFERES SOME ACTIVE ACTIVITIES AND ADLS
PAIN_FUNCTIONAL_ASSESSMENT: PREVENTS OR INTERFERES SOME ACTIVE ACTIVITIES AND ADLS

## 2023-11-11 ASSESSMENT — PAIN DESCRIPTION - PAIN TYPE: TYPE: ACUTE PAIN

## 2023-11-11 NOTE — ED NOTES
Assumed care of pt at this time.  Report received from Rudy, [facility], [address] [place] [address]  11/11/23 6862

## 2023-11-12 LAB
ANION GAP SERPL CALC-SCNC: 7 MMOL/L (ref 5–15)
BASOPHILS # BLD: 0 K/UL (ref 0–0.1)
BASOPHILS NFR BLD: 0 % (ref 0–1)
BUN SERPL-MCNC: 25 MG/DL (ref 6–20)
BUN/CREAT SERPL: 22 (ref 12–20)
CALCIUM SERPL-MCNC: 9.5 MG/DL (ref 8.5–10.1)
CHLORIDE SERPL-SCNC: 105 MMOL/L (ref 97–108)
CO2 SERPL-SCNC: 22 MMOL/L (ref 21–32)
CREAT SERPL-MCNC: 1.15 MG/DL (ref 0.7–1.3)
DIFFERENTIAL METHOD BLD: ABNORMAL
EOSINOPHIL # BLD: 0 K/UL (ref 0–0.4)
EOSINOPHIL NFR BLD: 0 % (ref 0–7)
ERYTHROCYTE [DISTWIDTH] IN BLOOD BY AUTOMATED COUNT: 18.7 % (ref 11.5–14.5)
GLUCOSE BLD STRIP.AUTO-MCNC: 136 MG/DL (ref 65–117)
GLUCOSE BLD STRIP.AUTO-MCNC: 155 MG/DL (ref 65–117)
GLUCOSE BLD STRIP.AUTO-MCNC: 165 MG/DL (ref 65–117)
GLUCOSE BLD STRIP.AUTO-MCNC: 179 MG/DL (ref 65–117)
GLUCOSE SERPL-MCNC: 177 MG/DL (ref 65–100)
HCT VFR BLD AUTO: 46.2 % (ref 36.6–50.3)
HGB BLD-MCNC: 14.1 G/DL (ref 12.1–17)
IMM GRANULOCYTES # BLD AUTO: 0 K/UL (ref 0–0.04)
IMM GRANULOCYTES NFR BLD AUTO: 0 % (ref 0–0.5)
LYMPHOCYTES # BLD: 0.7 K/UL (ref 0.8–3.5)
LYMPHOCYTES NFR BLD: 10 % (ref 12–49)
MCH RBC QN AUTO: 22.6 PG (ref 26–34)
MCHC RBC AUTO-ENTMCNC: 30.5 G/DL (ref 30–36.5)
MCV RBC AUTO: 73.9 FL (ref 80–99)
MONOCYTES # BLD: 0.2 K/UL (ref 0–1)
MONOCYTES NFR BLD: 3 % (ref 5–13)
NEUTS SEG # BLD: 6.2 K/UL (ref 1.8–8)
NEUTS SEG NFR BLD: 87 % (ref 32–75)
NRBC # BLD: 0 K/UL (ref 0–0.01)
NRBC BLD-RTO: 0 PER 100 WBC
PLATELET # BLD AUTO: 255 K/UL (ref 150–400)
PMV BLD AUTO: 9.6 FL (ref 8.9–12.9)
POTASSIUM SERPL-SCNC: 4.7 MMOL/L (ref 3.5–5.1)
RBC # BLD AUTO: 6.25 M/UL (ref 4.1–5.7)
RBC MORPH BLD: ABNORMAL
SERVICE CMNT-IMP: ABNORMAL
SODIUM SERPL-SCNC: 134 MMOL/L (ref 136–145)
WBC # BLD AUTO: 7.1 K/UL (ref 4.1–11.1)

## 2023-11-12 PROCEDURE — 2580000003 HC RX 258: Performed by: INTERNAL MEDICINE

## 2023-11-12 PROCEDURE — 97161 PT EVAL LOW COMPLEX 20 MIN: CPT

## 2023-11-12 PROCEDURE — 97530 THERAPEUTIC ACTIVITIES: CPT

## 2023-11-12 PROCEDURE — 97165 OT EVAL LOW COMPLEX 30 MIN: CPT

## 2023-11-12 PROCEDURE — 6370000000 HC RX 637 (ALT 250 FOR IP): Performed by: INTERNAL MEDICINE

## 2023-11-12 PROCEDURE — G0378 HOSPITAL OBSERVATION PER HR: HCPCS

## 2023-11-12 PROCEDURE — 36415 COLL VENOUS BLD VENIPUNCTURE: CPT

## 2023-11-12 PROCEDURE — 85025 COMPLETE CBC W/AUTO DIFF WBC: CPT

## 2023-11-12 PROCEDURE — 97116 GAIT TRAINING THERAPY: CPT

## 2023-11-12 PROCEDURE — 80048 BASIC METABOLIC PNL TOTAL CA: CPT

## 2023-11-12 PROCEDURE — 82962 GLUCOSE BLOOD TEST: CPT

## 2023-11-12 PROCEDURE — 97535 SELF CARE MNGMENT TRAINING: CPT

## 2023-11-12 RX ORDER — METOPROLOL SUCCINATE 25 MG/1
75 TABLET, EXTENDED RELEASE ORAL DAILY
Status: DISCONTINUED | OUTPATIENT
Start: 2023-11-13 | End: 2023-11-14

## 2023-11-12 RX ADMIN — PREDNISONE 20 MG: 20 TABLET ORAL at 10:36

## 2023-11-12 RX ADMIN — SACUBITRIL AND VALSARTAN 1 TABLET: 49; 51 TABLET, FILM COATED ORAL at 10:36

## 2023-11-12 RX ADMIN — EMPAGLIFLOZIN 10 MG: 10 TABLET, FILM COATED ORAL at 10:36

## 2023-11-12 RX ADMIN — METOPROLOL SUCCINATE 50 MG: 25 TABLET, EXTENDED RELEASE ORAL at 10:38

## 2023-11-12 RX ADMIN — COLCHICINE 0.6 MG: 0.6 TABLET, FILM COATED ORAL at 10:35

## 2023-11-12 RX ADMIN — ROSUVASTATIN 20 MG: 20 TABLET, FILM COATED ORAL at 21:11

## 2023-11-12 RX ADMIN — APIXABAN 5 MG: 5 TABLET, FILM COATED ORAL at 10:37

## 2023-11-12 RX ADMIN — APIXABAN 5 MG: 5 TABLET, FILM COATED ORAL at 21:11

## 2023-11-12 RX ADMIN — SODIUM CHLORIDE, PRESERVATIVE FREE 10 ML: 5 INJECTION INTRAVENOUS at 10:38

## 2023-11-12 RX ADMIN — SODIUM CHLORIDE, PRESERVATIVE FREE 10 ML: 5 INJECTION INTRAVENOUS at 21:12

## 2023-11-12 RX ADMIN — SACUBITRIL AND VALSARTAN 1 TABLET: 49; 51 TABLET, FILM COATED ORAL at 21:12

## 2023-11-12 RX ADMIN — ALLOPURINOL 100 MG: 100 TABLET ORAL at 10:37

## 2023-11-12 RX ADMIN — CHOLECALCIFEROL TAB 10 MCG (400 UNIT) 400 UNITS: 10 TAB at 10:37

## 2023-11-12 ASSESSMENT — PAIN SCALES - GENERAL: PAINLEVEL_OUTOF10: 0

## 2023-11-12 NOTE — ED PROVIDER NOTES
\[facility]\"" EMERGENCY DEPT  EMERGENCY DEPARTMENT ENCOUNTER       Pt Name: Johanne Shine. MRN: [id number]  Birthdate 1960  Date of evaluation: 11/11/2023  Provider: Mayte Ernst DO   PCP: Penny Stephens MD  Note Started: 9:34 PM EST 11/11/23     CHIEF COMPLAINT       Chief Complaint   Patient presents with    Dizziness     Patient reports getting dizzy upon standing to the point of near syncope. Patient reports seen for the same thing last week    Knee Pain     Right knee since Monday, hx gout        HISTORY OF PRESENT ILLNESS: 1 or more elements      History From: patient, History limited by: none     Xochilt Meade Sr. is a 61 y.o. male presents to the by EMS for right knee pain and near syncopal event. Please See MDM for Additional Details of the HPI/PMH  Nursing Notes were all reviewed and agreed with or any disagreements were addressed in the HPI. REVIEW OF SYSTEMS        Positives and Pertinent negatives as per HPI. PAST HISTORY     Past Medical History:  Past Medical History:   Diagnosis Date    Atrial fibrillation ([address])     Diabetes mellitus ([address])     Hypertension        Past Surgical History:  Past Surgical History:   Procedure Laterality Date    CERVICAL FUSION         Family History:  Family History   Problem Relation Age of Onset    Cancer Mother     Diabetes Father     Dementia Sister        Social History:  Social History     Tobacco Use    Smoking status: Never     Passive exposure: Never    Smokeless tobacco: Never   Substance Use Topics    Alcohol use: Never    Drug use: Never       Allergies:   Allergies   Allergen Reactions    Sulfa Antibiotics        CURRENT MEDICATIONS      Previous Medications    ALLOPURINOL (ZYLOPRIM) 100 MG TABLET    Take 1 tablet by mouth daily    APIXABAN (ELIQUIS) 5 MG TABS TABLET    Take 1 tablet by mouth 2 times daily    FLUTICASONE (FLONASE) 50 MCG/ACT NASAL SPRAY    1 spray by Each Nostril route daily    FREESTYLE LITE STRIP    Inject 1 each into the skin in

## 2023-11-12 NOTE — PROGRESS NOTES
Physical Therapy     Orders received, chart reviewed and patient evaluated by physical therapy. Pending progression with skilled acute physical therapy, recommend:  Therapy 3 hours/day 5-7 days/week    Recommend with nursing OOB to chair 3x/day and walking daily with x1 assist and rolling walker. Thank you for completing as able in order to maintain patient strength, endurance and independence. Full evaluation to follow.       Baron Gotti PT, DPT, NCS

## 2023-11-12 NOTE — PROGRESS NOTES
..End of Shift Note    Bedside shift change report given to Tasha Vallejo RN (oncoming nurse) by Bassem Warner RN (offgoing nurse). Report included the following information SBAR, Kardex, Intake/Output, MAR, and Recent Results    Shift worked:  4531-9022     Shift summary and any significant changes:     Pt given prescribed meds per MAR. No insulin coverage needed. Pt worked with PT today. Provider notified about heart rate jumping to 130-150bpm upon ambulation. Wife present at bedside. Caring rounds completed.      Concerns for physician to address:  none     Zone phone for oncoming shift:   Devang Gonzalez RN

## 2023-11-12 NOTE — CARE COORDINATION
Care Management Initial Assessment       RUR: Observation   Readmission? Yes - D/C on 10/27/23 Readmit on 11/11/23  1st IM letter given? No  1st  letter given: No           11/12/23 0903   Service Assessment   Patient Orientation Alert and Oriented   Cognition Alert   History Provided By Patient   Primary Caregiver Self   Support Systems Spouse/Significant Other;Children;Family Members   Patient's Healthcare Decision Maker is: Legal Next of Kin   PCP Verified by CM Yes   Last Visit to PCP Within last 3 months   Prior Functional Level Independent in ADLs/IADLs   Current Functional Level Assistance with the following:;Mobility   Can patient return to prior living arrangement Yes   Family able to assist with home care needs: Yes   Would you like for me to discuss the discharge plan with any other family members/significant others, and if so, who?  Yes   Financial Resources Other (Comment)   Community Resources None   Social/Functional History   Lives With Spouse   Type of [address]   Active  Yes   Discharge Planning   Type of Residence House   Current Services Prior To Admission Home Care   Patient expects to be discharged to: House     Readmission Assessment  Number of Days since last admission?: 8-30 days  Previous Disposition: Home with Home Health  Who is being Interviewed: Patient  What was the patient's/caregiver's perception as to why they think they needed to return back to the hospital?: Other (Comment) (GOUT/Pain/no strength in leg)  Did you visit your Primary Care Physician after you left the hospital, before you returned this time?: No  Why weren't you able to visit your PCP?: Other (Comment) (Pt was admitted before appointment)  Did you see a specialist, such as Cardiac, Pulmonary, Orthopedic Physician, etc. after you left the hospital?: Yes  Who advised the patient to return to the hospital?: Self-referral  Does the patient report anything that got in the way

## 2023-11-12 NOTE — H&P
Helical thin section chest CT following uneventful intravenous administration of nonionic contrast according to departmental PE protocol. Coronal and sagittal reformats were performed. 3D/MIP post processing was performed. CT dose reduction was achieved through use of a standardized protocol tailored for this examination and automatic exposure control for dose modulation. Adaptive statistical iterative reconstruction (ASIR) was utilized. FINDINGS: This is a good quality study for the evaluation of pulmonary embolism to the first subsegmental arterial level. There is no pulmonary embolism to this level. The visualized thyroid gland is unremarkable. The aorta is normal in caliber. 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 The main pulmonary artery is dilated measuring 4.1 cm in diameter in keeping with pulmonary artery hypertension. There is moderate cardiomegaly. No pericardial effusion. No lymphadenopathy by imaging size criteria. There is mild bilateral lower lung atelectasis. There is no lung mass or consolidation. No pleural effusion or pneumothorax. Central airways are unremarkable. Limited images of the upper abdomen are within normal limits. There are degenerative changes in the spine without aggressive bony lesion. 1. No acute pulmonary artery embolism. 2. Moderate cardiomegaly and pulmonary artery hypertension. XR CHEST PORTABLE    Result Date: 10/23/2023  EXAM: XR CHEST PORTABLE INDICATION: cough COMPARISON: 6/11/2023 TECHNIQUE: Portable erect view of the chest. FINDINGS: Lungs are clear. There is persistent enlargement of the cardiac silhouette. No acute bony abnormalities. No acute cardiopulmonary abnormalities. Persistent enlargement of the cardiac silhouette. US ABDOMEN LIMITED Specify organ? GALLBLADDER, LIVER    Result Date: 10/23/2023  Limited ULTRASOUND OF THE RIGHT UPPER QUADRANT INDICATION: elevated bilirubin COMPARISON: None.  TECHNIQUE: Limited

## 2023-11-12 NOTE — CONSULTS
Orthopedic CONSULT NOTE    Subjective:     Date of Consultation:  November 12, 2023      Baldomero Wallace is a 61 y.o. male who is being seen for right knee pain. No Injury . occurred  few months ago while Pt. was having a gout attack. Workup has revealed gouty flare right knee with mild effusion. Has been taking gout meds. Had knee injected 1 week ago during last admission with little relief. Having difficulty with ambulation. Pmh significant for DM . Has not tried oral steroids. Patient Active Problem List    Diagnosis Date Noted    Knee pain 11/11/2023    Gout of right knee 10/25/2023    Chronic heart failure with preserved ejection fraction ([address]) 08/02/2023    Class 3 severe obesity due to excess calories in adult [facility]) 07/07/2023    Type 2 diabetes mellitus with hyperglycemia, with long-term current use of insulin ([address]) 06/13/2023    Atrial fibrillation ([address]) 06/13/2023    Essential hypertension 06/13/2023    Peripheral edema 06/13/2023    Wheezing 06/13/2023    Dyspnea on exertion 06/11/2023     Family History   Problem Relation Age of Onset    Cancer Mother     Diabetes Father     Dementia Sister       Social History     Tobacco Use    Smoking status: Never     Passive exposure: Never    Smokeless tobacco: Never   Substance Use Topics    Alcohol use: Never     Past Medical History:   Diagnosis Date    Atrial fibrillation ([address])     Diabetes mellitus ([address])     Hypertension       Past Surgical History:   Procedure Laterality Date    CERVICAL FUSION        Prior to Admission medications    Medication Sig Start Date End Date Taking?  Authorizing Provider   fluticasone (FLONASE) 50 MCG/ACT nasal spray 1 spray by Each Nostril route daily 10/28/23   KANDI Vaughn - NP   sacubitril-valsartan [facility]) 49-51 MG per tablet Take 1 tablet by mouth 2 times daily 8/2/23   Noe Flores MD   JARDIANCE 10 MG tablet Take 1 tablet by mouth daily 7/12/23   Provider, Historical, MD   FREESTYLE LITE strip Inject

## 2023-11-12 NOTE — ED NOTES
Report given to Dimas Covarrubias RN at this time.  Pt on monitor x3     Prabhjot Kilgore RN  11/11/23 1942

## 2023-11-13 LAB
GLUCOSE BLD STRIP.AUTO-MCNC: 121 MG/DL (ref 65–117)
GLUCOSE BLD STRIP.AUTO-MCNC: 121 MG/DL (ref 65–117)
GLUCOSE BLD STRIP.AUTO-MCNC: 126 MG/DL (ref 65–117)
GLUCOSE BLD STRIP.AUTO-MCNC: 177 MG/DL (ref 65–117)
SERVICE CMNT-IMP: ABNORMAL

## 2023-11-13 PROCEDURE — 97535 SELF CARE MNGMENT TRAINING: CPT

## 2023-11-13 PROCEDURE — 6370000000 HC RX 637 (ALT 250 FOR IP): Performed by: INTERNAL MEDICINE

## 2023-11-13 PROCEDURE — 82962 GLUCOSE BLOOD TEST: CPT

## 2023-11-13 PROCEDURE — G0378 HOSPITAL OBSERVATION PER HR: HCPCS

## 2023-11-13 PROCEDURE — 2580000003 HC RX 258: Performed by: INTERNAL MEDICINE

## 2023-11-13 RX ORDER — METOPROLOL SUCCINATE 50 MG/1
75 TABLET, EXTENDED RELEASE ORAL DAILY
Qty: 90 TABLET | Refills: 3 | Status: SHIPPED
Start: 2023-11-13 | End: 2023-11-17

## 2023-11-13 RX ORDER — OXYCODONE HYDROCHLORIDE 5 MG/1
5 TABLET ORAL EVERY 8 HOURS PRN
Qty: 9 TABLET | Refills: 0 | Status: SHIPPED | OUTPATIENT
Start: 2023-11-13 | End: 2023-11-14 | Stop reason: SDUPTHER

## 2023-11-13 RX ORDER — COLCHICINE 0.6 MG/1
0.6 TABLET ORAL DAILY
Qty: 30 TABLET | Refills: 3 | Status: SHIPPED | OUTPATIENT
Start: 2023-11-14 | End: 2023-11-14 | Stop reason: SDUPTHER

## 2023-11-13 RX ADMIN — CHOLECALCIFEROL TAB 10 MCG (400 UNIT) 400 UNITS: 10 TAB at 08:56

## 2023-11-13 RX ADMIN — ACETAMINOPHEN 650 MG: 325 TABLET ORAL at 22:00

## 2023-11-13 RX ADMIN — OXYCODONE 5 MG: 5 TABLET ORAL at 08:57

## 2023-11-13 RX ADMIN — SODIUM CHLORIDE, PRESERVATIVE FREE 10 ML: 5 INJECTION INTRAVENOUS at 21:59

## 2023-11-13 RX ADMIN — METOPROLOL SUCCINATE 75 MG: 25 TABLET, EXTENDED RELEASE ORAL at 08:56

## 2023-11-13 RX ADMIN — ACETAMINOPHEN 650 MG: 325 TABLET ORAL at 16:01

## 2023-11-13 RX ADMIN — OXYCODONE 5 MG: 5 TABLET ORAL at 00:13

## 2023-11-13 RX ADMIN — ALLOPURINOL 100 MG: 100 TABLET ORAL at 08:57

## 2023-11-13 RX ADMIN — COLCHICINE 0.6 MG: 0.6 TABLET, FILM COATED ORAL at 08:56

## 2023-11-13 RX ADMIN — ROSUVASTATIN 20 MG: 20 TABLET, FILM COATED ORAL at 22:00

## 2023-11-13 RX ADMIN — APIXABAN 5 MG: 5 TABLET, FILM COATED ORAL at 08:58

## 2023-11-13 RX ADMIN — SACUBITRIL AND VALSARTAN 1 TABLET: 49; 51 TABLET, FILM COATED ORAL at 08:56

## 2023-11-13 RX ADMIN — SACUBITRIL AND VALSARTAN 1 TABLET: 49; 51 TABLET, FILM COATED ORAL at 21:59

## 2023-11-13 RX ADMIN — APIXABAN 5 MG: 5 TABLET, FILM COATED ORAL at 21:59

## 2023-11-13 RX ADMIN — EMPAGLIFLOZIN 10 MG: 10 TABLET, FILM COATED ORAL at 08:57

## 2023-11-13 RX ADMIN — SODIUM CHLORIDE, PRESERVATIVE FREE 10 ML: 5 INJECTION INTRAVENOUS at 08:58

## 2023-11-13 ASSESSMENT — PAIN SCALES - GENERAL
PAINLEVEL_OUTOF10: 4
PAINLEVEL_OUTOF10: 7
PAINLEVEL_OUTOF10: 2
PAINLEVEL_OUTOF10: 3
PAINLEVEL_OUTOF10: 4

## 2023-11-13 ASSESSMENT — PAIN DESCRIPTION - ORIENTATION
ORIENTATION: RIGHT

## 2023-11-13 ASSESSMENT — PAIN DESCRIPTION - DESCRIPTORS
DESCRIPTORS: ACHING
DESCRIPTORS: ACHING

## 2023-11-13 ASSESSMENT — PAIN DESCRIPTION - LOCATION
LOCATION: KNEE

## 2023-11-13 NOTE — DISCHARGE INSTRUCTIONS
DISCHARGE DIAGNOSIS:  Ambulatory dysfunction due to gout flare   DM2   Afib   HTN   ? CHF     MEDICATIONS:  It is important that you take the medication exactly as they are prescribed. Keep your medication in the bottles provided by the pharmacist and keep a list of the medication names, dosages, and times to be taken in your wallet. Do not take other medications without consulting your doctor. Pain Management: per above medications    What to do at Home    Recommended diet:  cardiac diet    Recommended activity: activity as tolerated    If you have questions regarding the hospital related prescriptions or hospital related issues please call [facility] at . You can always direct your questions to your primary care doctor if you are unable to reach your hospital physician; your PCP works as an extension of your hospital doctor just like your hospital doctor is an extension of your PCP for your time at the hospital [facility]).     If you experience any of the following symptoms then please call your primary care physician or return to the emergency room if you cannot get hold of your doctor:  Fever, chills, nausea, vomiting, diarrhea, change in mentation, falling, bleeding, shortness of breath,

## 2023-11-13 NOTE — PROGRESS NOTES
Nutrition Note    Chart reviewed for MST trigger indicating no decrease in appetite with >34# weight loss PTA. Review of weight hx reveals gradual weight loss over the last several months, not significant for the time frame. Nutrition assessment not indicated at this time. RD available by consult if needed, otherwise will re-evaluate per length of stay policy.      Wt Readings from Last 10 Encounters:   10/11/23 (!) 150.6 kg (332 lb)   09/07/23 (!) 150.6 kg (332 lb)   08/17/23 (!) 153.8 kg (339 lb)   08/02/23 (!) 156.9 kg (346 lb)   07/07/23 (!) 156.9 kg (346 lb)   06/14/23 (!) 157.9 kg (348 lb 1.7 oz)   06/14/23 (!) 157.9 kg (348 lb 1 oz)   ]    Electronically signed by Maricruz Johnson RD on 11/13/23 at 1:04 PM EST    Contact:

## 2023-11-14 VITALS
OXYGEN SATURATION: 98 % | TEMPERATURE: 97.5 F | DIASTOLIC BLOOD PRESSURE: 75 MMHG | RESPIRATION RATE: 16 BRPM | HEART RATE: 50 BPM | SYSTOLIC BLOOD PRESSURE: 124 MMHG

## 2023-11-14 PROBLEM — R26.2 AMBULATORY DYSFUNCTION: Status: ACTIVE | Noted: 2023-11-14

## 2023-11-14 LAB
EKG ATRIAL RATE: 136 BPM
EKG DIAGNOSIS: NORMAL
EKG P AXIS: -10 DEGREES
EKG P-R INTERVAL: 130 MS
EKG Q-T INTERVAL: 330 MS
EKG QRS DURATION: 78 MS
EKG QTC CALCULATION (BAZETT): 483 MS
EKG R AXIS: -31 DEGREES
EKG T AXIS: 52 DEGREES
EKG VENTRICULAR RATE: 129 BPM
GLUCOSE BLD STRIP.AUTO-MCNC: 124 MG/DL (ref 65–117)
GLUCOSE BLD STRIP.AUTO-MCNC: 129 MG/DL (ref 65–117)
SERVICE CMNT-IMP: ABNORMAL
SERVICE CMNT-IMP: ABNORMAL

## 2023-11-14 PROCEDURE — G0378 HOSPITAL OBSERVATION PER HR: HCPCS

## 2023-11-14 PROCEDURE — 97530 THERAPEUTIC ACTIVITIES: CPT | Performed by: PHYSICAL THERAPIST

## 2023-11-14 PROCEDURE — 97116 GAIT TRAINING THERAPY: CPT | Performed by: PHYSICAL THERAPIST

## 2023-11-14 PROCEDURE — 6370000000 HC RX 637 (ALT 250 FOR IP): Performed by: INTERNAL MEDICINE

## 2023-11-14 PROCEDURE — 82962 GLUCOSE BLOOD TEST: CPT

## 2023-11-14 PROCEDURE — 97535 SELF CARE MNGMENT TRAINING: CPT

## 2023-11-14 PROCEDURE — 1100000000 HC RM PRIVATE

## 2023-11-14 PROCEDURE — 2580000003 HC RX 258: Performed by: INTERNAL MEDICINE

## 2023-11-14 RX ORDER — COLCHICINE 0.6 MG/1
0.6 TABLET ORAL DAILY
Qty: 10 TABLET | Refills: 0 | Status: SHIPPED | OUTPATIENT
Start: 2023-11-14 | End: 2023-11-14 | Stop reason: SDUPTHER

## 2023-11-14 RX ORDER — COLCHICINE 0.6 MG/1
0.6 TABLET ORAL DAILY
Qty: 10 TABLET | Refills: 0 | Status: SHIPPED | OUTPATIENT
Start: 2023-11-14 | End: 2023-11-24

## 2023-11-14 RX ORDER — METOPROLOL SUCCINATE 25 MG/1
25 TABLET, EXTENDED RELEASE ORAL DAILY
Status: DISCONTINUED | OUTPATIENT
Start: 2023-11-15 | End: 2023-11-14 | Stop reason: HOSPADM

## 2023-11-14 RX ORDER — OXYCODONE HYDROCHLORIDE 5 MG/1
5 TABLET ORAL EVERY 8 HOURS PRN
Qty: 15 TABLET | Refills: 0 | Status: SHIPPED | OUTPATIENT
Start: 2023-11-14 | End: 2023-11-19

## 2023-11-14 RX ADMIN — COLCHICINE 0.6 MG: 0.6 TABLET, FILM COATED ORAL at 09:27

## 2023-11-14 RX ADMIN — ACETAMINOPHEN 650 MG: 325 TABLET ORAL at 09:27

## 2023-11-14 RX ADMIN — ALLOPURINOL 100 MG: 100 TABLET ORAL at 09:16

## 2023-11-14 RX ADMIN — EMPAGLIFLOZIN 10 MG: 10 TABLET, FILM COATED ORAL at 09:16

## 2023-11-14 RX ADMIN — SODIUM CHLORIDE, PRESERVATIVE FREE 10 ML: 5 INJECTION INTRAVENOUS at 09:17

## 2023-11-14 RX ADMIN — CHOLECALCIFEROL TAB 10 MCG (400 UNIT) 400 UNITS: 10 TAB at 09:16

## 2023-11-14 RX ADMIN — OXYCODONE 5 MG: 5 TABLET ORAL at 04:28

## 2023-11-14 RX ADMIN — ACETAMINOPHEN 650 MG: 325 TABLET ORAL at 15:09

## 2023-11-14 RX ADMIN — SACUBITRIL AND VALSARTAN 1 TABLET: 49; 51 TABLET, FILM COATED ORAL at 09:16

## 2023-11-14 RX ADMIN — APIXABAN 5 MG: 5 TABLET, FILM COATED ORAL at 09:16

## 2023-11-14 ASSESSMENT — PAIN DESCRIPTION - LOCATION: LOCATION: KNEE

## 2023-11-14 ASSESSMENT — PAIN SCALES - GENERAL
PAINLEVEL_OUTOF10: 3
PAINLEVEL_OUTOF10: 4
PAINLEVEL_OUTOF10: 3
PAINLEVEL_OUTOF10: 0

## 2023-11-14 ASSESSMENT — PAIN DESCRIPTION - DESCRIPTORS: DESCRIPTORS: ACHING

## 2023-11-14 ASSESSMENT — PAIN DESCRIPTION - ORIENTATION: ORIENTATION: RIGHT

## 2023-11-14 NOTE — CARE COORDINATION
11/14/23 [address] Discharge   Transition of Care Consult (CM Consult) Jaleel Vick   [facility] Home Health No   Reason Outside Agency Chosen Patient already serviced by other home [address] Provided? No   Mode of Transport at Discharge Other (see comment)  (wife to transport home via car)   Hospital Transport Time of Discharge 1700   Confirm Follow Up Transport Family  (wife to transport)   Condition of Participation: Discharge Planning   The Plan for Transition of Care is related to the following treatment goals: patient to go home with pain medication, follow with PCP and home health providing therapy   The Patient and/or Patient Representative was provided with a Choice of Provider? Patient   The Patient and/Or Patient Representative agree with the Discharge Plan? Yes   Freedom of Choice list was provided with basic dialogue that supports the patient's individualized plan of care/goals, treatment preferences, and shares the quality data associated with the providers? No  (patient had another agency that had been ordered to see him prior to admission)         Transition of Care Plan:    RUR: na, OBS pt  Prior Level of Functioning: reported to be independent  Disposition: therapy recommending inpatient rehab--[facility] Denied after medical review. Patient agreed to same home health as prior referral --At [facility] [phone]  If SNF or IPR: Date FOC offered: 11/13  Date FOC received: [facility] is considering acceptance, called facility and asked to review latest therapy notes to see if it is what is needed. Denied. Accepting facility: see above  Date authorization started with reference number: not started  Date authorization received and expires:    Follow up appointments: after dc from [facility]  DME needed: none at this time  Transportation at discharge: BLS transport, most likely  IM/IMM

## 2023-11-17 ENCOUNTER — OFFICE VISIT (OUTPATIENT)
Age: 63
End: 2023-11-17
Payer: OTHER GOVERNMENT

## 2023-11-17 VITALS
OXYGEN SATURATION: 98 % | WEIGHT: 311 LBS | DIASTOLIC BLOOD PRESSURE: 80 MMHG | BODY MASS INDEX: 41.22 KG/M2 | HEART RATE: 74 BPM | SYSTOLIC BLOOD PRESSURE: 120 MMHG | HEIGHT: 73 IN | RESPIRATION RATE: 16 BRPM

## 2023-11-17 DIAGNOSIS — I10 HTN (HYPERTENSION), BENIGN: ICD-10-CM

## 2023-11-17 DIAGNOSIS — E66.01 CLASS 3 SEVERE OBESITY DUE TO EXCESS CALORIES WITH BODY MASS INDEX (BMI) OF 45.0 TO 49.9 IN ADULT, UNSPECIFIED WHETHER SERIOUS COMORBIDITY PRESENT (HCC): ICD-10-CM

## 2023-11-17 DIAGNOSIS — G47.31 CENTRAL SLEEP APNEA: ICD-10-CM

## 2023-11-17 DIAGNOSIS — I48.0 PAROXYSMAL ATRIAL FIBRILLATION (HCC): ICD-10-CM

## 2023-11-17 DIAGNOSIS — M10.461: ICD-10-CM

## 2023-11-17 DIAGNOSIS — I50.32 CHRONIC HEART FAILURE WITH PRESERVED EJECTION FRACTION (HCC): Primary | ICD-10-CM

## 2023-11-17 PROCEDURE — 99214 OFFICE O/P EST MOD 30 MIN: CPT | Performed by: INTERNAL MEDICINE

## 2023-11-17 PROCEDURE — 3074F SYST BP LT 130 MM HG: CPT | Performed by: INTERNAL MEDICINE

## 2023-11-17 PROCEDURE — 3079F DIAST BP 80-89 MM HG: CPT | Performed by: INTERNAL MEDICINE

## 2023-11-17 RX ORDER — METOPROLOL SUCCINATE 50 MG/1
50 TABLET, EXTENDED RELEASE ORAL DAILY
Qty: 90 TABLET | Refills: 3 | Status: SHIPPED | OUTPATIENT
Start: 2023-11-17

## 2023-11-17 RX ORDER — EMPAGLIFLOZIN 25 MG/1
TABLET, FILM COATED ORAL
COMMUNITY
Start: 2023-10-09

## 2023-11-17 RX ORDER — IBUPROFEN 400 MG/1
400 TABLET ORAL
COMMUNITY
Start: 2016-01-25

## 2023-11-17 RX ORDER — SACUBITRIL AND VALSARTAN 97; 103 MG/1; MG/1
TABLET, FILM COATED ORAL
COMMUNITY
Start: 2023-09-29

## 2023-11-17 RX ORDER — MELATONIN
COMMUNITY
Start: 2023-08-17

## 2023-11-17 RX ORDER — ACETAMINOPHEN 325 MG/1
TABLET ORAL
COMMUNITY
Start: 2023-08-17 | End: 2024-08-19

## 2023-11-17 ASSESSMENT — ENCOUNTER SYMPTOMS
WHEEZING: 0
SHORTNESS OF BREATH: 0
CHEST TIGHTNESS: 0

## 2023-12-06 ENCOUNTER — TELEPHONE (OUTPATIENT)
Age: 63
End: 2023-12-06

## 2023-12-06 DIAGNOSIS — I48.91 ATRIAL FIBRILLATION (HCC): Primary | ICD-10-CM

## 2023-12-06 NOTE — TELEPHONE ENCOUNTER
Pt's home health nurse is calling because patient is currently in Afib. Home health nurse stated that the patient has a hx of Afib and cardioversion. Pt's pulse at exertion is 125,at rest its 62-79. Home health nurse checked patient's pulse manually and it was irregular. Spoke with nurse Terry Cartagena and she stated she will give the patient a call back.     Michelle Kuo [phone] (home health nurse)

## 2023-12-07 ENCOUNTER — TELEPHONE (OUTPATIENT)
Age: 63
End: 2023-12-07

## 2023-12-07 DIAGNOSIS — G47.31 CENTRAL SLEEP APNEA: Primary | ICD-10-CM

## 2023-12-07 NOTE — TELEPHONE ENCOUNTER
----- Message from Ezio Whiting LPN sent at 82/5/0639  3:59 PM EST -----  Regarding: holter  14 day holter for afib per Dr. Hiro Bell.  TY

## 2023-12-07 NOTE — TELEPHONE ENCOUNTER
Spoke with patient after ID x2 to convey per Dr. Tawanna Bell 14 day holter to assess afib burden and referral to EP for ablation eval. Home health Nurse updated with plan as well. Home health nurse first visit with patient with known afib- see plan above. EP referral placed, monitor order placed and continue with Feb fup with Tawanna Bell- Patient will return call if HR remains high, currently <100.  No emedma, weight gain, no SOB a rest.

## 2023-12-07 NOTE — TELEPHONE ENCOUNTER
Pt called and stated he would like to get him to see Dr. Aubrey Daniel due to vitals taken by homecare, see previous encounter.        Pt# [phone]

## 2023-12-07 NOTE — TELEPHONE ENCOUNTER
Spoke with patient after ID x2 to convey per Dr. Urszula Lin 14 day holter to assess afib burden and referral to EP for ablation eval. Home health Nurse updated with plan as well.

## 2023-12-07 NOTE — TELEPHONE ENCOUNTER
Spoke with patient who wants to go forward with the Remede implant. Patient stated he has sent in the paperwork to [address] [place] [address] to proceed. Referral to be placed from [address] [place] [address],[address].     Richardo Merlin Perkins,RRT,RPSGT, CSE

## 2023-12-13 NOTE — TELEPHONE ENCOUNTER
Macy Galekiko Teague. (: 1960) last seen by Dr. Mickey Bob on 2023 for evaluation of a sleep disorder. He  has a past medical history of Atrial fibrillation ([address]), Diabetes mellitus ([address]), and Hypertension. Cardiac Echo with cardioversion 23 showed visually estimated EF of 50 - 55%. Dr. Mickey Bob  10/16/2023 discussed with patient results of diagnostic testing (23) and the occurrence of CPAP and Bi-Level PAP failure on testing (10-10-23). Further treatment options at this point to include ASV titration versus Phrenic Nerve Stimulation Therapy   (REMEDE) discussed, offered patient a visit with Ms. Leroy to review Phrenic Nerve Stimulation (Roc Marinelli). He agreed to do so and indicated that he would think about the two options and would let us know after his visit. Jacqueline Singletary with patient who wants to go forward with the Remede implant. Patient stated he has sent in the paperwork to [address] [place] [address] to proceed. Referral to be placed from [facility] by NP while Dr. Mickey Bob is out of office. In lab sleep test 2023 showed AHI of 104.3/hr (187 central, 12 obstructive, 1 mexed, 20 hypopnea) with a lowest SpO2 of 87%, duration of SpO2 < 88% 0 min. Weight at time of sleep testing 332 pounds. Subsequent titration showed CPAP/BiPAP failure with continued central events.       Orders Placed This Encounter   Procedures    Ambulatory referral to Cardiology     Referred to Provider:   Jonathan Martin MD     Number of Visits Requested:   1       KANDI Cifuentes - NP, [facility]  23

## 2023-12-15 ENCOUNTER — CLINICAL DOCUMENTATION (OUTPATIENT)
Age: 63
End: 2023-12-15

## 2023-12-15 NOTE — PROGRESS NOTES
Correct fax number after speaking to [facility] cardiology is [phone]. Referral resent.

## 2024-01-02 ENCOUNTER — CLINICAL DOCUMENTATION (OUTPATIENT)
Facility: HOSPITAL | Age: 64
End: 2024-01-02

## 2024-01-02 NOTE — PROGRESS NOTES
Urgent monitor results reviewed. Pause of 3.6 seconds. Patient asymptomatic. Patient with 100% AF burden. Would continue Toprol 50 mg daily for CHF.

## 2024-01-04 ENCOUNTER — TELEPHONE (OUTPATIENT)
Age: 64
End: 2024-01-04

## 2024-01-04 NOTE — TELEPHONE ENCOUNTER
Spoke to patient and [facility] had called patient did not realize this referral was for the Remede device.       Will reach out to [facility] cardiology and [employer] Remede rep concerning getting patient set up for consultation with Dr. Rico.    Lyudmila Leroy,RRT,RPSGT, CSE

## 2024-01-11 ENCOUNTER — PATIENT MESSAGE (OUTPATIENT)
Age: 64
End: 2024-01-11

## 2024-01-11 DIAGNOSIS — I50.32 CHRONIC HEART FAILURE WITH PRESERVED EJECTION FRACTION (HCC): ICD-10-CM

## 2024-01-15 DIAGNOSIS — I50.32 CHRONIC HEART FAILURE WITH PRESERVED EJECTION FRACTION (HCC): ICD-10-CM

## 2024-01-15 RX ORDER — SACUBITRIL AND VALSARTAN 97; 103 MG/1; MG/1
1 TABLET, FILM COATED ORAL 2 TIMES DAILY
Qty: 180 TABLET | Refills: 3 | Status: SHIPPED | OUTPATIENT
Start: 2024-01-15

## 2024-01-15 RX ORDER — HYDRALAZINE HYDROCHLORIDE 25 MG/1
25 TABLET, FILM COATED ORAL 2 TIMES DAILY PRN
Qty: 180 TABLET | Refills: 3 | Status: SHIPPED | OUTPATIENT
Start: 2024-01-15

## 2024-01-15 RX ORDER — METOPROLOL SUCCINATE 50 MG/1
25 TABLET, EXTENDED RELEASE ORAL DAILY
Qty: 90 TABLET | Refills: 1 | Status: SHIPPED
Start: 2024-01-15

## 2024-01-15 NOTE — TELEPHONE ENCOUNTER
Patient returned call and stated he is actually on  Step3 Entresto already.  Will re-discuss with NP.

## 2024-01-15 NOTE — TELEPHONE ENCOUNTER
From: Stefan Calabrese Sr.  To: Dr. Ramila Patel  Sent: 1/11/2024 7:12 PM EST  Subject: RHughesSr    Good evening!    I have been taking my blood pressure readings for the last few days: today: 165/113, 1/10: 162/116, 1/9: 173/11: 147/106.    Reduced the metoprolol to 25mg from 50mg, per instructions.    What can I do to bring the pressure down?

## 2024-01-15 NOTE — TELEPHONE ENCOUNTER
Per verbal order from Janice Samuels NP  Last appt: 11/17/2023     Future Appointments   Date Time Provider Department Center   2/9/2024  1:00 PM [facility]   2/9/2024  2:40 PM Ramila Patel, MD [facility]   2/27/2024  3:00 PM Brandee Sanchez MD [facility]       Requested Prescriptions     Signed Prescriptions Disp Refills    sacubitril-valsartan (ENTRESTO)  MG per tablet 180 tablet 3     Sig: Take 1 tablet by mouth 2 times daily     Authorizing Provider: RAMILA PATEL     Ordering User: TERRELL ALMARAZ    hydrALAZINE (APRESOLINE) 25 MG tablet 180 tablet 3     Sig: Take 1 tablet by mouth 2 times daily as needed (SBP >160)     Authorizing Provider: RAMILA PATEL     Ordering User: TERRELL ALMARAZ

## 2024-01-15 NOTE — TELEPHONE ENCOUNTER
Spoke with patient after ID x2   Patient reduced the metoprolol to 25mg. Taking his entresto 49-51, Jardiance 25mg.     Notified Janice Samuels NP and we are to increase Entresto to step 3.     Returned call to patient and conveyed plan.  Patient expressed understanding.

## 2024-01-17 ENCOUNTER — TELEPHONE (OUTPATIENT)
Age: 64
End: 2024-01-17

## 2024-01-17 DIAGNOSIS — G47.31 CENTRAL SLEEP APNEA: Primary | ICD-10-CM

## 2024-01-17 NOTE — TELEPHONE ENCOUNTER
Encounter Diagnosis   Name Primary?    Central sleep apnea Yes         Orders Placed This Encounter   Procedures    SLEEP LAB (PAP TITRATION)     Standing Status:   Future     Standing Expiration Date:   1/17/2025     Scheduling Instructions:      Perform ASV Titration using ResTriplePulse Care Tx:      Rate: Auto;      EPAP: 6 cmH2O titrate to treat obstructive apnea.       Maximum PS: 15 cmH2O; Minimum PS: 03 cmH2O.

## 2024-01-17 NOTE — TELEPHONE ENCOUNTER
Spoke with patient who has not heard anything from [facility] concerning Remede device.   Patient would like to move forward with the ASV titration instead of the Remede device.    Please write order for ASV titration.

## 2024-01-17 NOTE — TELEPHONE ENCOUNTER
Patient called to talk to Lyudmila to discuss next steps moving forward. Best call back number is [phone]

## 2024-02-04 ENCOUNTER — HOSPITAL ENCOUNTER (OUTPATIENT)
Facility: HOSPITAL | Age: 64
Discharge: HOME | End: 2024-02-07
Payer: OTHER GOVERNMENT

## 2024-02-04 VITALS
BODY MASS INDEX: 41.22 KG/M2 | HEIGHT: 73 IN | OXYGEN SATURATION: 95 % | TEMPERATURE: 98.2 F | DIASTOLIC BLOOD PRESSURE: 94 MMHG | HEART RATE: 110 BPM | WEIGHT: 311 LBS | SYSTOLIC BLOOD PRESSURE: 145 MMHG

## 2024-02-04 DIAGNOSIS — G47.31 CENTRAL SLEEP APNEA: ICD-10-CM

## 2024-02-04 PROCEDURE — 95811 POLYSOM 6/>YRS CPAP 4/> PARM: CPT | Performed by: INTERNAL MEDICINE

## 2024-02-05 ENCOUNTER — TELEPHONE (OUTPATIENT)
Age: 64
End: 2024-02-05

## 2024-02-05 DIAGNOSIS — G47.31 CENTRAL SLEEP APNEA: Primary | ICD-10-CM

## 2024-02-05 NOTE — PROGRESS NOTES
Sleep Study Technical Notes   Disclaimer:  all notes have not been confirmed by interpreting physician      PRE-Test:  Stefan Calabrese SrTamie (: 1960) arrived in the lobby. The patient was taken to the Sleep Center and taken directly to his/her room.   Procedure explained to the patient and questions were answered. The patient expressed understanding of the procedure. Electrodes were applied without incident. The patient was placed in bed and the study was started.    Acquisition Notes:  Lights off: 10:16 pm    Respiratory events:   A__N  H__Y  C__Y  M__N  ECG:    Significant arrhythmias:  N  Snoring:  Mild   Sleep Stages:  REM  Y    Titration type:ASV  PAP titration information in study flow sheet if applicable  Positional therapy with:   Patient slept with positional therapy:  N  Patient slept with head of bed elevated:  Yes at 15 degree angle  Supine sleep assessed per physician order:  No  Patient wore an oral appliance:  N  Other comments:   Patient had caregiver in attendance:  N  Patient watched TV or on phone after lights out for <1.5 hours  Patient to bathroom 3 - times- Pt brought urinal home from home        POST Test:  Patient was awakened.  Electrodes were removed.    The patient was discharged after answering the Post Questionnaire.    Equipment and room cleaned per infection control policy.

## 2024-02-09 ENCOUNTER — CLINICAL DOCUMENTATION (OUTPATIENT)
Age: 64
End: 2024-02-09

## 2024-02-09 ENCOUNTER — OFFICE VISIT (OUTPATIENT)
Age: 64
End: 2024-02-09
Payer: OTHER GOVERNMENT

## 2024-02-09 ENCOUNTER — ANCILLARY PROCEDURE (OUTPATIENT)
Age: 64
End: 2024-02-09
Payer: OTHER GOVERNMENT

## 2024-02-09 VITALS
WEIGHT: 315 LBS | HEIGHT: 73 IN | RESPIRATION RATE: 16 BRPM | DIASTOLIC BLOOD PRESSURE: 100 MMHG | HEART RATE: 82 BPM | BODY MASS INDEX: 41.75 KG/M2 | OXYGEN SATURATION: 98 % | SYSTOLIC BLOOD PRESSURE: 160 MMHG

## 2024-02-09 VITALS
WEIGHT: 315 LBS | HEIGHT: 73 IN | BODY MASS INDEX: 41.75 KG/M2 | DIASTOLIC BLOOD PRESSURE: 94 MMHG | SYSTOLIC BLOOD PRESSURE: 145 MMHG

## 2024-02-09 DIAGNOSIS — I48.91 ATRIAL FIBRILLATION STATUS POST CARDIOVERSION (HCC): ICD-10-CM

## 2024-02-09 DIAGNOSIS — E11.65 TYPE 2 DIABETES MELLITUS WITH HYPERGLYCEMIA, WITH LONG-TERM CURRENT USE OF INSULIN (HCC): ICD-10-CM

## 2024-02-09 DIAGNOSIS — G47.31 CENTRAL SLEEP APNEA: ICD-10-CM

## 2024-02-09 DIAGNOSIS — M10.461: ICD-10-CM

## 2024-02-09 DIAGNOSIS — Z79.4 TYPE 2 DIABETES MELLITUS WITH HYPERGLYCEMIA, WITH LONG-TERM CURRENT USE OF INSULIN (HCC): ICD-10-CM

## 2024-02-09 DIAGNOSIS — I50.32 CHRONIC HEART FAILURE WITH PRESERVED EJECTION FRACTION (HCC): ICD-10-CM

## 2024-02-09 DIAGNOSIS — I10 HTN (HYPERTENSION), BENIGN: ICD-10-CM

## 2024-02-09 DIAGNOSIS — E66.01 CLASS 3 SEVERE OBESITY DUE TO EXCESS CALORIES WITH BODY MASS INDEX (BMI) OF 45.0 TO 49.9 IN ADULT, UNSPECIFIED WHETHER SERIOUS COMORBIDITY PRESENT (HCC): ICD-10-CM

## 2024-02-09 DIAGNOSIS — I50.32 CHRONIC HEART FAILURE WITH PRESERVED EJECTION FRACTION (HCC): Primary | ICD-10-CM

## 2024-02-09 DIAGNOSIS — I48.0 PAROXYSMAL ATRIAL FIBRILLATION (HCC): ICD-10-CM

## 2024-02-09 LAB
ECHO AO ASC DIAM: 3.4 CM
ECHO AO ASCENDING AORTA INDEX: 1.29 CM/M2
ECHO AO ROOT DIAM: 3.9 CM
ECHO AO ROOT INDEX: 1.48 CM/M2
ECHO AV AREA PEAK VELOCITY: 3.7 CM2
ECHO AV AREA VTI: 3.6 CM2
ECHO AV AREA/BSA PEAK VELOCITY: 1.4 CM2/M2
ECHO AV AREA/BSA VTI: 1.4 CM2/M2
ECHO AV MEAN GRADIENT: 4 MMHG
ECHO AV MEAN VELOCITY: 0.9 M/S
ECHO AV PEAK GRADIENT: 7 MMHG
ECHO AV PEAK VELOCITY: 1.4 M/S
ECHO AV VELOCITY RATIO: 0.79
ECHO AV VTI: 23.8 CM
ECHO BSA: 2.75 M2
ECHO LA DIAMETER INDEX: 1.63 CM/M2
ECHO LA DIAMETER: 4.3 CM
ECHO LA TO AORTIC ROOT RATIO: 1.1
ECHO LA VOL A-L A2C: 85 ML (ref 18–58)
ECHO LA VOL A-L A4C: 72 ML (ref 18–58)
ECHO LA VOL BP: 77 ML (ref 18–58)
ECHO LA VOL MOD A2C: 83 ML (ref 18–58)
ECHO LA VOL MOD A4C: 70 ML (ref 18–58)
ECHO LA VOL/BSA BIPLANE: 29 ML/M2 (ref 16–34)
ECHO LA VOLUME AREA LENGTH: 80 ML
ECHO LA VOLUME INDEX A-L A2C: 32 ML/M2 (ref 16–34)
ECHO LA VOLUME INDEX A-L A4C: 27 ML/M2 (ref 16–34)
ECHO LA VOLUME INDEX AREA LENGTH: 30 ML/M2 (ref 16–34)
ECHO LA VOLUME INDEX MOD A2C: 31 ML/M2 (ref 16–34)
ECHO LA VOLUME INDEX MOD A4C: 27 ML/M2 (ref 16–34)
ECHO LV E' LATERAL VELOCITY: 14 CM/S
ECHO LV E' SEPTAL VELOCITY: 13 CM/S
ECHO LV FRACTIONAL SHORTENING: 48 % (ref 28–44)
ECHO LV INTERNAL DIMENSION DIASTOLE INDEX: 1.52 CM/M2
ECHO LV INTERNAL DIMENSION DIASTOLIC: 4 CM (ref 4.2–5.9)
ECHO LV INTERNAL DIMENSION SYSTOLIC INDEX: 0.8 CM/M2
ECHO LV INTERNAL DIMENSION SYSTOLIC: 2.1 CM
ECHO LV IVSD: 1.7 CM (ref 0.6–1)
ECHO LV MASS 2D: 284.5 G (ref 88–224)
ECHO LV MASS INDEX 2D: 107.8 G/M2 (ref 49–115)
ECHO LV POSTERIOR WALL DIASTOLIC: 1.7 CM (ref 0.6–1)
ECHO LV RELATIVE WALL THICKNESS RATIO: 0.85
ECHO LVOT AREA: 4.5 CM2
ECHO LVOT AV VTI INDEX: 0.76
ECHO LVOT DIAM: 2.4 CM
ECHO LVOT MEAN GRADIENT: 2 MMHG
ECHO LVOT PEAK GRADIENT: 5 MMHG
ECHO LVOT PEAK VELOCITY: 1.1 M/S
ECHO LVOT STROKE VOLUME INDEX: 31.2 ML/M2
ECHO LVOT SV: 82.3 ML
ECHO LVOT VTI: 18.2 CM
ECHO MV A VELOCITY: 0.46 M/S
ECHO MV E DECELERATION TIME (DT): 154.2 MS
ECHO MV E VELOCITY: 0.97 M/S
ECHO MV E/A RATIO: 2.11
ECHO MV E/E' LATERAL: 6.93
ECHO MV E/E' RATIO (AVERAGED): 7.2
ECHO RV FREE WALL PEAK S': 21 CM/S
ECHO RV TAPSE: 3.1 CM (ref 1.7–?)

## 2024-02-09 PROCEDURE — 93010 ELECTROCARDIOGRAM REPORT: CPT | Performed by: INTERNAL MEDICINE

## 2024-02-09 PROCEDURE — 99215 OFFICE O/P EST HI 40 MIN: CPT | Performed by: INTERNAL MEDICINE

## 2024-02-09 PROCEDURE — 3080F DIAST BP >= 90 MM HG: CPT | Performed by: INTERNAL MEDICINE

## 2024-02-09 PROCEDURE — 93306 TTE W/DOPPLER COMPLETE: CPT | Performed by: INTERNAL MEDICINE

## 2024-02-09 PROCEDURE — 93005 ELECTROCARDIOGRAM TRACING: CPT | Performed by: INTERNAL MEDICINE

## 2024-02-09 PROCEDURE — 3077F SYST BP >= 140 MM HG: CPT | Performed by: INTERNAL MEDICINE

## 2024-02-09 RX ORDER — NIFEDIPINE 60 MG/1
60 TABLET, EXTENDED RELEASE ORAL DAILY
Qty: 90 TABLET | Refills: 3 | Status: SHIPPED | OUTPATIENT
Start: 2024-02-09

## 2024-02-09 RX ORDER — METOPROLOL SUCCINATE 50 MG/1
50 TABLET, EXTENDED RELEASE ORAL DAILY
Qty: 90 TABLET | Refills: 3 | Status: SHIPPED | OUTPATIENT
Start: 2024-02-09

## 2024-02-09 ASSESSMENT — PATIENT HEALTH QUESTIONNAIRE - PHQ9
SUM OF ALL RESPONSES TO PHQ QUESTIONS 1-9: 0
SUM OF ALL RESPONSES TO PHQ QUESTIONS 1-9: 0
2. FEELING DOWN, DEPRESSED OR HOPELESS: 0
1. LITTLE INTEREST OR PLEASURE IN DOING THINGS: 0
SUM OF ALL RESPONSES TO PHQ9 QUESTIONS 1 & 2: 0
SUM OF ALL RESPONSES TO PHQ QUESTIONS 1-9: 0
SUM OF ALL RESPONSES TO PHQ QUESTIONS 1-9: 0

## 2024-02-09 NOTE — TELEPHONE ENCOUNTER
Polysomnography with PAP titration interpreted, device ordered.    Encounter Diagnosis   Name Primary?    Central sleep apnea Yes         Orders Placed This Encounter   Procedures    DME Order for (Specify) as OP     - DME device ordered - ResMed ASV Device with Heated Humidifer / .   - Diagnosis: Complex Sleep Apnea G47.31  - Length of Need: Lifetime      ResMed AirCurve 10 ASV:     Min EPAP:  06 cmH2O / Max EPAP: 09 cmH2O;  Min PS:       03 cmH2O / Max PS:     15 cmH2O;  Back up Rate: Auto;    PAP mask -  As fitted during titration OR patient preference, headgear, tubing, and filter;  heated humidifier; wireless modem. Remote monitoring enrollment.    Nurys Leggett MD, FAASM; NPI: [id number]  Electronically signed.05/13/23      Nurys Leggett MD, FAASM; NPI: [id number]  Electronically signed. 2/9/2024

## 2024-02-09 NOTE — PROGRESS NOTES
Patient: Stefan Calabrese Sr.  : 1960    Primary Cardiologist: Ramila Patel MD  EP Cardiologist:  PCP: Romeo Sommer MD    Today's Date: 2024    Echo today shows normal LVEF, severe LVH.  AFIB.  Adjusted BP meds, discussed TOMMY management and weight loss.  Plans for EP appt with Dr. Sanchez later this month to discuss ablation.    ASSESSMENT AND PLAN:     Assessment and Plan:  AFIB  Admission 2023 [facility]  AFIb with RVR  A/C HFrEF 45-50%, also component of HFpEF, LVH  Sp CASEY+ DCCV  Recurrent  Dr. Sanchez appt 24  3.6 pause on monitor, sleeping hours in setting of TOMMY    2. HTN  Entresto step 3  Nifedipine 50  Toprol XL 50    3. TOMMY  Central severe TOMMY - not a candidate of INSPIRE, Plans for Phrenic Nerve Stimulation (REMEDE) - not pursuing at this time.   Dr. Leggett.    New machine, compliant.    4.  Gout  Stable   Off colchine  Continue allopurinol 150mg po QD    5.  Weight loss      Follow up 3 months.    No diagnosis found.    HISTORY OF PRESENT ILLNESS:     History of Present Illness:  Stefan Calabrese Sr. is a 63 y.o. male here for FU.    Admission [facility] 2023.     AFIB/HFpEF - plan for CASEY + DCCV 2023.  Eliquis started.  CCB changed to BB.  BP control.  IV lasix, transition to PO upon discharge.  Outpatient TOMMY screening.  DM2.     PMH of HTN (telmisartan-HCTZ and diltiazem CD), morbid obesity, pre-diabetes, recent pneumonia, who presents with chief c/o of SOB and wheezing.  Reports he has been SOB for past few months since he was diagnosed with pneumonia 3 months ago.  Reports some BLE edema.  No chest pain reported.  He reports he had a sleep study over 5 years ago which did not demonstrate TOMMY but he has since gained weight.  In ER, his CXR showed cardiomegaly but no acute intrathoracic disease.  BNP was 424, Ddimer 0.88.   He was also noted to be in afib, rate controlled on 12 lead EKG.  He denies any prior history of afib. A1c=7.6.  HDL 49, .8, triglyceride 106, creatinine

## 2024-02-09 NOTE — PROGRESS NOTES
ASV order with sleep records, face sheet and  ID card faxed to [facility] today.  Confirmed with Tracy that they do have the ResMed Aircurve 10 available.    Sent medical equipment company and next follow-up visit details to patient through [employer].

## 2024-02-26 PROBLEM — Z79.01 ANTICOAGULATION ADEQUATE: Status: ACTIVE | Noted: 2024-02-26

## 2024-02-26 PROBLEM — I48.19 PERSISTENT ATRIAL FIBRILLATION (HCC): Status: ACTIVE | Noted: 2023-06-13

## 2024-02-27 ENCOUNTER — OFFICE VISIT (OUTPATIENT)
Age: 64
End: 2024-02-27
Payer: OTHER GOVERNMENT

## 2024-02-27 VITALS
HEART RATE: 55 BPM | DIASTOLIC BLOOD PRESSURE: 68 MMHG | OXYGEN SATURATION: 97 % | SYSTOLIC BLOOD PRESSURE: 100 MMHG | HEIGHT: 73 IN | BODY MASS INDEX: 41.75 KG/M2 | WEIGHT: 315 LBS

## 2024-02-27 DIAGNOSIS — Z79.01 ANTICOAGULATION ADEQUATE: ICD-10-CM

## 2024-02-27 DIAGNOSIS — I48.3 TYPICAL ATRIAL FLUTTER (HCC): ICD-10-CM

## 2024-02-27 DIAGNOSIS — Z79.4 TYPE 2 DIABETES MELLITUS WITH HYPERGLYCEMIA, WITH LONG-TERM CURRENT USE OF INSULIN (HCC): ICD-10-CM

## 2024-02-27 DIAGNOSIS — Z79.899 HIGH RISK MEDICATION USE: ICD-10-CM

## 2024-02-27 DIAGNOSIS — I10 HTN (HYPERTENSION), BENIGN: ICD-10-CM

## 2024-02-27 DIAGNOSIS — I48.19 PERSISTENT ATRIAL FIBRILLATION (HCC): Primary | ICD-10-CM

## 2024-02-27 DIAGNOSIS — E11.65 TYPE 2 DIABETES MELLITUS WITH HYPERGLYCEMIA, WITH LONG-TERM CURRENT USE OF INSULIN (HCC): ICD-10-CM

## 2024-02-27 DIAGNOSIS — I50.32 CHRONIC HEART FAILURE WITH PRESERVED EJECTION FRACTION (HCC): ICD-10-CM

## 2024-02-27 PROCEDURE — 99205 OFFICE O/P NEW HI 60 MIN: CPT | Performed by: INTERNAL MEDICINE

## 2024-02-27 PROCEDURE — 3078F DIAST BP <80 MM HG: CPT | Performed by: INTERNAL MEDICINE

## 2024-02-27 PROCEDURE — 3074F SYST BP LT 130 MM HG: CPT | Performed by: INTERNAL MEDICINE

## 2024-02-27 RX ORDER — AMIODARONE HYDROCHLORIDE 200 MG/1
TABLET ORAL
Qty: 90 TABLET | Refills: 1 | Status: SHIPPED | OUTPATIENT
Start: 2024-02-27

## 2024-02-27 RX ORDER — METOPROLOL SUCCINATE 25 MG/1
12.5 TABLET, EXTENDED RELEASE ORAL DAILY
Qty: 45 TABLET | Refills: 1 | Status: SHIPPED | OUTPATIENT
Start: 2024-02-27

## 2024-02-27 ASSESSMENT — PATIENT HEALTH QUESTIONNAIRE - PHQ9
SUM OF ALL RESPONSES TO PHQ9 QUESTIONS 1 & 2: 0
SUM OF ALL RESPONSES TO PHQ QUESTIONS 1-9: 0
2. FEELING DOWN, DEPRESSED OR HOPELESS: 0
1. LITTLE INTEREST OR PLEASURE IN DOING THINGS: 0
SUM OF ALL RESPONSES TO PHQ QUESTIONS 1-9: 0

## 2024-02-27 NOTE — PATIENT INSTRUCTIONS
Decrease the Metoprolol succinate to 12.5 mg daily  Start Amiodarone 400 mg daily x2 weeks, then down to 200 mg daily    Schedule for atrial fibrillation and atrial flutter ablation next available  Please hold anticoagulation on the AM of the procedure  Obtain blood work prior to the procedure    For more information regarding atrial fibrillation management, please visit:  [email or web address]      Your EP STUDY AND AFIB/AFLUTTER ABLATION procedure has been scheduled for 5/24/24 at 1130 AM, at [facility].    Please report to Admitting Department by 930 AM, or 2 hours prior to your scheduled procedure. Please bring a list of your current medications and medication bottles, if able, to the hospital on this day.  You will be unable to drive after your procedure so please make sure to bring someone with you to your procedure.    You will need to have nothing to eat or drink after midnight, the night prior to your procedure. You may have small sips of water, if needed, to take with your medication.    You will also need to see Dr. Sanchez's Nurse Practitioner, Sanna Bashir, in office prior to your procedure. An appointment has been scheduled for 5/10/24 at 1040 AM.    You will need labs drawn prior to your procedure. Please go to have this done after your appointment with Sanna DELAROSA.    You should stop your medication, ELIQUIS, THE MORNING OF your scheduled procedure.    After your procedure, you will need to follow up with Dr. Sanchez. Your follow-up appointment has been scheduled for 6/18/24 at 220 PM.

## 2024-02-27 NOTE — PROGRESS NOTES
refill(s), Acute, 8/18/24 11:00:00 PM CDT, Take 2 tab(s) Oral every 4 hr to 6 hours,PRN:pain (moderate), Pharmacy: [facility]      fluticasone (FLONASE) 50 MCG/ACT nasal spray 1 spray by Each Nostril route daily (Patient taking differently: 1 spray by Each Nostril route as needed) 16 g 0    FREESTYLE LITE strip Inject 1 each into the skin in the morning and at bedtime      rosuvastatin (CRESTOR) 20 MG tablet Take 1 tablet by mouth daily      apixaban (ELIQUIS) 5 MG TABS tablet Take 1 tablet by mouth 2 times daily 60 tablet 0    allopurinol (ZYLOPRIM) 100 MG tablet Take 3 tablets by mouth daily       No current facility-administered medications for this visit.     Allergies   Allergen Reactions    Misc. Sulfonamide Containing Compounds     Sulfa Antibiotics      Other reaction(s): Other: G6PD DEF     Past Medical History:   Diagnosis Date    Atrial fibrillation (HCC)     Diabetes mellitus (HCC)     Hypertension      Past Surgical History:   Procedure Laterality Date    CERVICAL FUSION       Family History   Problem Relation Age of Onset    Cancer Mother     Diabetes Father     Dementia Sister      Social History     Tobacco Use    Smoking status: Never     Passive exposure: Never    Smokeless tobacco: Never    Tobacco comments:     Cigar every now and then    Substance Use Topics    Alcohol use: Yes     Comment: rarely        Review of Systems:   12 point review of systems was performed. All negative except for HPI     Objective:   /68 (Site: Left Upper Arm, Position: Sitting, Cuff Size: Large Adult)   Pulse 55   Ht 1.854 m (6' 1\")   Wt (!) 145.6 kg (321 lb)   SpO2 97%   BMI 42.35 kg/m²       Physical Exam:   General:  Alert and oriented, in no acute distress  Head:  Atraumatic, normocephalic  Eyes:  extraocular muscles intact  Neck:  Supple, normal range of motion  Lungs:  Clear to auscultation bilaterally, no wheezes/rales/rhonchi   Cardiovascular: Irregularly irregular, variable S1-S2, no

## 2024-02-28 ENCOUNTER — TELEPHONE (OUTPATIENT)
Age: 64
End: 2024-02-28

## 2024-02-28 DIAGNOSIS — I48.3 TYPICAL ATRIAL FLUTTER (HCC): Primary | ICD-10-CM

## 2024-04-02 ENCOUNTER — PATIENT MESSAGE (OUTPATIENT)
Age: 64
End: 2024-04-02

## 2024-04-02 DIAGNOSIS — I10 HTN (HYPERTENSION), BENIGN: Primary | ICD-10-CM

## 2024-04-02 RX ORDER — SPIRONOLACTONE 25 MG/1
12.5 TABLET ORAL DAILY
Qty: 45 TABLET | Refills: 3 | Status: SHIPPED | OUTPATIENT
Start: 2024-04-02

## 2024-04-09 ENCOUNTER — TELEPHONE (OUTPATIENT)
Age: 64
End: 2024-04-09

## 2024-04-09 NOTE — TELEPHONE ENCOUNTER
Called patient to reschedule 1st Adherence follow up since the patient did not received the pap device. The patient is suppose to get the pap device on 4/11/24 from [facility]. RESCHEDULED

## 2024-05-04 NOTE — PROGRESS NOTES
[facility]  Cardiac Electrophysiology Clinic Care Note                  []Initial visit     [x]Established visit     Patient Name: Stefan Calabrese Sr. - :1960 - MRN:[id number]  Primary Cardiologist: Ramila Patel MD  Electrophysiologist: Brandee Sanchez MD     Reason for visit: H&P update    HPI:  Mr. Calabrese is a 63 y.o. male who presents for H&P update prior to upcoming planned ablation of persistent AF & typical AFL (scheduled for 2024).    On amiodarone since late 2024.    He denies any recurrent AF since last visit.    ECG today shows NSR 63, QTc 427 ms.    HFpEF.  Echo in 2024 showed LVEF 55-60% with severe LVH, mildly dilated RA, & mildly dilated aortic root.  NYHA II.  On appropriate GDMT.    14 day holter in 2023 showed 100% AF/AFL burden with max rate 161 bpm. Avg 79 bpm.  Multiple pauses were noted up to 3.6 seconds.    BP controlled.    Anticoagulated with Eliquis, denies bleeding issues.      Previous:  Hospitalized with AF/AFL in 10/2023 & 2023.       Assessment and Plan       ICD-10-CM    1. Persistent atrial fibrillation (HCC)  I48.19 EKG 12 Lead     CBC     Basic Metabolic Panel      2. Typical atrial flutter (HCC)  I48.3 CBC     Basic Metabolic Panel      3. Anticoagulation adequate  Z79.01 CBC     Basic Metabolic Panel      4. Chronic heart failure with preserved ejection fraction (HCC)  I50.32       5. Anticoagulated  Z79.01           Persistent AF/typical AFL:  -Hospitalized in 10/2023 & 2023.  -14 day holter in 2023 showed 100% AF/AFL burden with max rate 161 bpm. Avg 79 bpm.  Multiple pauses were noted up to 3.6 seconds.  -Started amiodarone in 2024, reduced Toprol XL at that time.  -ECG today shows NSR 63, QTc 427 ms.  -Reviewed risks/benefits of AF/AFL ablation.  He agrees to proceed as scheduled.    -Labs ordered.  -Continue amiodarone as bridge to ablation, anticipate stopping 4-8 weeks post ablation.  Should it be required for

## 2024-05-09 ENCOUNTER — OFFICE VISIT (OUTPATIENT)
Age: 64
End: 2024-05-09
Payer: OTHER GOVERNMENT

## 2024-05-09 VITALS
DIASTOLIC BLOOD PRESSURE: 82 MMHG | BODY MASS INDEX: 41.75 KG/M2 | HEART RATE: 64 BPM | SYSTOLIC BLOOD PRESSURE: 118 MMHG | HEIGHT: 73 IN | WEIGHT: 315 LBS | OXYGEN SATURATION: 97 %

## 2024-05-09 DIAGNOSIS — I48.19 PERSISTENT ATRIAL FIBRILLATION (HCC): ICD-10-CM

## 2024-05-09 DIAGNOSIS — Z79.01 ANTICOAGULATION ADEQUATE: ICD-10-CM

## 2024-05-09 DIAGNOSIS — E11.65 TYPE 2 DIABETES MELLITUS WITH HYPERGLYCEMIA, WITH LONG-TERM CURRENT USE OF INSULIN (HCC): Primary | ICD-10-CM

## 2024-05-09 DIAGNOSIS — E66.01 CLASS 3 SEVERE OBESITY DUE TO EXCESS CALORIES WITH BODY MASS INDEX (BMI) OF 45.0 TO 49.9 IN ADULT, UNSPECIFIED WHETHER SERIOUS COMORBIDITY PRESENT (HCC): ICD-10-CM

## 2024-05-09 DIAGNOSIS — I10 HTN (HYPERTENSION), BENIGN: ICD-10-CM

## 2024-05-09 DIAGNOSIS — I48.3 TYPICAL ATRIAL FLUTTER (HCC): ICD-10-CM

## 2024-05-09 DIAGNOSIS — Z79.4 TYPE 2 DIABETES MELLITUS WITH HYPERGLYCEMIA, WITH LONG-TERM CURRENT USE OF INSULIN (HCC): Primary | ICD-10-CM

## 2024-05-09 PROCEDURE — 3074F SYST BP LT 130 MM HG: CPT | Performed by: INTERNAL MEDICINE

## 2024-05-09 PROCEDURE — 99214 OFFICE O/P EST MOD 30 MIN: CPT | Performed by: INTERNAL MEDICINE

## 2024-05-09 PROCEDURE — 3079F DIAST BP 80-89 MM HG: CPT | Performed by: INTERNAL MEDICINE

## 2024-05-09 ASSESSMENT — PATIENT HEALTH QUESTIONNAIRE - PHQ9
SUM OF ALL RESPONSES TO PHQ QUESTIONS 1-9: 0
SUM OF ALL RESPONSES TO PHQ QUESTIONS 1-9: 0
2. FEELING DOWN, DEPRESSED OR HOPELESS: NOT AT ALL
SUM OF ALL RESPONSES TO PHQ9 QUESTIONS 1 & 2: 0
1. LITTLE INTEREST OR PLEASURE IN DOING THINGS: NOT AT ALL
SUM OF ALL RESPONSES TO PHQ QUESTIONS 1-9: 0
SUM OF ALL RESPONSES TO PHQ QUESTIONS 1-9: 0

## 2024-05-09 NOTE — PROGRESS NOTES
Patient: Stefan Calabrese Sr.  : 1960    Primary Cardiologist: Ramila Patel MD  EP Cardiologist:  PCP: Romeo Sommer MD    Today's Date: 2024    Echo today shows normal LVEF, severe LVH.  AFIB.  Adjusted BP meds, discussed TOMMY management and weight loss.  Plans for EP appt with Dr. Sanchez later this month to discuss ablation.    ASSESSMENT AND PLAN:     Assessment and Plan:  AFIB  Admission 2023 [facility]  AFIb with RVR  A/C HFrEF 45-50%, also component of HFpEF, LVH  Sp CASEY+ DCCV  Recurrent  Dr. Sanchez appt 24  Plans for AFL + PVI AFIB ablation 24  Eliquis toprol amio  3.6 pause on monitor, sleeping hours in setting of TOMMY    2. HTN  Entresto step 3  Nifedipine 50  Toprol XL 50    3. TOMMY  Central severe TOMMY - not a candidate of INSPIRE, Plans for Phrenic Nerve Stimulation (REMEDE) - not pursuing at this time.   Dr. Leggett.    New machine, compliant.    4.  Gout  Stable   Off colchine  Continue allopurinol 150mg po QD    5.  Weight loss      Follow up 6 months.      ICD-10-CM    1. Type 2 diabetes mellitus with hyperglycemia, with long-term current use of insulin (HCC)  E11.65     Z79.4       2. Persistent atrial fibrillation (HCC)  I48.19       3. HTN (hypertension), benign  I10       4. Class 3 severe obesity due to excess calories with body mass index (BMI) of 45.0 to 49.9 in adult, unspecified whether serious comorbidity present (HCC)  E66.01     Z68.42       5. Anticoagulation adequate  Z79.01       6. Typical atrial flutter (HCC)  I48.3           HISTORY OF PRESENT ILLNESS:     History of Present Illness:  Stefan Calabrese Sr. is a 63 y.o. male here for FU.    Admission [facility] 2023.     AFIB/HFpEF - plan for CASEY + DCCV 2023.  Eliquis started.  CCB changed to BB.  BP control.  IV lasix, transition to PO upon discharge.  Outpatient TOMMY screening.  DM2.     PMH of HTN (telmisartan-HCTZ and diltiazem CD), morbid obesity, pre-diabetes, recent pneumonia, who presents with chief c/o of

## 2024-05-10 ENCOUNTER — OFFICE VISIT (OUTPATIENT)
Age: 64
End: 2024-05-10
Payer: OTHER GOVERNMENT

## 2024-05-10 VITALS
OXYGEN SATURATION: 99 % | DIASTOLIC BLOOD PRESSURE: 80 MMHG | WEIGHT: 315 LBS | SYSTOLIC BLOOD PRESSURE: 136 MMHG | BODY MASS INDEX: 41.75 KG/M2 | RESPIRATION RATE: 18 BRPM | HEIGHT: 73 IN | HEART RATE: 61 BPM

## 2024-05-10 DIAGNOSIS — Z79.01 ANTICOAGULATION ADEQUATE: ICD-10-CM

## 2024-05-10 DIAGNOSIS — I48.3 TYPICAL ATRIAL FLUTTER (HCC): ICD-10-CM

## 2024-05-10 DIAGNOSIS — Z79.01 ANTICOAGULATED: ICD-10-CM

## 2024-05-10 DIAGNOSIS — I48.19 PERSISTENT ATRIAL FIBRILLATION (HCC): ICD-10-CM

## 2024-05-10 DIAGNOSIS — I48.19 PERSISTENT ATRIAL FIBRILLATION (HCC): Primary | ICD-10-CM

## 2024-05-10 DIAGNOSIS — I50.32 CHRONIC HEART FAILURE WITH PRESERVED EJECTION FRACTION (HCC): ICD-10-CM

## 2024-05-10 LAB
ANION GAP SERPL CALC-SCNC: 4 MMOL/L (ref 5–15)
BUN SERPL-MCNC: 24 MG/DL (ref 6–20)
BUN/CREAT SERPL: 18 (ref 12–20)
CALCIUM SERPL-MCNC: 9.5 MG/DL (ref 8.5–10.1)
CHLORIDE SERPL-SCNC: 109 MMOL/L (ref 97–108)
CO2 SERPL-SCNC: 26 MMOL/L (ref 21–32)
CREAT SERPL-MCNC: 1.36 MG/DL (ref 0.7–1.3)
ERYTHROCYTE [DISTWIDTH] IN BLOOD BY AUTOMATED COUNT: 19.5 % (ref 11.5–14.5)
GLUCOSE SERPL-MCNC: 107 MG/DL (ref 65–100)
HCT VFR BLD AUTO: 47.1 % (ref 36.6–50.3)
HGB BLD-MCNC: 14.5 G/DL (ref 12.1–17)
MCH RBC QN AUTO: 23.5 PG (ref 26–34)
MCHC RBC AUTO-ENTMCNC: 30.8 G/DL (ref 30–36.5)
MCV RBC AUTO: 76.5 FL (ref 80–99)
NRBC # BLD: 0 K/UL (ref 0–0.01)
NRBC BLD-RTO: 0 PER 100 WBC
PLATELET # BLD AUTO: 207 K/UL (ref 150–400)
PMV BLD AUTO: 10.4 FL (ref 8.9–12.9)
POTASSIUM SERPL-SCNC: 4.7 MMOL/L (ref 3.5–5.1)
RBC # BLD AUTO: 6.16 M/UL (ref 4.1–5.7)
SODIUM SERPL-SCNC: 139 MMOL/L (ref 136–145)
WBC # BLD AUTO: 5.3 K/UL (ref 4.1–11.1)

## 2024-05-10 PROCEDURE — 93000 ELECTROCARDIOGRAM COMPLETE: CPT | Performed by: NURSE PRACTITIONER

## 2024-05-10 PROCEDURE — 3075F SYST BP GE 130 - 139MM HG: CPT | Performed by: NURSE PRACTITIONER

## 2024-05-10 PROCEDURE — 3079F DIAST BP 80-89 MM HG: CPT | Performed by: NURSE PRACTITIONER

## 2024-05-10 PROCEDURE — 99214 OFFICE O/P EST MOD 30 MIN: CPT | Performed by: NURSE PRACTITIONER

## 2024-05-10 NOTE — PATIENT INSTRUCTIONS
Your EP STUDY AND AFIB/AFLUTTER ABLATION procedure has been scheduled for 5/24/24 at 1130 AM, at [facility].     Please report to Admitting Department by 930 AM, or 2 hours prior to your scheduled procedure. Please bring a list of your current medications and medication bottles, if able, to the hospital on this day.  You will be unable to drive after your procedure so please make sure to bring someone with you to your procedure.     You will need to have nothing to eat or drink after midnight, the night prior to your procedure. You may have small sips of water, if needed, to take with your medication.      You will need labs drawn prior to your procedure. Please go to have this done after your appointment with Sanna DELAROSA.     You should stop your medication, ELIQUIS, THE MORNING OF your scheduled procedure.     After your procedure, you will need to follow up with Dr. Sanchez. Your follow-up appointment has been scheduled for 6/18/24 at 200 PM.

## 2024-05-14 ENCOUNTER — PREP FOR PROCEDURE (OUTPATIENT)
Age: 64
End: 2024-05-14

## 2024-05-14 RX ORDER — SODIUM CHLORIDE 0.9 % (FLUSH) 0.9 %
5-40 SYRINGE (ML) INJECTION PRN
Status: CANCELLED | OUTPATIENT
Start: 2024-05-14

## 2024-05-14 RX ORDER — SODIUM CHLORIDE 0.9 % (FLUSH) 0.9 %
5-40 SYRINGE (ML) INJECTION EVERY 12 HOURS SCHEDULED
Status: CANCELLED | OUTPATIENT
Start: 2024-05-14

## 2024-05-14 RX ORDER — SODIUM CHLORIDE 9 MG/ML
INJECTION, SOLUTION INTRAVENOUS PRN
Status: CANCELLED | OUTPATIENT
Start: 2024-05-14

## 2024-05-23 ENCOUNTER — ANESTHESIA EVENT (OUTPATIENT)
Facility: HOSPITAL | Age: 64
End: 2024-05-23
Payer: OTHER GOVERNMENT

## 2024-05-24 ENCOUNTER — HOSPITAL ENCOUNTER (OUTPATIENT)
Facility: HOSPITAL | Age: 64
Discharge: HOME | End: 2024-05-24
Attending: INTERNAL MEDICINE | Admitting: INTERNAL MEDICINE
Payer: OTHER GOVERNMENT

## 2024-05-24 ENCOUNTER — ANESTHESIA (OUTPATIENT)
Facility: HOSPITAL | Age: 64
End: 2024-05-24
Payer: OTHER GOVERNMENT

## 2024-05-24 VITALS
RESPIRATION RATE: 19 BRPM | BODY MASS INDEX: 41.75 KG/M2 | WEIGHT: 315 LBS | OXYGEN SATURATION: 92 % | HEART RATE: 55 BPM | DIASTOLIC BLOOD PRESSURE: 82 MMHG | HEIGHT: 73 IN | TEMPERATURE: 97 F | SYSTOLIC BLOOD PRESSURE: 125 MMHG

## 2024-05-24 DIAGNOSIS — I48.3 TYPICAL ATRIAL FLUTTER (HCC): ICD-10-CM

## 2024-05-24 DIAGNOSIS — I48.19 PERSISTENT ATRIAL FIBRILLATION (HCC): ICD-10-CM

## 2024-05-24 LAB
ABO + RH BLD: NORMAL
ACT BLD: 314 SECS (ref 79–138)
ACT BLD: 331 SECS (ref 79–138)
BLOOD GROUP ANTIBODIES SERPL: NORMAL
ECHO BSA: 2.76 M2
EKG ATRIAL RATE: 53 BPM
EKG DIAGNOSIS: NORMAL
EKG P AXIS: 28 DEGREES
EKG P-R INTERVAL: 210 MS
EKG Q-T INTERVAL: 464 MS
EKG QRS DURATION: 96 MS
EKG QTC CALCULATION (BAZETT): 435 MS
EKG R AXIS: -34 DEGREES
EKG T AXIS: 8 DEGREES
EKG VENTRICULAR RATE: 53 BPM
GLUCOSE BLD STRIP.AUTO-MCNC: 140 MG/DL (ref 65–117)
SERVICE CMNT-IMP: ABNORMAL
SPECIMEN EXP DATE BLD: NORMAL

## 2024-05-24 PROCEDURE — 2500000003 HC RX 250 WO HCPCS: Performed by: NURSE ANESTHETIST, CERTIFIED REGISTERED

## 2024-05-24 PROCEDURE — 6360000002 HC RX W HCPCS: Performed by: NURSE ANESTHETIST, CERTIFIED REGISTERED

## 2024-05-24 PROCEDURE — 2709999900 HC NON-CHARGEABLE SUPPLY: Performed by: INTERNAL MEDICINE

## 2024-05-24 PROCEDURE — 2580000003 HC RX 258: Performed by: NURSE ANESTHETIST, CERTIFIED REGISTERED

## 2024-05-24 PROCEDURE — 93655 ICAR CATH ABLTJ DSCRT ARRHYT: CPT | Performed by: INTERNAL MEDICINE

## 2024-05-24 PROCEDURE — 3700000001 HC ADD 15 MINUTES (ANESTHESIA): Performed by: INTERNAL MEDICINE

## 2024-05-24 PROCEDURE — 93623 PRGRMD STIMJ&PACG IV RX NFS: CPT | Performed by: INTERNAL MEDICINE

## 2024-05-24 PROCEDURE — 85347 COAGULATION TIME ACTIVATED: CPT

## 2024-05-24 PROCEDURE — 76937 US GUIDE VASCULAR ACCESS: CPT | Performed by: INTERNAL MEDICINE

## 2024-05-24 PROCEDURE — 93613 INTRACARDIAC EPHYS 3D MAPG: CPT | Performed by: INTERNAL MEDICINE

## 2024-05-24 PROCEDURE — C1766 INTRO/SHEATH,STRBLE,NON-PEEL: HCPCS | Performed by: INTERNAL MEDICINE

## 2024-05-24 PROCEDURE — C1759 CATH, INTRA ECHOCARDIOGRAPHY: HCPCS | Performed by: INTERNAL MEDICINE

## 2024-05-24 PROCEDURE — 93622 COMP EP EVAL L VENTR PAC&REC: CPT | Performed by: INTERNAL MEDICINE

## 2024-05-24 PROCEDURE — 3700000000 HC ANESTHESIA ATTENDED CARE: Performed by: INTERNAL MEDICINE

## 2024-05-24 PROCEDURE — 82962 GLUCOSE BLOOD TEST: CPT

## 2024-05-24 PROCEDURE — 2720000010 HC SURG SUPPLY STERILE: Performed by: INTERNAL MEDICINE

## 2024-05-24 PROCEDURE — 93657 TX L/R ATRIAL FIB ADDL: CPT | Performed by: INTERNAL MEDICINE

## 2024-05-24 PROCEDURE — 93656 COMPRE EP EVAL ABLTJ ATR FIB: CPT | Performed by: INTERNAL MEDICINE

## 2024-05-24 PROCEDURE — 86900 BLOOD TYPING SEROLOGIC ABO: CPT

## 2024-05-24 PROCEDURE — 86850 RBC ANTIBODY SCREEN: CPT

## 2024-05-24 PROCEDURE — C1760 CLOSURE DEV, VASC: HCPCS | Performed by: INTERNAL MEDICINE

## 2024-05-24 PROCEDURE — 92960 CARDIOVERSION ELECTRIC EXT: CPT | Performed by: INTERNAL MEDICINE

## 2024-05-24 PROCEDURE — 6370000000 HC RX 637 (ALT 250 FOR IP)

## 2024-05-24 PROCEDURE — C1894 INTRO/SHEATH, NON-LASER: HCPCS | Performed by: INTERNAL MEDICINE

## 2024-05-24 PROCEDURE — 36415 COLL VENOUS BLD VENIPUNCTURE: CPT

## 2024-05-24 PROCEDURE — 86901 BLOOD TYPING SEROLOGIC RH(D): CPT

## 2024-05-24 PROCEDURE — 93662 INTRACARDIAC ECG (ICE): CPT | Performed by: INTERNAL MEDICINE

## 2024-05-24 PROCEDURE — C1732 CATH, EP, DIAG/ABL, 3D/VECT: HCPCS | Performed by: INTERNAL MEDICINE

## 2024-05-24 RX ORDER — AMIODARONE HYDROCHLORIDE 200 MG/1
200 TABLET ORAL DAILY
Qty: 30 TABLET | Refills: 1 | Status: SHIPPED | OUTPATIENT
Start: 2024-05-24

## 2024-05-24 RX ORDER — ROCURONIUM BROMIDE 10 MG/ML
INJECTION, SOLUTION INTRAVENOUS PRN
Status: DISCONTINUED | OUTPATIENT
Start: 2024-05-24 | End: 2024-05-24 | Stop reason: SDUPTHER

## 2024-05-24 RX ORDER — ONDANSETRON 2 MG/ML
INJECTION INTRAMUSCULAR; INTRAVENOUS PRN
Status: DISCONTINUED | OUTPATIENT
Start: 2024-05-24 | End: 2024-05-24 | Stop reason: SDUPTHER

## 2024-05-24 RX ORDER — FENTANYL CITRATE 50 UG/ML
INJECTION, SOLUTION INTRAMUSCULAR; INTRAVENOUS PRN
Status: DISCONTINUED | OUTPATIENT
Start: 2024-05-24 | End: 2024-05-24 | Stop reason: SDUPTHER

## 2024-05-24 RX ORDER — HEPARIN SODIUM 1000 [USP'U]/ML
INJECTION, SOLUTION INTRAVENOUS; SUBCUTANEOUS PRN
Status: DISCONTINUED | OUTPATIENT
Start: 2024-05-24 | End: 2024-05-24 | Stop reason: SDUPTHER

## 2024-05-24 RX ORDER — SODIUM CHLORIDE 0.9 % (FLUSH) 0.9 %
5-40 SYRINGE (ML) INJECTION EVERY 12 HOURS SCHEDULED
Status: DISCONTINUED | OUTPATIENT
Start: 2024-05-24 | End: 2024-05-24 | Stop reason: HOSPADM

## 2024-05-24 RX ORDER — PROPOFOL 10 MG/ML
INJECTION, EMULSION INTRAVENOUS PRN
Status: DISCONTINUED | OUTPATIENT
Start: 2024-05-24 | End: 2024-05-24 | Stop reason: SDUPTHER

## 2024-05-24 RX ORDER — SODIUM CHLORIDE 0.9 % (FLUSH) 0.9 %
5-40 SYRINGE (ML) INJECTION PRN
Status: DISCONTINUED | OUTPATIENT
Start: 2024-05-24 | End: 2024-05-24 | Stop reason: HOSPADM

## 2024-05-24 RX ORDER — DEXAMETHASONE SODIUM PHOSPHATE 4 MG/ML
INJECTION, SOLUTION INTRA-ARTICULAR; INTRALESIONAL; INTRAMUSCULAR; INTRAVENOUS; SOFT TISSUE PRN
Status: DISCONTINUED | OUTPATIENT
Start: 2024-05-24 | End: 2024-05-24 | Stop reason: SDUPTHER

## 2024-05-24 RX ORDER — SUCCINYLCHOLINE CHLORIDE 20 MG/ML
INJECTION INTRAMUSCULAR; INTRAVENOUS PRN
Status: DISCONTINUED | OUTPATIENT
Start: 2024-05-24 | End: 2024-05-24 | Stop reason: SDUPTHER

## 2024-05-24 RX ORDER — SODIUM CHLORIDE 9 MG/ML
INJECTION, SOLUTION INTRAVENOUS CONTINUOUS PRN
Status: DISCONTINUED | OUTPATIENT
Start: 2024-05-24 | End: 2024-05-24 | Stop reason: SDUPTHER

## 2024-05-24 RX ORDER — MIDAZOLAM HYDROCHLORIDE 1 MG/ML
INJECTION INTRAMUSCULAR; INTRAVENOUS PRN
Status: DISCONTINUED | OUTPATIENT
Start: 2024-05-24 | End: 2024-05-24 | Stop reason: SDUPTHER

## 2024-05-24 RX ORDER — SODIUM CHLORIDE 9 MG/ML
INJECTION, SOLUTION INTRAVENOUS PRN
Status: DISCONTINUED | OUTPATIENT
Start: 2024-05-24 | End: 2024-05-24 | Stop reason: HOSPADM

## 2024-05-24 RX ORDER — EPHEDRINE SULFATE 50 MG/ML
INJECTION INTRAVENOUS PRN
Status: DISCONTINUED | OUTPATIENT
Start: 2024-05-24 | End: 2024-05-24 | Stop reason: SDUPTHER

## 2024-05-24 RX ORDER — PROTAMINE SULFATE 10 MG/ML
INJECTION, SOLUTION INTRAVENOUS PRN
Status: DISCONTINUED | OUTPATIENT
Start: 2024-05-24 | End: 2024-05-24 | Stop reason: SDUPTHER

## 2024-05-24 RX ORDER — ACETAMINOPHEN 325 MG/1
650 TABLET ORAL EVERY 4 HOURS PRN
Status: DISCONTINUED | OUTPATIENT
Start: 2024-05-24 | End: 2024-05-24 | Stop reason: HOSPADM

## 2024-05-24 RX ORDER — PANTOPRAZOLE SODIUM 40 MG/1
40 TABLET, DELAYED RELEASE ORAL
Qty: 60 TABLET | Refills: 0 | Status: SHIPPED | OUTPATIENT
Start: 2024-05-24

## 2024-05-24 RX ORDER — LIDOCAINE HYDROCHLORIDE 20 MG/ML
INJECTION, SOLUTION EPIDURAL; INFILTRATION; INTRACAUDAL; PERINEURAL PRN
Status: DISCONTINUED | OUTPATIENT
Start: 2024-05-24 | End: 2024-05-24 | Stop reason: SDUPTHER

## 2024-05-24 RX ORDER — PHENYLEPHRINE HCL IN 0.9% NACL 0.4MG/10ML
SYRINGE (ML) INTRAVENOUS PRN
Status: DISCONTINUED | OUTPATIENT
Start: 2024-05-24 | End: 2024-05-24 | Stop reason: SDUPTHER

## 2024-05-24 RX ADMIN — MIDAZOLAM 2 MG: 1 INJECTION INTRAMUSCULAR; INTRAVENOUS at 12:32

## 2024-05-24 RX ADMIN — HEPARIN SODIUM 20000 UNITS: 1000 INJECTION, SOLUTION INTRAVENOUS; SUBCUTANEOUS at 13:09

## 2024-05-24 RX ADMIN — ISOPROTERENOL HYDROCHLORIDE 5 MCG/MIN: 0.2 INJECTION, SOLUTION INTRAMUSCULAR; INTRAVENOUS at 13:46

## 2024-05-24 RX ADMIN — PHENYLEPHRINE HYDROCHLORIDE 50 MCG/MIN: 10 INJECTION INTRAVENOUS at 12:44

## 2024-05-24 RX ADMIN — PROTAMINE SULFATE 20 MG: 10 INJECTION, SOLUTION INTRAVENOUS at 14:03

## 2024-05-24 RX ADMIN — ROCURONIUM BROMIDE 5 MG: 10 SOLUTION INTRAVENOUS at 12:36

## 2024-05-24 RX ADMIN — SODIUM CHLORIDE: 9 INJECTION, SOLUTION INTRAVENOUS at 12:29

## 2024-05-24 RX ADMIN — DEXAMETHASONE SODIUM PHOSPHATE 4 MG: 4 INJECTION, SOLUTION INTRAMUSCULAR; INTRAVENOUS at 12:46

## 2024-05-24 RX ADMIN — DEXTROSE MONOHYDRATE 1800 UNITS/HR: 50 INJECTION, SOLUTION INTRAVENOUS at 13:09

## 2024-05-24 RX ADMIN — HEPARIN SODIUM 1000 UNITS: 1000 INJECTION, SOLUTION INTRAVENOUS; SUBCUTANEOUS at 13:27

## 2024-05-24 RX ADMIN — Medication 120 MCG: at 12:48

## 2024-05-24 RX ADMIN — LIDOCAINE HYDROCHLORIDE 100 MG: 20 INJECTION, SOLUTION EPIDURAL; INFILTRATION; INTRACAUDAL; PERINEURAL at 12:36

## 2024-05-24 RX ADMIN — ACETAMINOPHEN 650 MG: 325 TABLET ORAL at 16:18

## 2024-05-24 RX ADMIN — PROTAMINE SULFATE 10 MG: 10 INJECTION, SOLUTION INTRAVENOUS at 14:02

## 2024-05-24 RX ADMIN — PROPOFOL 200 MG: 10 INJECTION, EMULSION INTRAVENOUS at 12:37

## 2024-05-24 RX ADMIN — FENTANYL CITRATE 100 MCG: 50 INJECTION, SOLUTION INTRAMUSCULAR; INTRAVENOUS at 12:36

## 2024-05-24 RX ADMIN — ONDANSETRON 4 MG: 2 INJECTION INTRAMUSCULAR; INTRAVENOUS at 12:50

## 2024-05-24 RX ADMIN — PROTAMINE SULFATE 20 MG: 10 INJECTION, SOLUTION INTRAVENOUS at 14:09

## 2024-05-24 RX ADMIN — EPHEDRINE SULFATE 10 MG: 50 INJECTION INTRAVENOUS at 12:44

## 2024-05-24 RX ADMIN — SUCCINYLCHOLINE CHLORIDE 160 MG: 20 INJECTION, SOLUTION INTRAMUSCULAR; INTRAVENOUS at 12:37

## 2024-05-24 ASSESSMENT — PAIN - FUNCTIONAL ASSESSMENT
PAIN_FUNCTIONAL_ASSESSMENT: 0-10
PAIN_FUNCTIONAL_ASSESSMENT: NONE - DENIES PAIN

## 2024-05-24 ASSESSMENT — PAIN DESCRIPTION - DESCRIPTORS: DESCRIPTORS: ACHING

## 2024-05-24 ASSESSMENT — PAIN SCALES - GENERAL: PAINLEVEL_OUTOF10: 3

## 2024-05-24 NOTE — PROGRESS NOTES
EP LAB to Recovery Room Report    Procedure: RF A. Fib Ablation    MD: Daniel    Pre-procedure heart rhythm: NSR  Verbal Report given to Recovery Nurse on patient being transferred to Recovery Room for routine post-op. Patient stable upon transfer to .  Pt had general anesthesia. Vitals, mental status, MAR, procedural summary discussed with recovery RN.     Patient cardioverted x1 at 300J during procedure.      A total of Heparin 21,000 units given.  Protamine 50mg given post ablation.      Post-procedure heart rhythm: SB 50's    Sheaths:  1413hrs:  Right femoral vein 8.5fr sheath removed, closed with Perclose.  1414hrs:  Left femoral vein 11fr sheath removed, closed with Perclose.  1415hrs:  Left femoral vein 8fr sheath removed, closed with Perclose.

## 2024-05-24 NOTE — ANESTHESIA POSTPROCEDURE EVALUATION
Post-Anesthesia Evaluation and Assessment    Patient: Stefan Calabrese Sr. MRN: [id number]  SSN: [id number]    YOB: 1960  Age: 63 y.o.  Sex: male      I have evaluated the patient and they are stable and ready for discharge from the PACU.     Cardiovascular Function/Vital Signs  Visit Vitals  /73   Pulse 54   Temp 97 °F (36.1 °C) (Axillary)   Resp 23   Ht 1.854 m (6' 1\")   Wt (!) 147.4 kg (325 lb)   SpO2 92%   BMI 42.88 kg/m²       Patient is status post General anesthesia for Procedure(s):  Ablation A-fib w complete ep study  Ablation A-flutter  Ep 3d mapping  Drug stimulation  Ultrasound guided vascular access  Intracardiac echocardiogram.    Nausea/Vomiting: None    Postoperative hydration reviewed and adequate.    Pain:      Managed    Neurological Status:       At baseline    Mental Status, Level of Consciousness: Alert and  oriented to person, place, and time    Pulmonary Status:       Adequate oxygenation and airway patent    Complications related to anesthesia: None    Post-anesthesia assessment completed. No concerns    Signed By: Curtis Perez MD     May 24, 2024            Department of Anesthesiology  Postprocedure Note    Patient: Stefan Calarbese Sr.  MRN: [id number]  YOB: 1960  Date of evaluation: 5/24/2024    Procedure Summary       Date: 05/24/24 Room / Location: [facility] CATH LAB 3 / [facility] CARDIAC CATH LAB    Anesthesia Start: 1229 Anesthesia Stop: 1436    Procedures:       Ablation A-fib w complete ep study      Ablation A-flutter      Ep 3d mapping      Drug stimulation      Ultrasound guided vascular access      Intracardiac echocardiogram Diagnosis:       Persistent atrial fibrillation (HCC)      Typical atrial flutter (HCC)      (Persistent atrial fibrillation (HCC) [I48.19])      (Typical atrial flutter (HCC) [I48.3])    Providers: Brandee Sanchez MD Responsible Provider: Curtis Perez MD    Anesthesia Type: General ASA Status: 3            Anesthesia Type: General    [place]

## 2024-05-24 NOTE — ANESTHESIA PRE PROCEDURE
Department of Anesthesiology  Preprocedure Note       Name:  Stefan Calabrese Sr.   Age:  63 y.o.  :  1960                                          MRN:  [id number]         Date:  2024      Surgeon: Surgeon(s):  Brandee Sanchez MD    Procedure: Procedure(s):  Ablation A-fib w complete ep study  Ablation A-flutter    Medications prior to admission:   Prior to Admission medications    Medication Sig Start Date End Date Taking? Authorizing Provider   spironolactone (ALDACTONE) 25 MG tablet Take 0.5 tablets by mouth daily 24   Ramila Patel MD   metoprolol succinate (TOPROL XL) 25 MG extended release tablet Take 0.5 tablets by mouth daily 24   Brandee Sanchez MD   amiodarone (CORDARONE) 200 MG tablet START AMIODARONE 400 MG DAILY FOR 2 WEEKS, THEN DECREASE  DAILY  Patient taking differently: Take 1 tablet by mouth daily START AMIODARONE 400 MG DAILY FOR 2 WEEKS, THEN DECREASE  DAILY 24   Brandee Sanchez MD   NIFEdipine (NIFEDICAL XL) 60 MG extended release tablet Take 1 tablet by mouth daily 24   Ramila Patel MD   sacubitril-valsartan (ENTRESTO)  MG per tablet Take 1 tablet by mouth 2 times daily 1/15/24   Ramila Patel MD   JARDIANCE 25 MG tablet Take 1 tablet by mouth daily 10/9/23   Donovan Arias MD   vitamin D3 (CHOLECALCIFEROL) 25 MCG (1000 UT) TABS tablet Take 1 tablet by mouth daily 23   Donovan Arias MD   acetaminophen (TYLENOL) 325 MG tablet   See Instructions, PRN pain (moderate), Take 2 tab(s) Oral every 4 hr to 6 hours, # 50 tab(s), 1 total refill(s), Acute, 24 11:00:00 PM CDT, Take 2 tab(s) Oral every 4 hr to 6 hours,PRN:pain (moderate), Pharmacy: [facility] 23  Donovan Arias MD   fluticasone (FLONASE) 50 MCG/ACT nasal spray 1 spray by Each Nostril route daily  Patient taking differently: 1 spray by Each Nostril route as needed 10/28/23   Juliana Varma, APRN - NP   FREESTYLE LITE strip Inject 1 each into 
the skin in the morning and at bedtime 7/12/23   Donovan Arias MD   rosuvastatin (CRESTOR) 20 MG tablet Take 1 tablet by mouth daily    Donovan Arias MD   apixaban (ELIQUIS) 5 MG TABS tablet Take 1 tablet by mouth 2 times daily 6/14/23   Elaina Blackwell MD   allopurinol (ZYLOPRIM) 100 MG tablet Take 3 tablets by mouth daily    Provider, MD Donovan       Current medications:    Current Facility-Administered Medications   Medication Dose Route Frequency Provider Last Rate Last Admin   • sodium chloride flush 0.9 % injection 5-40 mL  5-40 mL IntraVENous 2 times per day Jackelin Swan APRN - CNP       • sodium chloride flush 0.9 % injection 5-40 mL  5-40 mL IntraVENous PRN Jackelin Swan APRN - CNP       • 0.9 % sodium chloride infusion   IntraVENous PRN Jackelin Swan APRN - CNP           Allergies:    Allergies   Allergen Reactions   • Misc. Sulfonamide Containing Compounds    • Sulfa Antibiotics      Other reaction(s): Other: G6PD DEF       Problem List:    Patient Active Problem List   Diagnosis Code   • Dyspnea on exertion R06.09   • Type 2 diabetes mellitus with hyperglycemia, with long-term current use of insulin ([facility]) E11.65, Z79.4   • Persistent atrial fibrillation (HCC) I48.19   • HTN (hypertension), benign I10   • Peripheral edema R60.0   • Wheezing R06.2   • Class 3 severe obesity due to excess calories in adult ([facility]) E66.01   • Chronic heart failure with preserved ejection fraction ([facility]) I50.32   • Gout of right knee M10.9   • Knee pain M25.569   • Ambulatory dysfunction R26.2   • Central sleep apnea G47.31   • Anticoagulation adequate Z79.01   • High risk medication use Z79.899   • Typical atrial flutter (HCC) I48.3       Past Medical History:        Diagnosis Date   • Atrial fibrillation (HCC)    • Diabetes mellitus (HCC)    • Hypertension    • Typical atrial flutter (HCC)        Past Surgical History:        Procedure Laterality Date   • CERVICAL FUSION         Social History:

## 2024-05-24 NOTE — PROGRESS NOTES
Stefan Calabrese Sr. presents today for consent discussion regarding the REAL-AF clinical trial.  Informed consent form (version date 16Jan2021, IRB approval date [id number]) and HIPAA were reviewed with patient in detail, specifically discussing the study rationale, schedule of events, side effects and/or risks, potential benefits, alternative choices, potential costs, confidentiality and privacy issues, and the voluntary nature of both participation and withdrawal. The patient was given time to ask questions and confirmed that I answered all of the patient's questions at this time.    Stefan Calabrese Sr. verbalizes understanding of the information presented in the informed consent form.They are in agreement to participate in this clinical trial and signed the informed consent today, which includes HIPAA details. They was given a copy of the signed consent.     I will follow up with the patient regarding the scheduling of the protocol required appointments and procedures. No protocol procedures were done prior to signing consent. I reviewed my contact information and encouraged the patient to contact me with any questions or concerns.     Protocol: REAL-AF    Informed Consent Version Date: 16Jan2021       IRB Approval Date(s):  24Oct2023     Informed consent was discussed in detail. The patient was educated regarding rationale of the study, responsibilities of the patient, medications/pre-medications, tests/study procedures, all treatments to include side effects or risks and potential benefits, study schedule, follow-up, alternative choices, potential costs, confidentiality, and privacy issues.     The patient was given the opportunity to review the Informed Consent Form, ask all questions, and confirms that all questions were answered to the patient's satisfaction.     The patient confirms understanding that participation is voluntary, that the refusal to participate will involve no penalty or loss of benefits to

## 2024-05-24 NOTE — PROGRESS NOTES
1440-TRANSFER - IN Cath Lab RR REPORT:    Verbal report received from Shanna Calabrese Sr.  being received from the EP Lab for routine progression of care. Report consisted of patient’s Situation, Background, Assessment and Recommendations(SBAR). Procedural summary and MAR reviewed with receiving nurse. Opportunity for questions and clarification was provided. Assessment completed upon patient’s arrival to Cardiac Cath Lab RECOVERY AREA and care assumed.       Cardiac Cath Lab Recovery Arrival Note:    Stefan Calabrese Sr. arrived to [facility] recovery area.  Patient procedure= Afib ablation. Patient on cardiac monitor, non-invasive blood pressure, SPO2 monitor. On  O2 @ 4lpm via NC.  IV  of NS on pump at 50 ml/hr. Patient is A&Ox 3. Patient reports discomfort PL 3 at groin.    PROCEDURE SITE CHECK:    Procedure site: No bleeding and no hematoma, no pain/discomfort reported at procedure site.     No change in patient status. Continue to monitor patient and status.     1730 Discharge note-     Ambulated patient to the bathroom with a steady gait, voided without difficulty. Patient denies chest pain, shortness of breath, or dizziness. Returned to [facility]. Vital signs stable.     Procedural site is clean, dry, and intact. No bleeding, no hematoma.     Assisted patient in dressing    Educated patient about their sedation precautions such as not driving, operating any machinery, or signing legal documents 24 hours post procedure.     Reviewed discharge instructions, including medications and site care using the teach back method.  Answered all questions. Verbalized understanding.     Removed peripheral IV.        Patient's spouse present to take patient home. Reviewed discharge instructions with patients' spouse, they verbalized understanding.

## 2024-05-24 NOTE — PROGRESS NOTES
Cardiac Cath Lab Recovery Arrival Note:      Stefan Bharati Johnson arrived to Cardiac Cath Lab, Recovery Area. Staff introduced to patient. Patient identifiers verified with NAME and DATE OF BIRTH. Procedure verified with patient. Consent forms reviewed and signed by patient or authorized representative and verified. Allergies verified. Patient informed of procedure and plan of care. Questions answered with review. Patient prepped for procedure, per orders from physician, prior to arrival.    Patient on cardiac monitor, non-invasive blood pressure, SPO2 monitor. Patient is A&Ox 4. Patient reports no complaints.     Patient in stretcher, in low position, with side rails up, call bell within reach, patient instructed to call of assistance as needed.    Patient prep in: [facility] Recovery Area, Bed# FT.   Family in: outside the hospital.   Prep by: GUADALUPE Rodriguez

## 2024-05-24 NOTE — PROCEDURES
RADIOFREQUENCY ATRIAL FIBRILLATION ABLATION  TYPICAL ATRIAL FLUTTER ABLATION  SUBSTRATE MODIFICATION ABLATION    Procedure Date: 05/24/24   Lab Physician: Brandee Sanchez MD    Indications:  64 yo gentleman with a history of HTN, DM, persistent atrial fibrillation/atrial flutter s/p CASEY/DCCV now with recurrence into persistent AF/AFL on Amiodarone now referred for AF/AFL ablation.     SHEATH INSERTION  All sheaths were placed using the modified Seldinger technique with ultrasound guided assistance  Right Femoral Vein: 8.5Fr Agilis sheath  Left Femoral Vein: 8Fr and a 11F sheath    CATHETER INSERTION  Catheters were advanced to the following positions using fluoroscopic guidance:  Coronary Sinus: : [employer]. Bidirectional Decapolar  LA: [employer] OctaRay Mapping catheter  Ablation: [employer]. ST/SF D/F ablation catheter  ICE in RA/RV: [employer]. Intracardiac Ultrasound    PROCEDURE NARRATIVE:  EPS and RFA were discussed with the patient in great detail. The risks of bleeding, infection, vascular injury, pulmonary embolus, cardiac perforation, heart block necessitating pacemaker insertion, stroke, MI and death were among those discussed. Alternatives were reviewed including the option of no therapy. All questions were answered. The patient agreed to proceed. Written informed consent was obtained from the patient after a full explanation of the risks and benefits of the procedure. The patient was brought to the lab in the fasting state. Continuous electrocardiographic and hemodynamic monitoring was initiated. The patient was prepped and draped in the usual sterile fashion. General anesthesia with intubation and mechanical conventional ventilation was used. Anesthesia staff performed the intubation and monitoring during the case.  The EnsoETM esophageal cooling device was inserted post intubation. The device placement was confirm under fluoroscopy (with the tip of the device below the level of the diaphragm) and intracardiac  stable, tolerated the procedure well and was transferred in good condition to the PACU.    Members of the general anesthesia staff and the EP nursing staff provided appropriate monitoring throughout the procedure. The patient tolerated the procedure well and left the laboratory in good condition.    Conclusion:  1. Paroxysmal/Persistent atrial fibrillation s/p successful pulmonary vein isolation in an antral circumferential approach.  2. Additional substrate modification ablation targeting extrapulmonary veins triggers with ganglionated plexi (GP) ablation and endocardial ablation of Ligament of Ranjit.  3. Inducible Typical Isthmus-dependent atrial flutter s/p successful cavotricuspid isthmus ablation.  4. No acute PVs reconnection noted after a waiting period and with administration of Isuprel.  5. No inducible arrhythmia at the end of case.  6. Normal His-Purkinje system    Plans:  1. Bedrest x 2 hours.  2. Observation to rule out post-procedure complications including CVA, CHF, and bleeding.  3. Continue systemic anticoagulation.  4. Continue home medications  5. PPI BID for 1 month.  6. Outpatient follow up with EP in 1 month

## 2024-05-24 NOTE — PROGRESS NOTES
Cardiac Cath Lab Procedure Area Arrival Note:    Stefan Calabrese Sr. arrived to Cardiac Cath Lab, Procedure Area. Patient identifiers verified with NAME and DATE OF BIRTH. Procedure verified with patient. Consent forms verified. Allergies verified. Patient informed of procedure and plan of care. Questions answered with review. Patient voiced understanding of procedure and plan of care.    Patient on cardiac monitor, non-invasive blood pressure, SPO2 monitor. On room air; airway to be managed by anesthesia for duration of procedure.  IV of normal saline on pump at 25 ml/hr. Patient status doing well without problems. Patient is A&Ox 4. Patient reports previous neck surgery--placed in position for comfort.     Patient medicated during procedure with orders obtained and verified by Dr. Sanchez.    Refer to patients Cardiac Cath Lab PROCEDURE REPORT for vital signs, assessment, status, and response during procedure, printed at end of case. Printed report on chart or scanned into chart.

## 2024-05-28 NOTE — PROGRESS NOTES
Called patient to follow up after ablation 5/24/24. He has no chest pain. Kj groin sites healing. No questions. He verbalized understanding and will call for issues.

## 2024-06-18 ENCOUNTER — OFFICE VISIT (OUTPATIENT)
Age: 64
End: 2024-06-18
Payer: OTHER GOVERNMENT

## 2024-06-18 VITALS
WEIGHT: 315 LBS | BODY MASS INDEX: 41.75 KG/M2 | RESPIRATION RATE: 18 BRPM | OXYGEN SATURATION: 96 % | HEIGHT: 73 IN | SYSTOLIC BLOOD PRESSURE: 134 MMHG | HEART RATE: 58 BPM | DIASTOLIC BLOOD PRESSURE: 82 MMHG

## 2024-06-18 DIAGNOSIS — I50.32 CHRONIC HEART FAILURE WITH PRESERVED EJECTION FRACTION (HCC): ICD-10-CM

## 2024-06-18 DIAGNOSIS — E11.65 TYPE 2 DIABETES MELLITUS WITH HYPERGLYCEMIA, WITH LONG-TERM CURRENT USE OF INSULIN (HCC): ICD-10-CM

## 2024-06-18 DIAGNOSIS — Z79.4 TYPE 2 DIABETES MELLITUS WITH HYPERGLYCEMIA, WITH LONG-TERM CURRENT USE OF INSULIN (HCC): ICD-10-CM

## 2024-06-18 DIAGNOSIS — Z79.01 ANTICOAGULATION ADEQUATE: ICD-10-CM

## 2024-06-18 DIAGNOSIS — I48.19 PERSISTENT ATRIAL FIBRILLATION (HCC): Primary | ICD-10-CM

## 2024-06-18 DIAGNOSIS — Z79.899 HIGH RISK MEDICATION USE: ICD-10-CM

## 2024-06-18 DIAGNOSIS — I10 HTN (HYPERTENSION), BENIGN: ICD-10-CM

## 2024-06-18 DIAGNOSIS — I48.3 TYPICAL ATRIAL FLUTTER (HCC): ICD-10-CM

## 2024-06-18 PROCEDURE — 3079F DIAST BP 80-89 MM HG: CPT | Performed by: INTERNAL MEDICINE

## 2024-06-18 PROCEDURE — 99214 OFFICE O/P EST MOD 30 MIN: CPT | Performed by: INTERNAL MEDICINE

## 2024-06-18 PROCEDURE — 3075F SYST BP GE 130 - 139MM HG: CPT | Performed by: INTERNAL MEDICINE

## 2024-06-18 PROCEDURE — 93000 ELECTROCARDIOGRAM COMPLETE: CPT | Performed by: INTERNAL MEDICINE

## 2024-06-18 RX ORDER — ALLOPURINOL 300 MG/1
300 TABLET ORAL DAILY
COMMUNITY
Start: 2024-05-26

## 2024-06-18 RX ORDER — SEMAGLUTIDE 0.68 MG/ML
0.25 INJECTION, SOLUTION SUBCUTANEOUS
COMMUNITY
Start: 2024-06-18

## 2024-06-18 ASSESSMENT — PATIENT HEALTH QUESTIONNAIRE - PHQ9
SUM OF ALL RESPONSES TO PHQ QUESTIONS 1-9: 0
SUM OF ALL RESPONSES TO PHQ QUESTIONS 1-9: 0
2. FEELING DOWN, DEPRESSED OR HOPELESS: NOT AT ALL
1. LITTLE INTEREST OR PLEASURE IN DOING THINGS: NOT AT ALL
SUM OF ALL RESPONSES TO PHQ QUESTIONS 1-9: 0
SUM OF ALL RESPONSES TO PHQ QUESTIONS 1-9: 0
SUM OF ALL RESPONSES TO PHQ9 QUESTIONS 1 & 2: 0

## 2024-06-18 NOTE — PROGRESS NOTES
134/82   Pulse 58   Resp 18   Ht 1.854 m (6' 1\")   Wt (!) 151.5 kg (334 lb)   SpO2 96%   BMI 44.07 kg/m²       Physical Exam:   General:  Alert and oriented, in no acute distress  Head:  Atraumatic, normocephalic  Eyes:  extraocular muscles intact  Neck:  Supple, normal range of motion  Lungs:  Clear to auscultation bilaterally, no wheezes/rales/rhonchi   Cardiovascular: Irregularly irregular, variable S1-S2, no murmurs/rubs/gallops  Abdomen:  Soft, nontender, nondistended, normoactive bowel sounds  Skin:  Intact, no rash  Extremities:, no clubbing, cyanosis, or edema  Musculoskeletal: normal range of motion  Neurological:  Alert and oriented, no focal neurologic deficits  Psychiatric:  Normal mood and affect    EKG: sinus, 58 bpm, normal axis, Qtc 432 ms    No results found for: \"HBA1C\", \"CKO5PPEJ\"    02/09/24    ECHO (TTE) COMPLETE (PRN CONTRAST/BUBBLE/STRAIN/3D) 02/09/2024  3:00 PM (Final)    Interpretation Summary    Left Ventricle: Normal left ventricular systolic function with a visually estimated EF of 55 - 60%. Left ventricle is smaller than normal. Severely increased wall thickness. Normal wall motion. Diastolic function indeterminate in the setting of atrial fibrillation.    Right Atrium: Right atrium is mildly dilated.    Aorta: Normal sized ascending aorta. Mildly dilated aortic root. Ao root diameter is 3.9 cm.    Image quality is adequate.    Signed by: Ramila Patel MD on 2/9/2024  3:00 PM    No results found for this or any previous visit.    No results found for this or any previous visit.      No valid procedures specified.  No valid procedures specified.  No valid procedures specified.  [unfilled]          Thank you for involving me in this patient's care and please call with further concerns or questions.      ________________________________________  An Edgar Sanchez MD, FACC, RS  Cardiac Electrophysiology  [facility] [place]  [address], [address]

## 2024-06-18 NOTE — PATIENT INSTRUCTIONS
Stop Amiodarone today  FU with NP in 2 months  FU with Dr. Sanchez in 11 months with 1 week event monitor prior    [facility]  Emphasized CPAP compliance  Goal weight loss of 10%, 35 lbs  Goal blood pressure less than 130/90 mmHg  Goal A1C <7%

## 2024-06-21 RX ORDER — PANTOPRAZOLE SODIUM 40 MG/1
40 TABLET, DELAYED RELEASE ORAL
Qty: 60 TABLET | Refills: 0 | OUTPATIENT
Start: 2024-06-21

## 2024-06-21 NOTE — TELEPHONE ENCOUNTER
Requested Prescriptions     Refused Prescriptions Disp Refills    pantoprazole (PROTONIX) 40 MG tablet [Pharmacy Med Name: PANTOPRAZOLE 40MG TABLETS] 60 tablet 0     Sig: TAKE 1 TABLET BY MOUTH TWICE DAILY BEFORE MEALS     Refused By: JEFFERSON WISEMAN     Reason for Refusal: Refill not appropriate     Refill refused per verbal order Jackelin Swan NP  Last visit:6/18//24  Next visit:8/15/24  Only prescribed for 30 days post ablation.

## 2024-07-08 RX ORDER — AMIODARONE HYDROCHLORIDE 200 MG/1
TABLET ORAL
Qty: 30 TABLET | Refills: 1 | OUTPATIENT
Start: 2024-07-08

## 2024-07-08 ASSESSMENT — SLEEP AND FATIGUE QUESTIONNAIRES
HOW LIKELY ARE YOU TO NOD OFF OR FALL ASLEEP WHEN YOU ARE A PASSENGER IN A CAR FOR AN HOUR WITHOUT A BREAK: SLIGHT CHANCE OF DOZING
ESS TOTAL SCORE: 6
HOW LIKELY ARE YOU TO NOD OFF OR FALL ASLEEP WHILE LYING DOWN TO REST IN THE AFTERNOON WHEN CIRCUMSTANCES PERMIT: MODERATE CHANCE OF DOZING
HOW LIKELY ARE YOU TO NOD OFF OR FALL ASLEEP WHILE WATCHING TV: SLIGHT CHANCE OF DOZING
DO YOU HAVE DIFFICULTY BEING AS ACTIVE AS YOU WANT TO BE IN THE MORNING BECAUSE YOU ARE SLEEPY OR TIRED: NO
HOW LIKELY ARE YOU TO NOD OFF OR FALL ASLEEP WHILE SITTING QUIETLY AFTER LUNCH WITHOUT ALCOHOL: WOULD NEVER DOZE
DO YOU HAVE DIFFICULTY VISITING YOUR FAMILY OR FRIENDS IN THEIR HOME BECAUSE YOU BECOME SLEEPY OR TIRED: NO
HOW LIKELY ARE YOU TO NOD OFF OR FALL ASLEEP WHILE WATCHING TV: SLIGHT CHANCE OF DOZING
DO YOU HAVE DIFFICULTY OPERATING A MOTOR VEHICLE FOR SHORT DISTANCES (LESS THAN 100 MILES) BECAUSE YOU BECOME SLEEPY: NO
HOW LIKELY ARE YOU TO NOD OFF OR FALL ASLEEP WHILE SITTING AND READING: SLIGHT CHANCE OF DOZING
HOW LIKELY ARE YOU TO NOD OFF OR FALL ASLEEP WHILE SITTING AND TALKING TO SOMEONE: WOULD NEVER DOZE
HOW LIKELY ARE YOU TO NOD OFF OR FALL ASLEEP WHILE SITTING INACTIVE IN A PUBLIC PLACE: SLIGHT CHANCE OF DOZING
HOW LIKELY ARE YOU TO NOD OFF OR FALL ASLEEP IN A CAR, WHILE STOPPED FOR A FEW MINUTES IN TRAFFIC: WOULD NEVER DOZE
DO YOU GENERALLY HAVE DIFFICULTY REMEMBERING THINGS BECAUSE YOU ARE SLEEPY OR TIRED: YES, A LITTLE
DO YOU HAVE DIFFICULTY BEING AS ACTIVE AS YOU WANT TO BE IN THE EVENING BECAUSE YOU ARE SLEEPY OR TIRED: NO
DO YOU HAVE DIFFICULTY WATCHING A MOVIE OR VIDEO BECAUSE YOU BECOME SLEEPY OR TIRED: NO
HOW LIKELY ARE YOU TO NOD OFF OR FALL ASLEEP WHILE SITTING QUIETLY AFTER LUNCH WITHOUT ALCOHOL: WOULD NEVER DOZE
DO YOU HAVE DIFFICULTY OPERATING A MOTOR VEHICLE FOR LONG DISTANCES (GREATER THAN 100 MILES) BECAUSE YOU BECOME SLEEPY: NO
FOSQ SCORE: 19
HOW LIKELY ARE YOU TO NOD OFF OR FALL ASLEEP WHEN YOU ARE A PASSENGER IN A CAR FOR AN HOUR WITHOUT A BREAK: SLIGHT CHANCE OF DOZING
HOW LIKELY ARE YOU TO NOD OFF OR FALL ASLEEP WHILE LYING DOWN TO REST IN THE AFTERNOON WHEN CIRCUMSTANCES PERMIT: MODERATE CHANCE OF DOZING
HOW LIKELY ARE YOU TO NOD OFF OR FALL ASLEEP WHILE SITTING INACTIVE IN A PUBLIC PLACE: SLIGHT CHANCE OF DOZING
DO YOU HAVE DIFFICULTY CONCENTRATING ON THE THINGS YOU DO BECAUSE YOU ARE SLEEPY OR TIRED: YES, A LITTLE
HOW LIKELY ARE YOU TO NOD OFF OR FALL ASLEEP WHILE SITTING AND READING: SLIGHT CHANCE OF DOZING
HAS YOUR MOOD BEEN AFFECTED BECAUSE YOU ARE SLEEPY OR TIRED: NO
HAS YOUR RELATIONSHIP WITH FAMILY, FRIENDS OR WORK COLLEAGUES BEEN AFFECTED BECAUSE YOU ARE SLEEPY OR TIRED: NO
HOW LIKELY ARE YOU TO NOD OFF OR FALL ASLEEP IN A CAR, WHILE STOPPED FOR A FEW MINUTES IN TRAFFIC: WOULD NEVER DOZE
HOW LIKELY ARE YOU TO NOD OFF OR FALL ASLEEP WHILE SITTING AND TALKING TO SOMEONE: WOULD NEVER DOZE

## 2024-07-08 NOTE — TELEPHONE ENCOUNTER
Requested Prescriptions     Refused Prescriptions Disp Refills    amiodarone (CORDARONE) 200 MG tablet [Pharmacy Med Name: AMIODARONE 200MG TABLETS] 30 tablet 1     Sig: START 2 TABLETS BY MOUTH DAILY FOR 2 WEEKS, THEN DECREASE TO 1 TABLET DAILY     Refused By: JEFFERSON WISEMAN     Reason for Refusal: Medication discontinued     Refill refused per verbal order ELIER Swan NP. Medication discontinued.

## 2024-07-11 ENCOUNTER — OFFICE VISIT (OUTPATIENT)
Age: 64
End: 2024-07-11
Payer: OTHER GOVERNMENT

## 2024-07-11 VITALS
SYSTOLIC BLOOD PRESSURE: 138 MMHG | HEART RATE: 64 BPM | BODY MASS INDEX: 41.75 KG/M2 | DIASTOLIC BLOOD PRESSURE: 96 MMHG | HEIGHT: 73 IN | WEIGHT: 315 LBS | TEMPERATURE: 98 F | OXYGEN SATURATION: 95 %

## 2024-07-11 DIAGNOSIS — G47.31 CENTRAL SLEEP APNEA: Primary | ICD-10-CM

## 2024-07-11 DIAGNOSIS — I10 HTN (HYPERTENSION), BENIGN: ICD-10-CM

## 2024-07-11 DIAGNOSIS — I48.19 PERSISTENT ATRIAL FIBRILLATION (HCC): ICD-10-CM

## 2024-07-11 DIAGNOSIS — Z86.79 H/O HEART FAILURE: ICD-10-CM

## 2024-07-11 PROCEDURE — 3080F DIAST BP >= 90 MM HG: CPT | Performed by: INTERNAL MEDICINE

## 2024-07-11 PROCEDURE — 3075F SYST BP GE 130 - 139MM HG: CPT | Performed by: INTERNAL MEDICINE

## 2024-07-11 PROCEDURE — 99214 OFFICE O/P EST MOD 30 MIN: CPT | Performed by: INTERNAL MEDICINE

## 2024-07-11 RX ORDER — ISOPROPYL ALCOHOL 0.75 G/1
SWAB TOPICAL
COMMUNITY
Start: 2024-06-29

## 2024-07-11 RX ORDER — LANCETS 28 GAUGE
EACH MISCELLANEOUS
COMMUNITY
Start: 2024-06-29

## 2024-07-11 RX ORDER — IBUPROFEN 400 MG/1
400 TABLET ORAL EVERY 6 HOURS PRN
COMMUNITY
Start: 2016-01-25

## 2024-07-11 NOTE — PROGRESS NOTES
[address], [place] [address]  Tel.  [phone]    Fax. [phone]     [address] [address]  Tel.  [phone]    Fax. [phone] [address] [place] [address]  Tel.  [phone]    Fax. [phone]       Stefan Calabrese Sr. (: 1960) is a 64 y.o. male, established patient, seen for positive airway pressure follow-up and evaluation of the following chief complaint(s):   Sleep Problem (1st Adherence)       Stefan Calabrese Sr. was last seen by me on 23, prior notes reviewed in detail.  Polysomnogram (PSG) performed on 23 was indicative of an average AHI of 104.3 per hour with an SpO2 danna of 87%, the duration of SpO2 <88% was 0.00 minutes.  Patient return for a CPAP/ Bilevel PAP titration on 10/10/2023.  Testing was indicative of CPAP / Bi-PAP failure due to persistence of central sleep apnea.  Severe PLM's were noted during the test.  Patient return for an ASV titration on 2024 and was adequately titrated, frequency of PLM's was noted to decrease from 47/h to 35/h with PLMS arousal index being 0/hour.  He was prescribed an ASV device and is here for his first adherence visit    ASSESSMENT/PLAN:   Diagnosis Orders   1. Central sleep apnea        2. Persistent atrial fibrillation (HCC)        3. HTN (hypertension), benign        4. H/O heart failure        5. BMI 40.0-44.9, adult (HCC)            ResMed:  AirCurve 11 ASV:      Settings:  Mode - ASVAuto    Min EPAP: 06 cmH2O  Max EPAP: 09 cmH2O    Min PS: 03 cmH2O  Max PS: 15 cmH2O      Sleep Apnea -   * He is adherent with PAP therapy and PAP continues to benefit patient and remains necessary for control of his sleep apnea.  Continue on current pressures    * Counseling was provided regarding the importance of regular PAP use with emphasis on ensuring sufficient total sleep time, proper sleep hygiene, and safe driving.    * Re-enforced proper and regular cleaning

## 2024-08-01 ENCOUNTER — PATIENT MESSAGE (OUTPATIENT)
Age: 64
End: 2024-08-01

## 2024-08-10 NOTE — PROGRESS NOTES
[facility] Cardiology  Cardiac Electrophysiology Clinic Care Note                  []Initial visit     [x]Established visit     Patient Name: Stefan Calabrese Sr. - :1960 - MRN:[id number]  Primary Cardiologist: Ramila Patel MD  Electrophysiologist: Brandee Sanchez MD     Reason for visit: Persistent AF/AFL follow up    HPI:  Mr. Calabrese is a 64 y.o. male who presents for follow up, is s/p PVI, GP, Ligament of Ranjit, & CTI ablation (2024-[facility]).     Stopped amiodarone 1 month post ablation.  No known recurrent AF.  However, he reports a recent cough & increased OSORIO over the past week or so since returning from a cruise.  Denies fever.    ECG today shows NSR 69 bpm with PACs.     Echo in 2024 showed LVEF 55-60% with severe LVH, mildly dilated RA, & mildly dilated aortic root (3.9 cm).     Event monitor (2 wk) in 2023 demonstrated 100% atrial fibrillation/a flutter burden with max heart rate of 161 bpm, average heart rate 79 bpm.  Multiple pauses noted longest lasting 3.6 seconds.      BP controlled.     Anticoagulated with Eliquis, denies bleeding issues.      Previous:  S/p PVI, GP, Ligament of Ranjit, & CTI ablation (2024-[facility]).     Hospitalized with AF/AFL in 10/2023 & 2023.     AF diagnosed in 2023.     Afib was diagnosed in 2023.  History of AF s/p CASEY/DCCV with recurrent Afib.  Multiple hospitalizations in 10/23 and .   EKG recently on 2024 demonstrated typical atrial flutter with variable AV conduction and ventricular rate of 82 bpm. Reports fatigue and OSORIO.       Assessment and Plan       ICD-10-CM    1. Persistent atrial fibrillation (HCC)  I48.19 EKG 12 Lead      2. Typical atrial flutter (HCC)  I48.3 EKG 12 Lead      3. Anticoagulated  Z79.01 EKG 12 Lead      4. Chronic diastolic CHF (congestive heart failure), NYHA class 2 ([facility])  I50.32 EKG 12 Lead      5. TOMMY (obstructive sleep apnea)  G47.33       6. Morbid obesity ([facility])  E66.01

## 2024-08-13 ENCOUNTER — PATIENT MESSAGE (OUTPATIENT)
Age: 64
End: 2024-08-13

## 2024-08-15 ENCOUNTER — TELEPHONE (OUTPATIENT)
Age: 64
End: 2024-08-15

## 2024-08-15 ENCOUNTER — OFFICE VISIT (OUTPATIENT)
Age: 64
End: 2024-08-15
Payer: OTHER GOVERNMENT

## 2024-08-15 VITALS
BODY MASS INDEX: 41.75 KG/M2 | HEIGHT: 73 IN | HEART RATE: 68 BPM | RESPIRATION RATE: 20 BRPM | OXYGEN SATURATION: 97 % | SYSTOLIC BLOOD PRESSURE: 130 MMHG | DIASTOLIC BLOOD PRESSURE: 88 MMHG | WEIGHT: 315 LBS

## 2024-08-15 DIAGNOSIS — G47.33 OSA (OBSTRUCTIVE SLEEP APNEA): ICD-10-CM

## 2024-08-15 DIAGNOSIS — E66.01 MORBID OBESITY (HCC): ICD-10-CM

## 2024-08-15 DIAGNOSIS — I48.3 TYPICAL ATRIAL FLUTTER (HCC): ICD-10-CM

## 2024-08-15 DIAGNOSIS — R05.1 ACUTE COUGH: ICD-10-CM

## 2024-08-15 DIAGNOSIS — I48.19 PERSISTENT ATRIAL FIBRILLATION (HCC): Primary | ICD-10-CM

## 2024-08-15 DIAGNOSIS — I50.32 CHRONIC DIASTOLIC CHF (CONGESTIVE HEART FAILURE), NYHA CLASS 2 (HCC): ICD-10-CM

## 2024-08-15 DIAGNOSIS — Z79.01 ANTICOAGULATED: ICD-10-CM

## 2024-08-15 PROCEDURE — 3079F DIAST BP 80-89 MM HG: CPT | Performed by: NURSE PRACTITIONER

## 2024-08-15 PROCEDURE — 93000 ELECTROCARDIOGRAM COMPLETE: CPT | Performed by: NURSE PRACTITIONER

## 2024-08-15 PROCEDURE — 99214 OFFICE O/P EST MOD 30 MIN: CPT | Performed by: NURSE PRACTITIONER

## 2024-08-15 PROCEDURE — 3075F SYST BP GE 130 - 139MM HG: CPT | Performed by: NURSE PRACTITIONER

## 2024-08-15 ASSESSMENT — PATIENT HEALTH QUESTIONNAIRE - PHQ9
SUM OF ALL RESPONSES TO PHQ QUESTIONS 1-9: 0
SUM OF ALL RESPONSES TO PHQ QUESTIONS 1-9: 0
2. FEELING DOWN, DEPRESSED OR HOPELESS: NOT AT ALL
1. LITTLE INTEREST OR PLEASURE IN DOING THINGS: NOT AT ALL
SUM OF ALL RESPONSES TO PHQ QUESTIONS 1-9: 0
SUM OF ALL RESPONSES TO PHQ9 QUESTIONS 1 & 2: 0
SUM OF ALL RESPONSES TO PHQ QUESTIONS 1-9: 0

## 2024-08-15 NOTE — TELEPHONE ENCOUNTER
Gave patient a copy of Sleep study results, office notes and pap compliance report.  Patient filled out and signed Release of Records.

## 2024-08-15 NOTE — PROGRESS NOTES
Room #: 5    Chief Complaint   Patient presents with    Atrial Fibrillation     Atrial Flutter    Congestive Heart Failure       Vitals:    08/15/24 1311   BP: 130/88   Site: Left Upper Arm   Position: Sitting   Cuff Size: Large Adult   Pulse: 68   Resp: 20   SpO2: 97%   Weight: (!) 151.8 kg (334 lb 9.6 oz)   Height: 1.854 m (6' 1\")         Chest pain:  pressure    Have you been to the ER, urgent care, or hospitalized outside of [facility] since your last visit?   NO    Refills:  NO

## 2024-08-23 DIAGNOSIS — I50.32 CHRONIC HEART FAILURE WITH PRESERVED EJECTION FRACTION (HCC): ICD-10-CM

## 2024-08-28 RX ORDER — METOPROLOL SUCCINATE 25 MG/1
12.5 TABLET, EXTENDED RELEASE ORAL DAILY
Qty: 45 TABLET | Refills: 3 | Status: SHIPPED | OUTPATIENT
Start: 2024-08-28

## 2024-10-10 ENCOUNTER — PATIENT MESSAGE (OUTPATIENT)
Age: 64
End: 2024-10-10

## 2024-10-10 DIAGNOSIS — E11.65 TYPE 2 DIABETES MELLITUS WITH HYPERGLYCEMIA, WITH LONG-TERM CURRENT USE OF INSULIN (HCC): ICD-10-CM

## 2024-10-10 DIAGNOSIS — E66.813 CLASS 3 SEVERE OBESITY DUE TO EXCESS CALORIES IN ADULT: ICD-10-CM

## 2024-10-10 DIAGNOSIS — I50.32 CHRONIC HEART FAILURE WITH PRESERVED EJECTION FRACTION (HCC): ICD-10-CM

## 2024-10-10 DIAGNOSIS — Z79.4 TYPE 2 DIABETES MELLITUS WITH HYPERGLYCEMIA, WITH LONG-TERM CURRENT USE OF INSULIN (HCC): ICD-10-CM

## 2024-10-10 DIAGNOSIS — E66.01 CLASS 3 SEVERE OBESITY DUE TO EXCESS CALORIES IN ADULT: ICD-10-CM

## 2024-10-10 DIAGNOSIS — I48.19 PERSISTENT ATRIAL FIBRILLATION (HCC): ICD-10-CM

## 2024-10-10 DIAGNOSIS — I10 HTN (HYPERTENSION), BENIGN: Primary | ICD-10-CM

## 2024-10-15 NOTE — TELEPHONE ENCOUNTER
Treadmill stress test scheduled with the patient at [facility] for next week.

## 2024-11-01 RX ORDER — SACUBITRIL AND VALSARTAN 97; 103 MG/1; MG/1
1 TABLET, FILM COATED ORAL 2 TIMES DAILY
Qty: 180 TABLET | Refills: 0 | Status: SHIPPED | OUTPATIENT
Start: 2024-11-01

## 2024-11-01 NOTE — TELEPHONE ENCOUNTER
Per verbal order from Dr. Ramila Sheets  Last appt: 6/18/2024     Future Appointments   Date Time Provider Department Center   11/14/2024 11:40 AM Ramila Sheets, MD [facility] BS AMB   5/8/2025  1:00 PM Brandee Sanchez MD [facility] BS AMB   7/8/2025 11:20 AM Nurys Lgegett MD [facility] BS AMB       Requested Prescriptions     Signed Prescriptions Disp Refills    sacubitril-valsartan (ENTRESTO)  MG per tablet 180 tablet 0     Sig: Take 1 tablet by mouth 2 times daily     Authorizing Provider: RAMILA SHEETS     Ordering User: TERRELL ALMARAZ

## 2024-11-14 ENCOUNTER — OFFICE VISIT (OUTPATIENT)
Age: 64
End: 2024-11-14
Payer: OTHER GOVERNMENT

## 2024-11-14 VITALS
RESPIRATION RATE: 16 BRPM | OXYGEN SATURATION: 95 % | HEART RATE: 81 BPM | SYSTOLIC BLOOD PRESSURE: 120 MMHG | BODY MASS INDEX: 41.75 KG/M2 | WEIGHT: 315 LBS | DIASTOLIC BLOOD PRESSURE: 80 MMHG | HEIGHT: 73 IN

## 2024-11-14 DIAGNOSIS — E66.01 MORBID OBESITY: ICD-10-CM

## 2024-11-14 DIAGNOSIS — Z79.4 TYPE 2 DIABETES MELLITUS WITH HYPERGLYCEMIA, WITH LONG-TERM CURRENT USE OF INSULIN (HCC): ICD-10-CM

## 2024-11-14 DIAGNOSIS — I50.32 CHRONIC DIASTOLIC CHF (CONGESTIVE HEART FAILURE), NYHA CLASS 2 (HCC): ICD-10-CM

## 2024-11-14 DIAGNOSIS — Z79.01 ANTICOAGULATION ADEQUATE: ICD-10-CM

## 2024-11-14 DIAGNOSIS — I10 HTN (HYPERTENSION), BENIGN: Primary | ICD-10-CM

## 2024-11-14 DIAGNOSIS — E11.65 TYPE 2 DIABETES MELLITUS WITH HYPERGLYCEMIA, WITH LONG-TERM CURRENT USE OF INSULIN (HCC): ICD-10-CM

## 2024-11-14 DIAGNOSIS — I48.3 TYPICAL ATRIAL FLUTTER (HCC): ICD-10-CM

## 2024-11-14 DIAGNOSIS — G47.31 CENTRAL SLEEP APNEA: ICD-10-CM

## 2024-11-14 PROCEDURE — 99214 OFFICE O/P EST MOD 30 MIN: CPT | Performed by: INTERNAL MEDICINE

## 2024-11-14 PROCEDURE — 3074F SYST BP LT 130 MM HG: CPT | Performed by: INTERNAL MEDICINE

## 2024-11-14 PROCEDURE — 3079F DIAST BP 80-89 MM HG: CPT | Performed by: INTERNAL MEDICINE

## 2024-11-14 RX ORDER — COLCHICINE 0.6 MG/1
0.6 TABLET ORAL AS NEEDED
COMMUNITY
Start: 2024-11-12

## 2024-11-14 ASSESSMENT — PATIENT HEALTH QUESTIONNAIRE - PHQ9
SUM OF ALL RESPONSES TO PHQ QUESTIONS 1-9: 0
SUM OF ALL RESPONSES TO PHQ9 QUESTIONS 1 & 2: 0
2. FEELING DOWN, DEPRESSED OR HOPELESS: NOT AT ALL
1. LITTLE INTEREST OR PLEASURE IN DOING THINGS: NOT AT ALL
SUM OF ALL RESPONSES TO PHQ QUESTIONS 1-9: 0

## 2024-11-14 NOTE — PROGRESS NOTES
Per Dr. Anshul hernandes 6 months.   
pre-diabetes, recent pneumonia, who presents with chief c/o of SOB and wheezing.  Reports he has been SOB for past few months since he was diagnosed with pneumonia 3 months ago.  Reports some BLE edema.  No chest pain reported.  He reports he had a sleep study over 5 years ago which did not demonstrate TOMMY but he has since gained weight.  In ER, his CXR showed cardiomegaly but no acute intrathoracic disease.  BNP was 424, Ddimer 0.88.   He was also noted to be in afib, rate controlled on 12 lead EKG.  He denies any prior history of afib. A1c=7.6.  HDL 49, .8, triglyceride 106, creatinine 1.25. Hgb 13.8. Mg=2.2, K hemolyzed. Creatinine was 1.41 on admission now 1.25.     In the interim, saw Kylah Carcamo Pharmacist Dr. Espino who recommended switching telmisartan to Entresto, beta-blocker to long-acting Toprol, which we did.  He was initiated on step 1 Entresto and Toprol XL 50.     He was at the dentist and his blood pressure was elevated.  138/110.       Discussed continuing course - weight loss, TOMMY eval.  Discussed ablation and watchman, possibly down the road.      In the interim, gouty flairs -two hospitalization gouty flair R knee, drained. Prednisone tapered off.  Colchicine added.  Lasix stopped.   HR 40-50 Toprol decreased to 50.  EKGs have shown NSR, PACS.       TOMMY - centrals severe TOMMY - not a candidate of INSPIRE, Plans for Phrenic Nerve Stimulation (REMEDE).   Dr. Leggett.        Denies chest pain, edema, syncope, shortness of breath at rest, dyspnea on exertion, PND or orthopnea.  Has no tachycardia, palpitations or sense of arrhythmia.     PAST MEDICAL HISTORY:     Past Medical History:   Diagnosis Date    Atrial fibrillation (HCC)     Diabetes mellitus (HCC)     Hypertension     Typical atrial flutter (HCC)        Past Surgical History:   Procedure Laterality Date    CARDIAC PROCEDURE N/A 5/24/2024    Intracardiac echocardiogram performed by Brandee Sanchez MD at [facility] CARDIAC CATH LAB    CERVICAL FUSION

## 2025-01-15 ENCOUNTER — PATIENT MESSAGE (OUTPATIENT)
Age: 65
End: 2025-01-15

## 2025-01-22 DIAGNOSIS — I10 HTN (HYPERTENSION), BENIGN: ICD-10-CM

## 2025-01-22 RX ORDER — NIFEDIPINE 60 MG/1
60 TABLET, EXTENDED RELEASE ORAL DAILY
Qty: 90 TABLET | Refills: 1 | Status: SHIPPED | OUTPATIENT
Start: 2025-01-22

## 2025-01-22 RX ORDER — SACUBITRIL AND VALSARTAN 97; 103 MG/1; MG/1
1 TABLET, FILM COATED ORAL 2 TIMES DAILY
Qty: 180 TABLET | Refills: 1 | Status: SHIPPED | OUTPATIENT
Start: 2025-01-22

## 2025-01-22 RX ORDER — SPIRONOLACTONE 25 MG/1
12.5 TABLET ORAL DAILY
Qty: 45 TABLET | Refills: 1 | Status: SHIPPED | OUTPATIENT
Start: 2025-01-22

## 2025-03-24 ENCOUNTER — TELEPHONE (OUTPATIENT)
Age: 65
End: 2025-03-24

## 2025-03-24 NOTE — TELEPHONE ENCOUNTER
Enrolled with [employer] - Ordered and being shipped to patient's home address on file.  ETA within 5-7 Business Days.        7 day holter  Received: Today  Darshana Ortiz, RN  Tamra Calabrese; Flor Forman  Please send 7 day holter 04/2025 per Dr. Sanchez dx: AF    Future Appointments  7/11/2024  11:40 AM   Nurys Leggett MD           [facility]             BS AMB  8/15/2024  1:40 PM    Sanna Bashir, APRN* [facility]               BS AMB  11/14/2024 11:40 AM   Ramila Patel MD [facility]              BS AMB  5/8/2025   1:00 PM    Brandee Sanchez MD [facility]              BS AMB  
 delivery delivered

## 2025-04-11 ENCOUNTER — TELEPHONE (OUTPATIENT)
Age: 65
End: 2025-04-11

## 2025-04-11 NOTE — TELEPHONE ENCOUNTER
Concha Alexander, KANDI - NP       Please call pt. 7 second pause at night. 3.8 second pause during the day. Stop metoprolol.    Please schedule dual chamber pacemaker ASAP    Thanks!     Called patient, ID verified using two patient identifiers.  Notified patient of monitor results.  He states he was not wearing his CPAP at the time of the monitor.  He just started wearing it again consistently this week 4/7/25.    Instructed patient to where his CPAP nightly.  He should stop his metoprolol at this time.    We will call him back with Dr. Sanchez's recommendations regarding the pacemaker.    Patient verbalized understanding and will call back with any other questions or concerns.

## 2025-04-11 NOTE — TELEPHONE ENCOUNTER
Received 7 day holter alert that was ordered by Dr Sanchez dx:  AF/AFL.    3/28/25 - 4/4/25  Technician Findings  The patient was monitored for a total of 6d 23h, underlying rhythm is sinus.  Possible Junctional Rhythm noted.  The minimum heart rate was 40 bpm; the maximum 126 bpm; the average 66 bpm.  0 % of Atrial fibrillation/Atrial flutter with longest episode of 0 ms.  The total burden of AV Block present was 0 % [Complete Heart Block: 0 %; Advanced (High Grade):  0 %; 2nd Degree, Mobitz II: 0 %; 2nd Degree, Mobitz I: 0 %].  There were 53 pauses, the longest pause was 7056 ms at Day 7 / 02:15:29 am.  Total count of Ventricular Tachycardia (VT): 0 episode(s). Longest VT: 0 s on --. Fastest Ventricular Run: -- bpm  on --. Total Count of Ventricular Episodes <100bpm: 0 episode(s). Longest Ventricular Event <100bpm: 0 s  on --  77 supraventricular episodes were found. Longest SVT Episode 34 beats, Fastest  bpm  There were a total of 5758 PVCs with 3 morphologies and 34 couplets. Overall PVC [place] at 0.87 %  There were a total of 0 Other Beats. There were 0 total number of paced beats.  There were a total of 67067 PSVCs with 1143 couplets. Overall PSVC [place] at 3.23 %  There is a total of 0 patient events      Report is attached to order & assigned to Dr Sanchez to read.  Informed Dr Sanchez & Concha Alexander NP

## 2025-04-14 NOTE — TELEPHONE ENCOUNTER
Brandee Sanchez MD  You; You; Concha Alexander, KANDI - NP; Марина Saeed, LPN; Lorrie Harperh4 hours ago (12:53 PM)       Can we make sure this patient is contacted and scheduled for dual chamber PPM for SSS. Thanks.     Brandee Sanchez MD  You; Concha Alexander APRN - NP3 days ago     That's too long of a pause for me to feel comfortable to rely on just CPAP, let's go ahead and plan for a PPM please. Thanks.     Called patient, ID verified using two patient identifiers.  Notified of Dr. Sanchez's recommendations above.  Mr. Calabrese would like an appointment to discuss the procedure prior to proceeding.  Appointment made with Dr. Sanchez this week.    Discussed the procedure, restrictions, length of procedure, etc.    Patient verbalized understanding and will call back with any other questions or concerns.    Future Appointments   Date Time Provider Department Center   4/16/2025  2:00 PM Brandee Sanchez MD [facility] BS AMB   5/8/2025  1:00 PM Brandee Sanchez MD [facility] BS AMB   5/15/2025 11:20 AM Ramila Patel MD [facility] BS AMB   7/8/2025 11:20 AM Nurys Leggett MD [facility] BS AMB

## 2025-04-16 ENCOUNTER — OFFICE VISIT (OUTPATIENT)
Age: 65
End: 2025-04-16
Payer: OTHER GOVERNMENT

## 2025-04-16 ENCOUNTER — TELEPHONE (OUTPATIENT)
Age: 65
End: 2025-04-16

## 2025-04-16 VITALS
OXYGEN SATURATION: 95 % | RESPIRATION RATE: 16 BRPM | HEART RATE: 80 BPM | SYSTOLIC BLOOD PRESSURE: 140 MMHG | DIASTOLIC BLOOD PRESSURE: 88 MMHG | WEIGHT: 315 LBS | HEIGHT: 73 IN | BODY MASS INDEX: 41.75 KG/M2

## 2025-04-16 DIAGNOSIS — I48.3 TYPICAL ATRIAL FLUTTER (HCC): ICD-10-CM

## 2025-04-16 DIAGNOSIS — I49.5 SINOATRIAL NODE DYSFUNCTION (HCC): ICD-10-CM

## 2025-04-16 DIAGNOSIS — I50.32 CHRONIC DIASTOLIC CHF (CONGESTIVE HEART FAILURE), NYHA CLASS 2 (HCC): ICD-10-CM

## 2025-04-16 DIAGNOSIS — Z79.01 ANTICOAGULATION ADEQUATE: ICD-10-CM

## 2025-04-16 DIAGNOSIS — I48.19 PERSISTENT ATRIAL FIBRILLATION (HCC): Primary | ICD-10-CM

## 2025-04-16 DIAGNOSIS — Z79.4 TYPE 2 DIABETES MELLITUS WITH HYPERGLYCEMIA, WITH LONG-TERM CURRENT USE OF INSULIN (HCC): ICD-10-CM

## 2025-04-16 DIAGNOSIS — I10 HTN (HYPERTENSION), BENIGN: ICD-10-CM

## 2025-04-16 DIAGNOSIS — E66.01 MORBID OBESITY: ICD-10-CM

## 2025-04-16 DIAGNOSIS — E11.65 TYPE 2 DIABETES MELLITUS WITH HYPERGLYCEMIA, WITH LONG-TERM CURRENT USE OF INSULIN (HCC): ICD-10-CM

## 2025-04-16 PROCEDURE — 3077F SYST BP >= 140 MM HG: CPT | Performed by: INTERNAL MEDICINE

## 2025-04-16 PROCEDURE — G2211 COMPLEX E/M VISIT ADD ON: HCPCS | Performed by: INTERNAL MEDICINE

## 2025-04-16 PROCEDURE — 99214 OFFICE O/P EST MOD 30 MIN: CPT | Performed by: INTERNAL MEDICINE

## 2025-04-16 PROCEDURE — 3079F DIAST BP 80-89 MM HG: CPT | Performed by: INTERNAL MEDICINE

## 2025-04-16 PROCEDURE — 93010 ELECTROCARDIOGRAM REPORT: CPT | Performed by: INTERNAL MEDICINE

## 2025-04-16 PROCEDURE — 93005 ELECTROCARDIOGRAM TRACING: CPT | Performed by: INTERNAL MEDICINE

## 2025-04-16 RX ORDER — CHLORHEXIDINE GLUCONATE 40 MG/ML
SOLUTION TOPICAL NIGHTLY
Qty: 1 EACH | Refills: 0 | Status: SHIPPED | OUTPATIENT
Start: 2025-04-16

## 2025-04-16 ASSESSMENT — PATIENT HEALTH QUESTIONNAIRE - PHQ9: DEPRESSION UNABLE TO ASSESS: PT REFUSES

## 2025-04-16 NOTE — PROGRESS NOTES
Chief Complaint   Patient presents with    Atrial Fibrillation    Hypertension    Congestive Heart Failure     A- Flutter     No recent ER or Hospital visits    Denies Chest Pain, Dizziness,Shortness of Breath.

## 2025-04-16 NOTE — PATIENT INSTRUCTIONS
CPAP compliance  Stop Metoprolol  Schedule for dual chamber pacemaker with Medtronic next available  Hold Eliquis 2 days prior    You are scheduled for the following procedure with Dr. Sanchez:  New Implant Dual Chamber PPM at [facility], [address] [place] [address]     PLEASE be aware that your procedure date/time is tentative and subject to change due to emergency cases.    Any changes to your procedure date/time will be communicated by the hospital staff.      Procedure date/time:    Monday, May 12, 2025 at  8:00 am - please arrive by  7:30 am      ARRIVAL time:  (You will need someone to drive you home.)     [X]  [facility] procedures: please arrive to check in on 2nd floor one hour prior to your procedure the day of your procedure.      Pre-procedure Labs/Imaging:    PRE-PROCEDURE LABS NEEDED: Yes - please have these done after 4/12/25 and before 5/5/25  Forms for labwork may be given at appointment. If not, they will be mailed to you.  [facility].      [facility]  [address], [address]  [place], [place] [address]  Monday-Friday 730A-430P (closed 1230-130P)  [phone]    [address], [address]   [place] [address]  Monday-Friday 8:00AM - 5:00 PM   [phone]    [facility]  [address], [address]  [place], [place] [address]  Monday-Friday 7A-5P  [phone]    [facility]  [address], [address]  [place], [place] [address]  Monday-Friday 730A-430P  [phone] (closed 1-2P)    [facility]  [address] , [address]  [place], [place] [address]  Monday-Friday 730A-430P (closed 1230-130P)  [phone]    [facility]  [address] [place], [place] [address]  Monday-Friday 7A-430P  [phone]    [facility]  [address]

## 2025-04-16 NOTE — TELEPHONE ENCOUNTER
Spoke to Pt Roman CalabresecProcedure with Dr. Sanchez on 5/12/25  at 8am arriving at 7am. Please NPO from Midnight the night prior to the procedure. Please have lab work done Between 4/16/25-5/5/25. Check in at the 2nd floor OutPt Registation desk at [facility].    Medications:  Hold eliquis the morning of the procedure   Hold spironolactone the morning of the procedure   Hold ozempic 1 WEEK prior to the procedure      VO BY Dr. Sanchez/Nurse Heather TOMLINSON

## 2025-04-16 NOTE — PROGRESS NOTES
Cardiac Electrophysiology OFFICE Follow-up Note       Assessment/Plan:   1. Persistent atrial fibrillation (HCC)  -     EKG 12 Lead  2. Typical atrial flutter (HCC)  -     EKG 12 Lead  3. HTN (hypertension), benign  -     EKG 12 Lead  4. Chronic diastolic CHF (congestive heart failure), NYHA class 2 (HCC)  -     EKG 12 Lead  5. Type 2 diabetes mellitus with hyperglycemia, with long-term current use of insulin (HCC)  -     EKG 12 Lead  6. Anticoagulation adequate  -     EKG 12 Lead  7. Morbid obesity  -     EKG 12 Lead           Primary Cardiologist: Jorge        Sinus pauses/SSS  - One week monitor March 2025 showed 53 pauses noted, longest lasting 7.06 seconds on 4/3/25 at 2:15 AM. 5.77 seconds pause noted on 3/29/25 at 7:30 AM, 3.8 seconds pause on 3/29/25 at 12:45 PM consistent with sinus pause, 5.9 seconds pause on 4/3/25 at 5:31 AM.  - Recommend dual chamber pacemaker   - I have discussed the risks and benefits of pacemaker implant with the patient including but not limited to MI, death, bleeding, infection, lead dislodgement, pneumothorax, tamponade. Patient reports complete understanding of discussions and recommendations and wish to proceed with the procedure.  - no longer on Metoprolol  - emphasize CPAP compliance  - hold Eliquis 2 day sprior  - schedule for dual chamber PPM next available     Persistent atrial fibrillation/typical atrial flutter  -Progressive worsening persistent atrial fibrillation and concurrent typical atrial flutter.   -Multiple hospitalizations and prior CASEY/cardioversion.   -Event monitor in 12/2023 demonstrated 100% atrial fibrillation burden. Prior monitor also demonstrated pauses longest lasting 3.6 seconds. Also history of diastolic congestive heart failure.  -S/p PVI, GP, Ligament of Ranjit, & CTI ablation (05/24/2024-Sanchez).  - I'm pleased to hear how well the patient has done post ablation. We spoke in great detail regarding the importance of multidisciplinary management

## 2025-04-22 ENCOUNTER — TELEPHONE (OUTPATIENT)
Age: 65
End: 2025-04-22

## 2025-04-22 NOTE — TELEPHONE ENCOUNTER
Patient requested a call back due to the him getting a  random call from one of our staff saying his referral is  and he said it doesn't make sense because it was juist sent on 25        Contact Information  [phone] (Mobile)   [phone] (Home Phone)

## 2025-04-23 NOTE — TELEPHONE ENCOUNTER
Returned pt phone call and s/w pt, x2 pt identifiers verified. Pt reported someone from the office called him yesterday and told him \"don't worry about it\" and that our office will handle this. This referral needs to come from his PCM and is not something our office can put in through . Pt was informed that he does need a referral from his PCM and there is not an active referral in his chart. Pt verbalized understanding and will be calling his PCM regarding this.

## 2025-04-24 DIAGNOSIS — I10 HTN (HYPERTENSION), BENIGN: ICD-10-CM

## 2025-04-24 DIAGNOSIS — I48.3 TYPICAL ATRIAL FLUTTER (HCC): ICD-10-CM

## 2025-04-24 DIAGNOSIS — I50.32 CHRONIC DIASTOLIC CHF (CONGESTIVE HEART FAILURE), NYHA CLASS 2 (HCC): ICD-10-CM

## 2025-04-24 DIAGNOSIS — Z79.01 ANTICOAGULATION ADEQUATE: ICD-10-CM

## 2025-04-24 DIAGNOSIS — E11.65 TYPE 2 DIABETES MELLITUS WITH HYPERGLYCEMIA, WITH LONG-TERM CURRENT USE OF INSULIN (HCC): ICD-10-CM

## 2025-04-24 DIAGNOSIS — E66.01 MORBID OBESITY (HCC): ICD-10-CM

## 2025-04-24 DIAGNOSIS — Z79.4 TYPE 2 DIABETES MELLITUS WITH HYPERGLYCEMIA, WITH LONG-TERM CURRENT USE OF INSULIN (HCC): ICD-10-CM

## 2025-04-24 DIAGNOSIS — I48.19 PERSISTENT ATRIAL FIBRILLATION (HCC): ICD-10-CM

## 2025-04-25 LAB
ANION GAP SERPL CALC-SCNC: 5 MMOL/L (ref 2–12)
BUN SERPL-MCNC: 16 MG/DL (ref 6–20)
BUN/CREAT SERPL: 15 (ref 12–20)
CALCIUM SERPL-MCNC: 9.5 MG/DL (ref 8.5–10.1)
CHLORIDE SERPL-SCNC: 112 MMOL/L (ref 97–108)
CO2 SERPL-SCNC: 24 MMOL/L (ref 21–32)
CREAT SERPL-MCNC: 1.08 MG/DL (ref 0.7–1.3)
ERYTHROCYTE [DISTWIDTH] IN BLOOD BY AUTOMATED COUNT: 17.5 % (ref 11.5–14.5)
GLUCOSE SERPL-MCNC: 111 MG/DL (ref 65–100)
HCT VFR BLD AUTO: 49.3 % (ref 36.6–50.3)
HGB BLD-MCNC: 14.2 G/DL (ref 12.1–17)
MCH RBC QN AUTO: 23 PG (ref 26–34)
MCHC RBC AUTO-ENTMCNC: 28.8 G/DL (ref 30–36.5)
MCV RBC AUTO: 79.8 FL (ref 80–99)
NRBC # BLD: 0 K/UL (ref 0–0.01)
NRBC BLD-RTO: 0 PER 100 WBC
PLATELET # BLD AUTO: 216 K/UL (ref 150–400)
PMV BLD AUTO: 10.2 FL (ref 8.9–12.9)
POTASSIUM SERPL-SCNC: 3.9 MMOL/L (ref 3.5–5.1)
RBC # BLD AUTO: 6.18 M/UL (ref 4.1–5.7)
SODIUM SERPL-SCNC: 141 MMOL/L (ref 136–145)
WBC # BLD AUTO: 4.9 K/UL (ref 4.1–11.1)

## 2025-05-07 ENCOUNTER — TELEPHONE (OUTPATIENT)
Age: 65
End: 2025-05-07

## 2025-05-07 NOTE — TELEPHONE ENCOUNTER
Verified patient with two types of identifiers. Patient scheduled for a dual chamber ppm on 5/12/25 at 8:30 am. Patient will arrive to outpatient registration on 2nd floor at [facility] at 7:00 AM. Verified patient already stopped his Ozempic. Patient will hold Eliquis 2 days prior to his procedure and Spironolactone the AM of his procedure. Patient has an appointment with Dr. Sanchez scheduled for tomorrow. Verified that this was scheduled a year ago. Patient saw Dr. Sanchez in office on 4/16/25. Notified patient that unless he has further questions he would like to discuss with MD we can cancel that appointment. Patient states he does not have any further questions and would like to cancel appointment. Patient verbalized understanding and will call with any other questions.      Future Appointments   Date Time Provider Department Center   5/15/2025 11:20 AM Ramila Patel MD [facility] AM[facility]   5/27/2025 10:20 AM PACEMAKER, [facility]FRANCES [facility] AM[facility]   6/30/2025 11:00 AM PACEMAKER, [facility] AM[facility]   6/30/2025 11:20 AM Sanna Bashir APRN - NP [facility] AM[facility]   7/8/2025 11:20 AM Nurys Leggett MD [facility] AM[facility]

## 2025-05-08 ENCOUNTER — PREP FOR PROCEDURE (OUTPATIENT)
Age: 65
End: 2025-05-08

## 2025-05-08 RX ORDER — SODIUM CHLORIDE 9 MG/ML
INJECTION, SOLUTION INTRAVENOUS PRN
Status: CANCELLED | OUTPATIENT
Start: 2025-05-08

## 2025-05-08 RX ORDER — SODIUM CHLORIDE 0.9 % (FLUSH) 0.9 %
5-40 SYRINGE (ML) INJECTION PRN
Status: CANCELLED | OUTPATIENT
Start: 2025-05-08

## 2025-05-08 RX ORDER — SODIUM CHLORIDE 0.9 % (FLUSH) 0.9 %
5-40 SYRINGE (ML) INJECTION EVERY 12 HOURS SCHEDULED
Status: CANCELLED | OUTPATIENT
Start: 2025-05-08

## 2025-05-12 ENCOUNTER — HOSPITAL ENCOUNTER (OUTPATIENT)
Facility: HOSPITAL | Age: 65
Setting detail: OUTPATIENT SURGERY
Discharge: HOME | End: 2025-05-12
Attending: INTERNAL MEDICINE | Admitting: INTERNAL MEDICINE
Payer: OTHER GOVERNMENT

## 2025-05-12 ENCOUNTER — APPOINTMENT (OUTPATIENT)
Facility: HOSPITAL | Age: 65
End: 2025-05-12
Attending: INTERNAL MEDICINE
Payer: OTHER GOVERNMENT

## 2025-05-12 VITALS
RESPIRATION RATE: 20 BRPM | OXYGEN SATURATION: 91 % | BODY MASS INDEX: 41.75 KG/M2 | SYSTOLIC BLOOD PRESSURE: 126 MMHG | WEIGHT: 315 LBS | TEMPERATURE: 97.9 F | DIASTOLIC BLOOD PRESSURE: 75 MMHG | HEIGHT: 73 IN | HEART RATE: 73 BPM

## 2025-05-12 DIAGNOSIS — I49.5 SINOATRIAL NODE DYSFUNCTION (HCC): ICD-10-CM

## 2025-05-12 LAB
ECHO BSA: 2.8 M2
GLUCOSE BLD STRIP.AUTO-MCNC: 108 MG/DL (ref 65–117)
SERVICE CMNT-IMP: NORMAL

## 2025-05-12 PROCEDURE — 2500000003 HC RX 250 WO HCPCS: Performed by: INTERNAL MEDICINE

## 2025-05-12 PROCEDURE — 76937 US GUIDE VASCULAR ACCESS: CPT | Performed by: INTERNAL MEDICINE

## 2025-05-12 PROCEDURE — 2709999900 HC NON-CHARGEABLE SUPPLY: Performed by: INTERNAL MEDICINE

## 2025-05-12 PROCEDURE — 99152 MOD SED SAME PHYS/QHP 5/>YRS: CPT | Performed by: INTERNAL MEDICINE

## 2025-05-12 PROCEDURE — 33213 INSERT PULSE GEN DUAL LEADS: CPT | Performed by: INTERNAL MEDICINE

## 2025-05-12 PROCEDURE — 6370000000 HC RX 637 (ALT 250 FOR IP)

## 2025-05-12 PROCEDURE — 82962 GLUCOSE BLOOD TEST: CPT

## 2025-05-12 PROCEDURE — 99153 MOD SED SAME PHYS/QHP EA: CPT | Performed by: INTERNAL MEDICINE

## 2025-05-12 PROCEDURE — 71045 X-RAY EXAM CHEST 1 VIEW: CPT

## 2025-05-12 PROCEDURE — C1785 PMKR, DUAL, RATE-RESP: HCPCS | Performed by: INTERNAL MEDICINE

## 2025-05-12 PROCEDURE — C1898 LEAD, PMKR, OTHER THAN TRANS: HCPCS | Performed by: INTERNAL MEDICINE

## 2025-05-12 PROCEDURE — C1892 INTRO/SHEATH,FIXED,PEEL-AWAY: HCPCS | Performed by: INTERNAL MEDICINE

## 2025-05-12 PROCEDURE — 6360000002 HC RX W HCPCS: Performed by: INTERNAL MEDICINE

## 2025-05-12 PROCEDURE — 33208 INSRT HEART PM ATRIAL & VENT: CPT | Performed by: INTERNAL MEDICINE

## 2025-05-12 DEVICE — LEAD 5076-52 MRI US RCMCRD
Type: IMPLANTABLE DEVICE | Status: FUNCTIONAL
Brand: CAPSUREFIX NOVUS MRI™ SURESCAN®

## 2025-05-12 DEVICE — IPG W1DR01 AZURE XT DR MRI USA
Type: IMPLANTABLE DEVICE | Status: FUNCTIONAL
Brand: AZURE™ XT DR MRI SURESCAN™

## 2025-05-12 DEVICE — LEAD 5076-58 MRI US RCMCRD
Type: IMPLANTABLE DEVICE | Status: FUNCTIONAL
Brand: CAPSUREFIX NOVUS MRI™ SURESCAN®

## 2025-05-12 RX ORDER — LIDOCAINE HYDROCHLORIDE 10 MG/ML
INJECTION, SOLUTION INFILTRATION; PERINEURAL PRN
Status: DISCONTINUED | OUTPATIENT
Start: 2025-05-12 | End: 2025-05-12 | Stop reason: HOSPADM

## 2025-05-12 RX ORDER — MIDAZOLAM HYDROCHLORIDE 1 MG/ML
INJECTION, SOLUTION INTRAMUSCULAR; INTRAVENOUS PRN
Status: DISCONTINUED | OUTPATIENT
Start: 2025-05-12 | End: 2025-05-12 | Stop reason: HOSPADM

## 2025-05-12 RX ORDER — SODIUM CHLORIDE 0.9 % (FLUSH) 0.9 %
5-40 SYRINGE (ML) INJECTION EVERY 12 HOURS SCHEDULED
Status: DISCONTINUED | OUTPATIENT
Start: 2025-05-12 | End: 2025-05-12 | Stop reason: HOSPADM

## 2025-05-12 RX ORDER — SODIUM CHLORIDE 0.9 % (FLUSH) 0.9 %
5-40 SYRINGE (ML) INJECTION PRN
Status: DISCONTINUED | OUTPATIENT
Start: 2025-05-12 | End: 2025-05-12 | Stop reason: HOSPADM

## 2025-05-12 RX ORDER — ONDANSETRON 2 MG/ML
4 INJECTION INTRAMUSCULAR; INTRAVENOUS EVERY 6 HOURS PRN
Status: DISCONTINUED | OUTPATIENT
Start: 2025-05-12 | End: 2025-05-12 | Stop reason: HOSPADM

## 2025-05-12 RX ORDER — FENTANYL CITRATE 50 UG/ML
INJECTION, SOLUTION INTRAMUSCULAR; INTRAVENOUS PRN
Status: DISCONTINUED | OUTPATIENT
Start: 2025-05-12 | End: 2025-05-12 | Stop reason: HOSPADM

## 2025-05-12 RX ORDER — ACETAMINOPHEN 325 MG/1
650 TABLET ORAL EVERY 4 HOURS PRN
Status: DISCONTINUED | OUTPATIENT
Start: 2025-05-12 | End: 2025-05-12 | Stop reason: HOSPADM

## 2025-05-12 RX ORDER — SODIUM CHLORIDE 9 MG/ML
INJECTION, SOLUTION INTRAVENOUS PRN
Status: DISCONTINUED | OUTPATIENT
Start: 2025-05-12 | End: 2025-05-12 | Stop reason: HOSPADM

## 2025-05-12 RX ORDER — CEFAZOLIN SODIUM 1 G/3ML
INJECTION, POWDER, FOR SOLUTION INTRAMUSCULAR; INTRAVENOUS PRN
Status: DISCONTINUED | OUTPATIENT
Start: 2025-05-12 | End: 2025-05-12 | Stop reason: HOSPADM

## 2025-05-12 RX ADMIN — ACETAMINOPHEN 650 MG: 325 TABLET ORAL at 11:38

## 2025-05-12 ASSESSMENT — PAIN SCALES - GENERAL: PAINLEVEL_OUTOF10: 3

## 2025-05-12 NOTE — H&P
Interval H&P:  No significant interval changes since patient was last seen in clinic.  I have discussed the risks and benefits of pacemaker implant with the patient including but not limited to MI, death, bleeding, infection, lead dislodgement, pneumothorax, tamponade. Patient reports complete understanding of discussions and recommendations and wish to proceed with the procedure.          _________________________________  An Edgar Sanchez MD, [facility], [facility]  Cardiac Electrophysiology  [facility] [place]          Cardiac Electrophysiology OFFICE Follow-up Note       Assessment/Plan:   1. Persistent atrial fibrillation (HCC)  -     EKG 12 Lead  2. Typical atrial flutter (HCC)  -     EKG 12 Lead  3. HTN (hypertension), benign  -     EKG 12 Lead  4. Chronic diastolic CHF (congestive heart failure), NYHA class 2 (HCC)  -     EKG 12 Lead  5. Type 2 diabetes mellitus with hyperglycemia, with long-term current use of insulin (HCC)  -     EKG 12 Lead  6. Anticoagulation adequate  -     EKG 12 Lead  7. Morbid obesity  -     EKG 12 Lead           Primary Cardiologist: Jorge        Sinus pauses/SSS  - One week monitor March 2025 showed 53 pauses noted, longest lasting 7.06 seconds on 4/3/25 at 2:15 AM. 5.77 seconds pause noted on 3/29/25 at 7:30 AM, 3.8 seconds pause on 3/29/25 at 12:45 PM consistent with sinus pause, 5.9 seconds pause on 4/3/25 at 5:31 AM.  - Recommend dual chamber pacemaker   - I have discussed the risks and benefits of pacemaker implant with the patient including but not limited to MI, death, bleeding, infection, lead dislodgement, pneumothorax, tamponade. Patient reports complete understanding of discussions and recommendations and wish to proceed with the procedure.  - no longer on Metoprolol  - emphasize CPAP compliance  - hold Eliquis 2 day sprior  - schedule for dual chamber PPM next available     Persistent atrial fibrillation/typical atrial flutter  -Progressive worsening persistent

## 2025-05-12 NOTE — PROCEDURES
Pacemaker Implantation    Procedure Date: 05/12/25  Lab Physician: Brandee Sanchez MD, Merged with [facility], [facility]    INDICATIONS:  63 yo gentleman with a history of HFpEF, HTN, DM, morbid obesity, SSS with multiple recurrent pauses >5 seconds (longest lasting >7 seconds), history of PAF/AFL s/p PVI/CTI ablation (5/24/24) TOMMY on CPAP now referred for dual chamber PPM implantation.    COMMENTS:  After informed consent was obtained, the patient was brought to the electrophysiology laboratory in the fasting state, and was prepped and draped in the usual sterile fashion. IV antibiotic was administered prophylactically. Conscious sedation was administered by nursing staff independent of those performing the procedure under my supervision with intermittent dosing of anxiolytics and narcotics for a total sedation time of 39 mins.    Ultrasound-guided Access  Local anesthetic was delivered to the left pectoral region and an incision was made in the left deltopectoral groove. The axillary vein was accessed using a micropuncture needle with ultrasound guidance (ultrasound evaluation of possible access sites. Patency of the selected vessel.  Realtime visualization of the vascular needle entry was performed) and the vein was cannulated and a retaining wire was placed in the IVC under fluoroscopy. A 7F peel-away sheath was placed in the vein and a Medtronic lead was advanced to the RV apex under fluoroscopic guidance. Ventricular sensing and pacing thresholds were checked and were good.    A 7F peel away sheath was then placed in the vein along with the ventricular lead over the retained glide wire, and a Medtronic lead was placed in the RAA under fluoroscopic guidance. Atrial pacing and sensing thresholds were checked and were good. Pacing at 10V was performed from the ventricular lead without diaphragmatic or intercostal capture. The leads were sutured to the underlying pectoralis muscle using 0 Silk suture. Final pacing and sensing thresholds

## 2025-05-12 NOTE — PROGRESS NOTES
7:26 AM  Patient arrived. ID and allergies verified verbally with patient. Pt voices understanding of procedure to be performed. Consent obtained. Pt prepped for procedure.    1100  Patient ambulates in hallway or on unit.    Site is clean dry and intact post ambulation.  Ice pack and sling applied.  Tylenol given.  Discharge instructions reviewed with patient and patients wife.    Pt discharged via wheelchair with wife. Personal belongings with patient upon discharge.

## 2025-05-15 ENCOUNTER — OFFICE VISIT (OUTPATIENT)
Age: 65
End: 2025-05-15
Payer: OTHER GOVERNMENT

## 2025-05-15 VITALS
WEIGHT: 315 LBS | OXYGEN SATURATION: 96 % | HEART RATE: 71 BPM | DIASTOLIC BLOOD PRESSURE: 80 MMHG | SYSTOLIC BLOOD PRESSURE: 140 MMHG | BODY MASS INDEX: 41.75 KG/M2 | HEIGHT: 73 IN

## 2025-05-15 DIAGNOSIS — G47.31 CENTRAL SLEEP APNEA: ICD-10-CM

## 2025-05-15 DIAGNOSIS — E66.01 MORBID OBESITY (HCC): ICD-10-CM

## 2025-05-15 DIAGNOSIS — E11.65 TYPE 2 DIABETES MELLITUS WITH HYPERGLYCEMIA, WITH LONG-TERM CURRENT USE OF INSULIN (HCC): ICD-10-CM

## 2025-05-15 DIAGNOSIS — I48.0 PAROXYSMAL ATRIAL FIBRILLATION (HCC): ICD-10-CM

## 2025-05-15 DIAGNOSIS — I10 HTN (HYPERTENSION), BENIGN: Primary | ICD-10-CM

## 2025-05-15 DIAGNOSIS — I50.32 CHRONIC HEART FAILURE WITH PRESERVED EJECTION FRACTION (HCC): ICD-10-CM

## 2025-05-15 DIAGNOSIS — Z79.01 ANTICOAGULATION ADEQUATE: ICD-10-CM

## 2025-05-15 DIAGNOSIS — Z79.4 TYPE 2 DIABETES MELLITUS WITH HYPERGLYCEMIA, WITH LONG-TERM CURRENT USE OF INSULIN (HCC): ICD-10-CM

## 2025-05-15 DIAGNOSIS — Z95.0 PACEMAKER: ICD-10-CM

## 2025-05-15 DIAGNOSIS — I50.32 CHRONIC DIASTOLIC CHF (CONGESTIVE HEART FAILURE), NYHA CLASS 2 (HCC): ICD-10-CM

## 2025-05-15 PROCEDURE — 3077F SYST BP >= 140 MM HG: CPT | Performed by: INTERNAL MEDICINE

## 2025-05-15 PROCEDURE — 99214 OFFICE O/P EST MOD 30 MIN: CPT | Performed by: INTERNAL MEDICINE

## 2025-05-15 PROCEDURE — 3079F DIAST BP 80-89 MM HG: CPT | Performed by: INTERNAL MEDICINE

## 2025-05-15 ASSESSMENT — PATIENT HEALTH QUESTIONNAIRE - PHQ9
1. LITTLE INTEREST OR PLEASURE IN DOING THINGS: NOT AT ALL
SUM OF ALL RESPONSES TO PHQ QUESTIONS 1-9: 0
2. FEELING DOWN, DEPRESSED OR HOPELESS: NOT AT ALL
SUM OF ALL RESPONSES TO PHQ QUESTIONS 1-9: 0

## 2025-05-15 NOTE — PROGRESS NOTES
Patient: Stefan Calabrese Sr.  : 1960    Primary Cardiologist: Ramila Patel MD  EP Cardiologist:  Lali Sanchez   PCP: Romeo Sommer MD    Today's Date: 5/15/2025    Echo shows normal LVEF, severe LVH.  AFIB.  Adjusted BP meds, discussed TOMMY management and weight loss.  AFIb sp ablation.  Now sp PPM 25.    ASSESSMENT AND PLAN:     Assessment and Plan:  AFIB  Admission 2023 [facility]  AFIb with RVR  A/C HFrEF 45-50%, also component of HFpEF, LVH  Sp CASEY+ DCCV  Recurrent  -S/p PVI, GP, Ligament of Ranjit, & CTI ablation (2024-Daniel)  Eliquis toprol  -Stopped amiodarone 1 month post ablation -OFF  -Obtain 7 day holter 1 month prior to following up with Dr. Sanchez.  3.6 pause on monitor, sleeping hours in setting of TOMMY  Repeat holter 3/2025 53 pauses noted, longest lasting 7.06 seconds on 4/3/25 at 2:15 AM. 5.77 seconds pause noted on 3/29/25 at 7:30 AM, 3.8 seconds pause on 3/29/25 at 12:45 PM consistent with sinus pause, 5.9 seconds pause on 4/3/25 at 5:31 AM.   Sp Medtronic dcPPM 25     2. HTN  Entresto step 3  Nifedipine 50  Toprol XL 50 - stopped in setting of PPM, can restart now    3. TOMMY  Central severe TOMMY - not a candidate of INSPIRE, Plans for Phrenic Nerve Stimulation (REMEDE) - not pursuing at this time.   Dr. Leggett.    New machine, compliant.  CPAP, working out ok.     4.  Gout  Stable   Off colchine  Continue allopurinol 150mg po QD    5.  Weight loss    6.  Sp Medtronic dc PPM 25 Dr. Sanchez      Follow up 6 months.      ICD-10-CM    1. HTN (hypertension), benign  I10       2. Chronic diastolic CHF (congestive heart failure), NYHA class 2 (HCC)  I50.32       3. Type 2 diabetes mellitus with hyperglycemia, with long-term current use of insulin (HCC)  E11.65     Z79.4       4. Morbid obesity (HCC)  E66.01       5. Anticoagulation adequate  Z79.01       6. Central sleep apnea  G47.31       7. Chronic heart failure with preserved ejection fraction (HCC)  I50.32       8.

## 2025-05-27 ENCOUNTER — PROCEDURE VISIT (OUTPATIENT)
Age: 65
End: 2025-05-27

## 2025-05-27 DIAGNOSIS — Z95.0 CARDIAC PACEMAKER IN SITU: Primary | ICD-10-CM

## 2025-05-27 NOTE — PROGRESS NOTES
Patient presents for wound check post-device implantation. The dressing was removed and the site was inspected. The site appeared to be well-healing without ecchymosis/tenderness/erythema. Denies pain, fevers, discharge. Patient has some redness around site due to adhesive on bandage. Reviewed and given handout on Restrictions he should continue for 2 more weeks.     Restrictions:    NO raising affected arm above shoulder height until: 1 month (or 4 weeks) post implant   NO lifting (with affected arm) heavier than 10 pounds until:  1 month (or 4 weeks) post implant, slowly work back into regular activity after   NO fast, swinging motions (raking, golfing/swimming/power walking) until:  6 weeks post implant, slowly work back into regular activity after   NO arc welding or chainsaw use: ICD: NOT allowed  Pacemaker: 1 month (or 4 weeks) post implant. Refer to patient number on device card to reference certain equipment.   NO soaking in water (bathtub, hot tub, pool, river, ocean, etc.): 1 month (or 4 weeks) post implant or until completely healed   Allowed to shower: Ok to get bandage wet, OK to shower after bandage removed. Do not let shower beat on incision site. Pat dry.   NO dental work for 8 weeks after your implant. (antibiotics only needed with certain procedures)   MRI compatible devices AND leads:  Must wait until 6 weeks after implant.    DRIVING: No driving for 3 days, or longer depending on MD's recommendations. You must wear seatbelt per [place] law. If this is uncomfortable, use a pad or towel over the site or avoid driving until discomfort lessens.

## 2025-07-07 ASSESSMENT — SLEEP AND FATIGUE QUESTIONNAIRES
HOW LIKELY ARE YOU TO NOD OFF OR FALL ASLEEP WHILE SITTING AND READING: SLIGHT CHANCE OF DOZING
HOW LIKELY ARE YOU TO NOD OFF OR FALL ASLEEP WHILE SITTING QUIETLY AFTER LUNCH WITHOUT ALCOHOL: WOULD NEVER DOZE
ESS TOTAL SCORE: 3
HOW LIKELY ARE YOU TO NOD OFF OR FALL ASLEEP IN A CAR, WHILE STOPPED FOR A FEW MINUTES IN TRAFFIC: WOULD NEVER DOZE
DO YOU HAVE DIFFICULTY OPERATING A MOTOR VEHICLE FOR LONG DISTANCES (GREATER THAN 100 MILES) BECAUSE YOU BECOME SLEEPY: NO
HOW LIKELY ARE YOU TO NOD OFF OR FALL ASLEEP WHILE WATCHING TV: SLIGHT CHANCE OF DOZING
DO YOU HAVE DIFFICULTY WATCHING A MOVIE OR VIDEO BECAUSE YOU BECOME SLEEPY OR TIRED: NO
HOW LIKELY ARE YOU TO NOD OFF OR FALL ASLEEP WHEN YOU ARE A PASSENGER IN A CAR FOR AN HOUR WITHOUT A BREAK: WOULD NEVER DOZE
HOW LIKELY ARE YOU TO NOD OFF OR FALL ASLEEP WHILE SITTING AND TALKING TO SOMEONE: WOULD NEVER DOZE
DO YOU GENERALLY HAVE DIFFICULTY REMEMBERING THINGS BECAUSE YOU ARE SLEEPY OR TIRED: NO
HOW LIKELY ARE YOU TO NOD OFF OR FALL ASLEEP WHEN YOU ARE A PASSENGER IN A CAR FOR AN HOUR WITHOUT A BREAK: WOULD NEVER DOZE
HOW LIKELY ARE YOU TO NOD OFF OR FALL ASLEEP WHILE SITTING INACTIVE IN A PUBLIC PLACE: WOULD NEVER DOZE
HAS YOUR RELATIONSHIP WITH FAMILY, FRIENDS OR WORK COLLEAGUES BEEN AFFECTED BECAUSE YOU ARE SLEEPY OR TIRED: NO
DO YOU HAVE DIFFICULTY CONCENTRATING ON THE THINGS YOU DO BECAUSE YOU ARE SLEEPY OR TIRED: NO
DO YOU HAVE DIFFICULTY BEING AS ACTIVE AS YOU WANT TO BE IN THE MORNING BECAUSE YOU ARE SLEEPY OR TIRED: NO
HOW LIKELY ARE YOU TO NOD OFF OR FALL ASLEEP WHILE SITTING AND READING: SLIGHT CHANCE OF DOZING
HOW LIKELY ARE YOU TO NOD OFF OR FALL ASLEEP WHILE SITTING QUIETLY AFTER LUNCH WITHOUT ALCOHOL: WOULD NEVER DOZE
HOW LIKELY ARE YOU TO NOD OFF OR FALL ASLEEP WHILE SITTING AND TALKING TO SOMEONE: WOULD NEVER DOZE
DO YOU HAVE DIFFICULTY VISITING YOUR FAMILY OR FRIENDS IN THEIR HOME BECAUSE YOU BECOME SLEEPY OR TIRED: NO
FOSQ SCORE: 20
HOW LIKELY ARE YOU TO NOD OFF OR FALL ASLEEP WHILE SITTING INACTIVE IN A PUBLIC PLACE: WOULD NEVER DOZE
DO YOU HAVE DIFFICULTY BEING AS ACTIVE AS YOU WANT TO BE IN THE EVENING BECAUSE YOU ARE SLEEPY OR TIRED: NO
HOW LIKELY ARE YOU TO NOD OFF OR FALL ASLEEP IN A CAR, WHILE STOPPED FOR A FEW MINUTES IN TRAFFIC: WOULD NEVER DOZE
HAS YOUR MOOD BEEN AFFECTED BECAUSE YOU ARE SLEEPY OR TIRED: NO
HOW LIKELY ARE YOU TO NOD OFF OR FALL ASLEEP WHILE WATCHING TV: SLIGHT CHANCE OF DOZING
HOW LIKELY ARE YOU TO NOD OFF OR FALL ASLEEP WHILE LYING DOWN TO REST IN THE AFTERNOON WHEN CIRCUMSTANCES PERMIT: SLIGHT CHANCE OF DOZING
HOW LIKELY ARE YOU TO NOD OFF OR FALL ASLEEP WHILE LYING DOWN TO REST IN THE AFTERNOON WHEN CIRCUMSTANCES PERMIT: SLIGHT CHANCE OF DOZING
DO YOU HAVE DIFFICULTY OPERATING A MOTOR VEHICLE FOR SHORT DISTANCES (LESS THAN 100 MILES) BECAUSE YOU BECOME SLEEPY: NO

## 2025-07-08 ENCOUNTER — OFFICE VISIT (OUTPATIENT)
Age: 65
End: 2025-07-08
Payer: MEDICARE

## 2025-07-08 VITALS
SYSTOLIC BLOOD PRESSURE: 123 MMHG | HEART RATE: 73 BPM | BODY MASS INDEX: 41.75 KG/M2 | DIASTOLIC BLOOD PRESSURE: 83 MMHG | HEIGHT: 73 IN | OXYGEN SATURATION: 95 % | WEIGHT: 315 LBS | TEMPERATURE: 97.6 F

## 2025-07-08 DIAGNOSIS — G47.31 CENTRAL SLEEP APNEA: Primary | ICD-10-CM

## 2025-07-08 DIAGNOSIS — I10 HTN (HYPERTENSION), BENIGN: ICD-10-CM

## 2025-07-08 DIAGNOSIS — Z86.79 H/O HEART FAILURE: ICD-10-CM

## 2025-07-08 PROCEDURE — G8427 DOCREV CUR MEDS BY ELIG CLIN: HCPCS | Performed by: INTERNAL MEDICINE

## 2025-07-08 PROCEDURE — 3074F SYST BP LT 130 MM HG: CPT | Performed by: INTERNAL MEDICINE

## 2025-07-08 PROCEDURE — 3017F COLORECTAL CA SCREEN DOC REV: CPT | Performed by: INTERNAL MEDICINE

## 2025-07-08 PROCEDURE — 1123F ACP DISCUSS/DSCN MKR DOCD: CPT | Performed by: INTERNAL MEDICINE

## 2025-07-08 PROCEDURE — G8417 CALC BMI ABV UP PARAM F/U: HCPCS | Performed by: INTERNAL MEDICINE

## 2025-07-08 PROCEDURE — 3079F DIAST BP 80-89 MM HG: CPT | Performed by: INTERNAL MEDICINE

## 2025-07-08 PROCEDURE — 99213 OFFICE O/P EST LOW 20 MIN: CPT | Performed by: INTERNAL MEDICINE

## 2025-07-08 PROCEDURE — 4004F PT TOBACCO SCREEN RCVD TLK: CPT | Performed by: INTERNAL MEDICINE

## 2025-07-08 NOTE — PATIENT INSTRUCTIONS
[address]  [place] [address]  Tel.  [phone]  Fax. [phone] [address]  [place] [address]  Tel.  [phone]  Fax. [phone] [address] [place] [address]  Tel.  [phone]  Fax. [phone]     Learning About CPAP for Sleep Apnea  What is CPAP?              CPAP is a small machine that you use at home every night while you sleep. It increases air pressure in your throat to keep your airway open. When you have sleep apnea, this can help you sleep better so you feel much better. CPAP stands for \"continuous positive airway pressure.\"  The CPAP machine will have one of the following:  A mask that covers your nose and mouth  Prongs that fit into your nose  A mask that covers your nose only, the most common type. This type is called NCPAP. The N stands for \"nasal.\"  Why is it done?  CPAP is usually the best treatment for obstructive sleep apnea. It is the first treatment choice and the most widely used. Your doctor may suggest CPAP if you have:  Moderate to severe sleep apnea.  Sleep apnea and coronary artery disease (CAD) or heart failure.  How does it help?  CPAP can help you have more normal sleep, so you feel less sleepy and more alert during the daytime.  CPAP may help keep heart failure or other heart problems from getting worse.  NCPAP may help lower your blood pressure.  If you use CPAP, your bed partner may also sleep better because you are not snoring or restless.  What are the side effects?  Some people who use CPAP have:  A dry or stuffy nose and a sore throat.  Irritated skin on the face.  Sore eyes.  Bloating.  If you have any of these problems, work with your doctor to fix them. Here are some things you can try:  Be sure the mask or nasal prongs fit well.  See if your doctor can adjust the pressure of your CPAP.  If your nose is dry, try a humidifier.  If your nose is runny or stuffy, try decongestant medicine or a steroid

## 2025-07-09 ENCOUNTER — PATIENT MESSAGE (OUTPATIENT)
Age: 65
End: 2025-07-09

## 2025-07-14 ENCOUNTER — CLINICAL DOCUMENTATION (OUTPATIENT)
Age: 65
End: 2025-07-14

## 2025-07-17 ENCOUNTER — CLINICAL DOCUMENTATION (OUTPATIENT)
Age: 65
End: 2025-07-17

## 2025-07-17 NOTE — PROGRESS NOTES
Dental clearance form from [facility] completed by Dr. Patel.   cleared  No ABT prophylaxis   May stop eliquis/oac 48 hrs prior and restart ASAP post procedure     Patient has copy of form.

## 2025-08-18 ENCOUNTER — PROCEDURE VISIT (OUTPATIENT)
Age: 65
End: 2025-08-18
Payer: MEDICARE

## 2025-08-18 ENCOUNTER — OFFICE VISIT (OUTPATIENT)
Age: 65
End: 2025-08-18
Payer: MEDICARE

## 2025-08-18 VITALS
OXYGEN SATURATION: 95 % | WEIGHT: 315 LBS | BODY MASS INDEX: 42.66 KG/M2 | HEART RATE: 78 BPM | HEIGHT: 72 IN | SYSTOLIC BLOOD PRESSURE: 132 MMHG | DIASTOLIC BLOOD PRESSURE: 80 MMHG

## 2025-08-18 DIAGNOSIS — Z95.0 PACEMAKER: Primary | ICD-10-CM

## 2025-08-18 DIAGNOSIS — G47.33 OSA ON CPAP: ICD-10-CM

## 2025-08-18 DIAGNOSIS — Z86.79 S/P ABLATION OF ATRIAL FIBRILLATION: ICD-10-CM

## 2025-08-18 DIAGNOSIS — Z86.79 S/P ABLATION OF ATRIAL FLUTTER: ICD-10-CM

## 2025-08-18 DIAGNOSIS — I48.3 TYPICAL ATRIAL FLUTTER (HCC): ICD-10-CM

## 2025-08-18 DIAGNOSIS — Z95.0 CARDIAC PACEMAKER IN SITU: Primary | ICD-10-CM

## 2025-08-18 DIAGNOSIS — I48.19 PERSISTENT ATRIAL FIBRILLATION (HCC): ICD-10-CM

## 2025-08-18 DIAGNOSIS — Z98.890 S/P ABLATION OF ATRIAL FLUTTER: ICD-10-CM

## 2025-08-18 DIAGNOSIS — I50.32 CHRONIC HEART FAILURE WITH PRESERVED EJECTION FRACTION (HCC): ICD-10-CM

## 2025-08-18 DIAGNOSIS — E66.01 MORBID OBESITY (HCC): ICD-10-CM

## 2025-08-18 DIAGNOSIS — Z98.890 S/P ABLATION OF ATRIAL FIBRILLATION: ICD-10-CM

## 2025-08-18 DIAGNOSIS — I49.5 SSS (SICK SINUS SYNDROME) (HCC): ICD-10-CM

## 2025-08-18 DIAGNOSIS — Z79.01 ANTICOAGULATED: ICD-10-CM

## 2025-08-18 PROCEDURE — 1123F ACP DISCUSS/DSCN MKR DOCD: CPT | Performed by: NURSE PRACTITIONER

## 2025-08-18 PROCEDURE — 4004F PT TOBACCO SCREEN RCVD TLK: CPT | Performed by: NURSE PRACTITIONER

## 2025-08-18 PROCEDURE — 3075F SYST BP GE 130 - 139MM HG: CPT | Performed by: NURSE PRACTITIONER

## 2025-08-18 PROCEDURE — 99214 OFFICE O/P EST MOD 30 MIN: CPT | Performed by: NURSE PRACTITIONER

## 2025-08-18 PROCEDURE — G8417 CALC BMI ABV UP PARAM F/U: HCPCS | Performed by: NURSE PRACTITIONER

## 2025-08-18 PROCEDURE — 93280 PM DEVICE PROGR EVAL DUAL: CPT | Performed by: INTERNAL MEDICINE

## 2025-08-18 PROCEDURE — G8427 DOCREV CUR MEDS BY ELIG CLIN: HCPCS | Performed by: NURSE PRACTITIONER

## 2025-08-18 PROCEDURE — 3017F COLORECTAL CA SCREEN DOC REV: CPT | Performed by: NURSE PRACTITIONER

## 2025-08-18 PROCEDURE — 3079F DIAST BP 80-89 MM HG: CPT | Performed by: NURSE PRACTITIONER

## 2025-08-27 DIAGNOSIS — I10 HTN (HYPERTENSION), BENIGN: ICD-10-CM

## 2025-08-28 RX ORDER — SACUBITRIL AND VALSARTAN 97; 103 MG/1; MG/1
1 TABLET ORAL 2 TIMES DAILY
Qty: 180 TABLET | Refills: 3 | Status: SHIPPED | OUTPATIENT
Start: 2025-08-28

## 2025-08-28 RX ORDER — SPIRONOLACTONE 25 MG/1
12.5 TABLET ORAL DAILY
Qty: 45 TABLET | Refills: 3 | Status: SHIPPED | OUTPATIENT
Start: 2025-08-28

## 2025-08-28 RX ORDER — NIFEDIPINE 60 MG/1
60 TABLET, EXTENDED RELEASE ORAL DAILY
Qty: 90 TABLET | Refills: 3 | Status: SHIPPED | OUTPATIENT
Start: 2025-08-28

## (undated) DEVICE — MEDI-TRACE CADENCE ADULT, DEFIBRILLATION ELECTRODE -RTS  (10 PR/PK) - PHYSIO-CONTROL: Brand: MEDI-TRACE CADENCE

## (undated) DEVICE — SYRINGE IRRIG 60ML SFT PLIABLE BLB EZ TO GRP 1 HND USE W/

## (undated) DEVICE — INTRODUCER SHTH L13CM OD7FR SH ORNG HUB SEAMLESS SAFSHTH

## (undated) DEVICE — Device: Brand: NRG TRANSSEPTAL NEEDLE

## (undated) DEVICE — STRIP,CLOSURE,WOUND,MEDI-STRIP,1/2X4: Brand: MEDLINE

## (undated) DEVICE — WASTE KIT - ST MARY: Brand: MEDLINE INDUSTRIES, INC.

## (undated) DEVICE — COVER CATH ACUNAV ULTRASOUND 5X72IN ANTI STATIC

## (undated) DEVICE — ELECTRODE PT RET AD L9FT HI MOIST COND ADH HYDRGEL CORDED

## (undated) DEVICE — DISP SPLIT ADULT RETURN ELECTRODE W/3M CABLE 50/BX: Brand: BOVIE

## (undated) DEVICE — CATHETER EP 7FR L115CM 2-8-2MM SPC TIP 2MM 10 ELECTRD F L

## (undated) DEVICE — CATHETER US 8FR L90CM GRN TIP OVERLAY FOR GE-VIVID I VIVID

## (undated) DEVICE — PAD GROUNDING ADLT ADH FOIL 9FT CORD UNIV

## (undated) DEVICE — SUTURE MNFLMNT SH 26 MM 1/2 CI CRCLE TPR PNT SYMTRC PD PLU

## (undated) DEVICE — CABLE CATH L10FT RED PIN CONN 34-34 FOR THERMOCOOL

## (undated) DEVICE — SUTURE MONOCRYL + ABSORBABLE MONOFILAMENT 2-0 CT1 12 IN UD SXMP1B412

## (undated) DEVICE — DEVICE THERM REG SIL ESOPH MLTI LUMEN COLDER PLC CONN 5 DEG

## (undated) DEVICE — ELECTRODE,RADIOTRANSLUCENT,FOAM,5PK: Brand: MEDLINE

## (undated) DEVICE — SHEATH INTRO 8.5FR L71CM 8.5FR DIL GWIRE L180CM DIA0.032IN

## (undated) DEVICE — SUTURE PERMA-HAND SZ 0 L30IN NONABSORBABLE BLK L36MM CT-1 424H

## (undated) DEVICE — 3M™ IOBAN™ 2 ANTIMICROBIAL INCISE DRAPE 6640EZ: Brand: IOBAN™ 2

## (undated) DEVICE — PRESSURE MONITORING SET: Brand: TRUWAVE

## (undated) DEVICE — TUBING PMP FOR CARTO SYS SMARTABLATE

## (undated) DEVICE — CATHETER ABLAT 8FR L115CM 1-6-2MM SPC TIP 3.5MM DF CRV

## (undated) DEVICE — Device

## (undated) DEVICE — ASTOUND STANDARD SURGICAL GOWN, XXL: Brand: CONVERTORS

## (undated) DEVICE — CABLE CATH L2.7M CONNECTS TO CARTO 3 SYS PIU FOR LASSO ECO

## (undated) DEVICE — PERCLOSE PROGLIDE™ SUTURE-MEDIATED CLOSURE SYSTEM: Brand: PERCLOSE PROGLIDE™

## (undated) DEVICE — DRESSING ANTIMIC FOAM OPTIFOAM POSTOP ADH 4 X 6 IN

## (undated) DEVICE — PINNACLE INTRODUCER SHEATH: Brand: PINNACLE

## (undated) DEVICE — COVER US PRB W15XL120CM W/ GEL RUBBERBAND TAPE STRP FLD GEN

## (undated) DEVICE — Device: Brand: RFP-100A CONNECTOR CABLE

## (undated) DEVICE — SUTURE MONOCRYL STRATAFIX SPRL + SZ 4-0 L12IN ABSRB UD PS-2 SXMP1B117

## (undated) DEVICE — PATCH REF EXT FOR CARTO 3 SYS (EA = 6 PACKS)

## (undated) DEVICE — INTRODUCER SHTH 11FR CANN L23CM DIL TIP 45MM YEL W/O MINI

## (undated) DEVICE — CATHETER MAP D CRV 3-3-3-3-3 MM SPC GALAXY OCTARAY

## (undated) DEVICE — PROVE COVER: Brand: UNBRANDED

## (undated) DEVICE — HEART CATH-MRMC: Brand: MEDLINE INDUSTRIES, INC.